# Patient Record
Sex: FEMALE | Race: WHITE | NOT HISPANIC OR LATINO | URBAN - METROPOLITAN AREA
[De-identification: names, ages, dates, MRNs, and addresses within clinical notes are randomized per-mention and may not be internally consistent; named-entity substitution may affect disease eponyms.]

---

## 2019-12-05 ENCOUNTER — EMERGENCY (EMERGENCY)
Facility: HOSPITAL | Age: 84
LOS: 1 days | Discharge: HOME | End: 2019-12-05
Attending: EMERGENCY MEDICINE | Admitting: EMERGENCY MEDICINE
Payer: MEDICARE

## 2019-12-05 ENCOUNTER — TRANSCRIPTION ENCOUNTER (OUTPATIENT)
Age: 84
End: 2019-12-05

## 2019-12-05 VITALS
DIASTOLIC BLOOD PRESSURE: 93 MMHG | OXYGEN SATURATION: 99 % | SYSTOLIC BLOOD PRESSURE: 182 MMHG | HEART RATE: 86 BPM | RESPIRATION RATE: 18 BRPM | TEMPERATURE: 97 F

## 2019-12-05 DIAGNOSIS — Q74.2 OTHER CONGENITAL MALFORMATIONS OF LOWER LIMB(S), INCLUDING PELVIC GIRDLE: Chronic | ICD-10-CM

## 2019-12-05 LAB
ALBUMIN SERPL ELPH-MCNC: 3.7 G/DL — SIGNIFICANT CHANGE UP (ref 3.3–5)
ALP SERPL-CCNC: 105 U/L — SIGNIFICANT CHANGE UP (ref 40–120)
ALT FLD-CCNC: 13 U/L — SIGNIFICANT CHANGE UP (ref 4–33)
ANION GAP SERPL CALC-SCNC: 13 MMO/L — SIGNIFICANT CHANGE UP (ref 7–14)
APPEARANCE UR: CLEAR — SIGNIFICANT CHANGE UP
AST SERPL-CCNC: 17 U/L — SIGNIFICANT CHANGE UP (ref 4–32)
BASE EXCESS BLDV CALC-SCNC: 2.9 MMOL/L — SIGNIFICANT CHANGE UP
BILIRUB SERPL-MCNC: 0.5 MG/DL — SIGNIFICANT CHANGE UP (ref 0.2–1.2)
BILIRUB UR-MCNC: NEGATIVE — SIGNIFICANT CHANGE UP
BLOOD UR QL VISUAL: NEGATIVE — SIGNIFICANT CHANGE UP
BUN SERPL-MCNC: 22 MG/DL — SIGNIFICANT CHANGE UP (ref 7–23)
CALCIUM SERPL-MCNC: 9.5 MG/DL — SIGNIFICANT CHANGE UP (ref 8.4–10.5)
CHLORIDE SERPL-SCNC: 105 MMOL/L — SIGNIFICANT CHANGE UP (ref 98–107)
CO2 SERPL-SCNC: 24 MMOL/L — SIGNIFICANT CHANGE UP (ref 22–31)
COLOR SPEC: YELLOW — SIGNIFICANT CHANGE UP
CREAT SERPL-MCNC: 1.11 MG/DL — SIGNIFICANT CHANGE UP (ref 0.5–1.3)
GAS PNL BLDV: 139 MMOL/L — SIGNIFICANT CHANGE UP (ref 136–146)
GLUCOSE BLDV-MCNC: 117 MG/DL — HIGH (ref 70–99)
GLUCOSE SERPL-MCNC: 112 MG/DL — HIGH (ref 70–99)
GLUCOSE UR-MCNC: NEGATIVE — SIGNIFICANT CHANGE UP
HCO3 BLDV-SCNC: 25 MMOL/L — SIGNIFICANT CHANGE UP (ref 20–27)
HCT VFR BLD CALC: 35.1 % — SIGNIFICANT CHANGE UP (ref 34.5–45)
HCT VFR BLDV CALC: 40 % — SIGNIFICANT CHANGE UP (ref 34.5–45)
HGB BLD-MCNC: 11.5 G/DL — SIGNIFICANT CHANGE UP (ref 11.5–15.5)
HGB BLDV-MCNC: 13 G/DL — SIGNIFICANT CHANGE UP (ref 11.5–15.5)
KETONES UR-MCNC: NEGATIVE — SIGNIFICANT CHANGE UP
LEUKOCYTE ESTERASE UR-ACNC: NEGATIVE — SIGNIFICANT CHANGE UP
MCHC RBC-ENTMCNC: 30.3 PG — SIGNIFICANT CHANGE UP (ref 27–34)
MCHC RBC-ENTMCNC: 32.8 % — SIGNIFICANT CHANGE UP (ref 32–36)
MCV RBC AUTO: 92.6 FL — SIGNIFICANT CHANGE UP (ref 80–100)
NITRITE UR-MCNC: NEGATIVE — SIGNIFICANT CHANGE UP
NRBC # FLD: 0 K/UL — SIGNIFICANT CHANGE UP (ref 0–0)
PCO2 BLDV: 54 MMHG — HIGH (ref 41–51)
PH BLDV: 7.34 PH — SIGNIFICANT CHANGE UP (ref 7.32–7.43)
PH UR: 6 — SIGNIFICANT CHANGE UP (ref 5–8)
PLATELET # BLD AUTO: 171 K/UL — SIGNIFICANT CHANGE UP (ref 150–400)
PMV BLD: 9.3 FL — SIGNIFICANT CHANGE UP (ref 7–13)
PO2 BLDV: < 24 MMHG — LOW (ref 35–40)
POTASSIUM BLDV-SCNC: 4 MMOL/L — SIGNIFICANT CHANGE UP (ref 3.4–4.5)
POTASSIUM SERPL-MCNC: 4.1 MMOL/L — SIGNIFICANT CHANGE UP (ref 3.5–5.3)
POTASSIUM SERPL-SCNC: 4.1 MMOL/L — SIGNIFICANT CHANGE UP (ref 3.5–5.3)
PROT SERPL-MCNC: 6.8 G/DL — SIGNIFICANT CHANGE UP (ref 6–8.3)
PROT UR-MCNC: 10 — SIGNIFICANT CHANGE UP
RBC # BLD: 3.79 M/UL — LOW (ref 3.8–5.2)
RBC # FLD: 13.4 % — SIGNIFICANT CHANGE UP (ref 10.3–14.5)
SAO2 % BLDV: 21.8 % — LOW (ref 60–85)
SODIUM SERPL-SCNC: 142 MMOL/L — SIGNIFICANT CHANGE UP (ref 135–145)
SP GR SPEC: 1.02 — SIGNIFICANT CHANGE UP (ref 1–1.04)
UROBILINOGEN FLD QL: SIGNIFICANT CHANGE UP
WBC # BLD: 5.71 K/UL — SIGNIFICANT CHANGE UP (ref 3.8–10.5)
WBC # FLD AUTO: 5.71 K/UL — SIGNIFICANT CHANGE UP (ref 3.8–10.5)

## 2019-12-05 PROCEDURE — 99284 EMERGENCY DEPT VISIT MOD MDM: CPT | Mod: GC

## 2019-12-05 PROCEDURE — 74176 CT ABD & PELVIS W/O CONTRAST: CPT | Mod: 26

## 2019-12-05 RX ORDER — LIDOCAINE 4 G/100G
1 CREAM TOPICAL ONCE
Refills: 0 | Status: COMPLETED | OUTPATIENT
Start: 2019-12-05 | End: 2019-12-05

## 2019-12-05 RX ORDER — ACETAMINOPHEN 500 MG
975 TABLET ORAL ONCE
Refills: 0 | Status: COMPLETED | OUTPATIENT
Start: 2019-12-05 | End: 2019-12-05

## 2019-12-05 RX ADMIN — Medication 975 MILLIGRAM(S): at 21:45

## 2019-12-05 NOTE — ED ADULT NURSE NOTE - INTERVENTIONS DEFINITIONS
Stretcher in lowest position, wheels locked, appropriate side rails in place/Monitor gait and stability/Non-slip footwear when patient is off stretcher/Physically safe environment: no spills, clutter or unnecessary equipment

## 2019-12-05 NOTE — ED ADULT TRIAGE NOTE - CHIEF COMPLAINT QUOTE
Patient has c/o right flank to lower quadrant pain that has gotten worse today. Pt states pain gets so bad she gets dizzy. Pt had upset stomach and back pain for 2 weeks ago and was prescribed Linzess with no relief. Pt's dtr also states that pt has been constipated. MD  feels that she might be a kidney stone.

## 2019-12-05 NOTE — ED ADULT NURSE NOTE - OBJECTIVE STATEMENT
pt received in rm 29 AAO x 3. pt reports right flank pain and decreased PO intake for the past 1-2 weeks. pt denies sob, chest pain, nausea, vomiting, dirrhea, fevers, chills, urinary symptoms. respirations even and unlabored. labs drawn and sent.

## 2019-12-05 NOTE — ED PROVIDER NOTE - PATIENT PORTAL LINK FT
You can access the FollowMyHealth Patient Portal offered by [facility] by registering at the following website: [email or web address] By joining [facility]’s FollowMyHealth portal, you will also be able to view your health information using other applications (apps) compatible with our system.

## 2019-12-05 NOTE — ED PROVIDER NOTE - NSFOLLOWUPINSTRUCTIONS_ED_ALL_ED_FT
1.  prescription from pharmacy.   2. Use lidoderm patches as needed, for no more than 12 hours at a time. Must allow 12 hours or more between uses.   3. Make appointment to see PCP within 1-2 days, or as soon as possible.   4. Return to Emergency Department if pain worsens, develop fevers, changes in bowel or urinary habits.     Back Injury Prevention  Back injuries can be very painful. They can also be difficult to heal. After having one back injury, you are more likely to have another one again. It is important to learn how to avoid injuring or re-injuring your back. The following tips can help you to prevent a back injury.  What actions can I take to prevent back injuries?  Nutrition changes     Talk with your health care provider about your overall diet, and especially about foods that strengthen your bones.  Ask your health care provider how much calcium and vitamin D you need each day. These nutrients help to prevent weakening of the bones (osteoporosis). Osteoporosis can cause broken (fractured) bones, which lead to back pain.Eat foods that are good sources of calcium. These include dairy products, green leafy vegetables, and products that have had calcium added to them (fortified).Eat foods that are good sources of vitamin D. These include milk and foods that are fortified with vitamin D.If needed, take supplements and vitamins as directed by your health care provider.Physical fitness    Physical fitness strengthens your bones and your muscles. It also increases your balance and strength.  Exercise for 30 minutes per day on most days of the week, or as directed by your health care provider. Make sure to:  Do aerobic exercises, such as walking, jogging, biking, or swimming.Do exercises that increase balance and strength, such as eva chi and yoga. These can decrease your risk of falling and injuring your back.Do stretching exercises to help with flexibility.Develop strong abdominal muscles. Your abdominal muscles provide a lot of the support that your back needs.Maintain a healthy weight. This helps to decrease your risk of a back injury.Good posture            Prevent back injuries by developing and maintaining a good posture. To do this successfully:  Sit up and stand up straight. Avoid leaning forward when you sit or hunching over when you stand.Choose chairs that have good low-back (lumbar) support.If you work at a desk, sit close to it so you do not need to lean over. Keep your chin tucked in. Keep your neck drawn back, and keep your elbows bent at a right angle.Sit high and close to the steering wheel when you drive. Add a lumbar support to your car seat, if needed.Avoid sitting or standing in one position for very long. Take breaks to get up, stretch, and walk around at least one time every hour. Take breaks every hour if you are driving for long periods of time.Sleep on your side with your knees slightly bent, or sleep on your back with a pillow under your knees.Lifting, twisting, and reaching    Back injuries are more likely to occur when carrying loads and twisting at the same time. When you bend and lift, or reach for items that are high up in shelves, use positions that put less stress on your back.  Heavy lifting  Avoid heavy lifting, especially the kind of heavy lifting that is repetitive. If you must do heavy lifting:  Stretch before lifting.Work slowly.Rest between lifts.Use a tool such as a cart or a coleen to move objects.Make several small trips instead of carrying one heavy load.Ask for help when you need it, especially when moving big or heavy objects.Follow these steps when lifting:   Stand with your feet shoulder-width apart.Get as close to the object as you can. Do not try to  a heavy object that is far from your body.Use handles or lifting straps if they are available.Bend at your knees. Squat down, but keep your heels off the floor.Keep your shoulders pulled back, your chin tucked in, and your back straight. Lift the object slowly while you tighten the muscles in your legs, abdomen, and buttocks. Keep the object as close to the center of your body as possible.Follow these steps when putting down a heavy load:  Stand with your feet shoulder-width apart.Lower the object slowly while you tighten the muscles in your legs, abdomen, and buttocks. Keep the object as close to the center of your body as possible. Keep your shoulders pulled back, your chin tucked in, and your back straight.Bend at your knees. Squat down, but keep your heels off the floor.Use handles or lifting straps if they are available.Twisting and reaching  Avoid lifting heavy objects above your waist.Do not twist at your waist while you are lifting or carrying a load. If you need to turn, move your feet.Do not bend over without bending at your knees.Avoid reaching over your head, across a table, or for an object on a high surface.Other changes    Avoid wet floors and icy ground. Keep sidewalks clear of ice to prevent falls.Do not sleep on a mattress that is too soft or too hard.Put heavier objects on shelves at waist level, and put lighter objects on lower or higher shelves.Find ways to decrease your stress, such as by exercising, getting a massage, or practicing relaxation techniques. Stress can build up in your muscles. Tense muscles are more vulnerable to injury.Talk with your health care provider if you feel anxious or depressed. These conditions can make back pain worse.Wear flat heel shoes with cushioned soles.Use both shoulder straps when carrying a backpack.Do not use any products that contain nicotine or tobacco, such as cigarettes and e-cigarettes. If you need help quitting, ask your health care provider.Summary  Back injuries can be very painful and difficult to heal.You can prevent injuring or re-injuring your back by making nutrition changes, working on being physically fit, developing a good posture, and lifting heavy objects in a safe way.Making other changes can also help to prevent back injuries. These include eating a healthy diet, exercising regularly and maintaining a healthy weight.This information is not intended to replace advice given to you by your health care provider. Make sure you discuss any questions you have with your health care provider.

## 2019-12-05 NOTE — ED PROVIDER NOTE - ATTENDING CONTRIBUTION TO CARE
agree with resident note    "89 yo female hx HTN and GERD presenting with 1-2 day hx right flank pain. Decreased PO intake. No nausea/vomiting. No diarrhea. No dysuria. No fevers/chills. Was seen in urgent care today, and told to come to ED to r/o kidney stones. Took advil at home with no relief. No hx abdominal surgery."  Denies fever, urinary symptoms.    PE: well appearing; VSS: CTAB/L; s1 s2 no m/r/g abd soft/NT/ND back: right flank pain; no rash ext: no edema    imp: flank pain will get CT to r/o colic/AAA; check UA and labs; and reassess

## 2019-12-05 NOTE — ED PROVIDER NOTE - CLINICAL SUMMARY MEDICAL DECISION MAKING FREE TEXT BOX
Carmelita PGY1: 89 yo female hx HTN and GERD presenting with 1-2 day hx right flank pain. Labs wnl. CT no acute findings. Pain controlled with tylenol and lidoderm patch. Clinically stable. Discharge home with return precautions. Will follow up with PCP 1-2 days.

## 2019-12-05 NOTE — ED PROVIDER NOTE - OBJECTIVE STATEMENT
89 yo female hx HTN and GERD presenting with 1-2 day hx right flank pain. Decreased PO intake. No nausea/vomiting. No diarrhea. No dysuria. No fevers/chills. Was seen in urgent care today, and told to come to ED to r/o kidney stones. Took advil at home with no relief. No hx abdominal surgery.

## 2019-12-06 VITALS
RESPIRATION RATE: 18 BRPM | OXYGEN SATURATION: 100 % | SYSTOLIC BLOOD PRESSURE: 174 MMHG | DIASTOLIC BLOOD PRESSURE: 76 MMHG | HEART RATE: 74 BPM | TEMPERATURE: 98 F

## 2019-12-06 RX ORDER — LIDOCAINE 4 G/100G
10 CREAM TOPICAL
Qty: 10 | Refills: 0
Start: 2019-12-06

## 2019-12-06 RX ADMIN — LIDOCAINE 1 PATCH: 4 CREAM TOPICAL at 01:35

## 2019-12-06 NOTE — ED POST DISCHARGE NOTE - REASON FOR FOLLOW-UP
Other Pharmacist at [facility] called, states pt needs prior authorization from insurance to receive lidocaine patch as prescribed. Pharmacist quoted the patient of the price of lidocaine patch and patient complained of high min and inquired about alternatives. Pharmacist was instructed to give pt Salonpas instead.

## 2021-01-01 ENCOUNTER — TRANSCRIPTION ENCOUNTER (OUTPATIENT)
Age: 86
End: 2021-01-01

## 2021-01-01 ENCOUNTER — INPATIENT (INPATIENT)
Facility: HOSPITAL | Age: 86
LOS: 0 days | End: 2021-08-21
Attending: HOSPITALIST | Admitting: HOSPITALIST
Payer: MEDICARE

## 2021-01-01 ENCOUNTER — RESULT REVIEW (OUTPATIENT)
Age: 86
End: 2021-01-01

## 2021-01-01 ENCOUNTER — INPATIENT (INPATIENT)
Facility: HOSPITAL | Age: 86
LOS: 31 days | Discharge: ANOTHER HOSPITAL | End: 2021-08-17
Attending: STUDENT IN AN ORGANIZED HEALTH CARE EDUCATION/TRAINING PROGRAM | Admitting: STUDENT IN AN ORGANIZED HEALTH CARE EDUCATION/TRAINING PROGRAM
Payer: MEDICARE

## 2021-01-01 VITALS
RESPIRATION RATE: 18 BRPM | OXYGEN SATURATION: 96 % | DIASTOLIC BLOOD PRESSURE: 60 MMHG | TEMPERATURE: 98 F | HEART RATE: 70 BPM | SYSTOLIC BLOOD PRESSURE: 127 MMHG

## 2021-01-01 VITALS
RESPIRATION RATE: 18 BRPM | OXYGEN SATURATION: 98 % | SYSTOLIC BLOOD PRESSURE: 143 MMHG | HEIGHT: 64 IN | DIASTOLIC BLOOD PRESSURE: 78 MMHG | TEMPERATURE: 98 F | HEART RATE: 84 BPM

## 2021-01-01 VITALS
WEIGHT: 153 LBS | HEART RATE: 64 BPM | RESPIRATION RATE: 24 BRPM | DIASTOLIC BLOOD PRESSURE: 57 MMHG | SYSTOLIC BLOOD PRESSURE: 116 MMHG | OXYGEN SATURATION: 98 %

## 2021-01-01 VITALS — HEIGHT: 64 IN

## 2021-01-01 DIAGNOSIS — Z71.89 OTHER SPECIFIED COUNSELING: ICD-10-CM

## 2021-01-01 DIAGNOSIS — I31.3 PERICARDIAL EFFUSION (NONINFLAMMATORY): ICD-10-CM

## 2021-01-01 DIAGNOSIS — Q74.2 OTHER CONGENITAL MALFORMATIONS OF LOWER LIMB(S), INCLUDING PELVIC GIRDLE: Chronic | ICD-10-CM

## 2021-01-01 DIAGNOSIS — G92 TOXIC ENCEPHALOPATHY: ICD-10-CM

## 2021-01-01 DIAGNOSIS — R09.89 OTHER SPECIFIED SYMPTOMS AND SIGNS INVOLVING THE CIRCULATORY AND RESPIRATORY SYSTEMS: ICD-10-CM

## 2021-01-01 DIAGNOSIS — J96.01 ACUTE RESPIRATORY FAILURE WITH HYPOXIA: ICD-10-CM

## 2021-01-01 DIAGNOSIS — R29.6 REPEATED FALLS: ICD-10-CM

## 2021-01-01 DIAGNOSIS — L03.90 CELLULITIS, UNSPECIFIED: ICD-10-CM

## 2021-01-01 DIAGNOSIS — R41.82 ALTERED MENTAL STATUS, UNSPECIFIED: ICD-10-CM

## 2021-01-01 DIAGNOSIS — S22.49XA MULTIPLE FRACTURES OF RIBS, UNSPECIFIED SIDE, INITIAL ENCOUNTER FOR CLOSED FRACTURE: ICD-10-CM

## 2021-01-01 DIAGNOSIS — J93.9 PNEUMOTHORAX, UNSPECIFIED: ICD-10-CM

## 2021-01-01 DIAGNOSIS — I10 ESSENTIAL (PRIMARY) HYPERTENSION: ICD-10-CM

## 2021-01-01 DIAGNOSIS — J96.21 ACUTE AND CHRONIC RESPIRATORY FAILURE WITH HYPOXIA: ICD-10-CM

## 2021-01-01 DIAGNOSIS — Z29.9 ENCOUNTER FOR PROPHYLACTIC MEASURES, UNSPECIFIED: ICD-10-CM

## 2021-01-01 DIAGNOSIS — Z79.899 OTHER LONG TERM (CURRENT) DRUG THERAPY: ICD-10-CM

## 2021-01-01 LAB
ALBUMIN SERPL ELPH-MCNC: 3.1 G/DL — LOW (ref 3.3–5)
ALBUMIN SERPL ELPH-MCNC: 3.1 G/DL — LOW (ref 3.3–5)
ALBUMIN SERPL ELPH-MCNC: 3.3 G/DL — SIGNIFICANT CHANGE UP (ref 3.3–5)
ALBUMIN SERPL ELPH-MCNC: 3.4 G/DL — SIGNIFICANT CHANGE UP (ref 3.3–5)
ALBUMIN SERPL ELPH-MCNC: 3.5 G/DL — SIGNIFICANT CHANGE UP (ref 3.3–5)
ALBUMIN SERPL ELPH-MCNC: 3.6 G/DL — SIGNIFICANT CHANGE UP (ref 3.3–5)
ALBUMIN SERPL ELPH-MCNC: 3.7 G/DL — SIGNIFICANT CHANGE UP (ref 3.3–5)
ALP SERPL-CCNC: 63 U/L — SIGNIFICANT CHANGE UP (ref 40–120)
ALP SERPL-CCNC: 64 U/L — SIGNIFICANT CHANGE UP (ref 40–120)
ALP SERPL-CCNC: 71 U/L — SIGNIFICANT CHANGE UP (ref 40–120)
ALP SERPL-CCNC: 78 U/L — SIGNIFICANT CHANGE UP (ref 40–120)
ALP SERPL-CCNC: 79 U/L — SIGNIFICANT CHANGE UP (ref 40–120)
ALP SERPL-CCNC: 91 U/L — SIGNIFICANT CHANGE UP (ref 40–120)
ALP SERPL-CCNC: 93 U/L — SIGNIFICANT CHANGE UP (ref 40–120)
ALT FLD-CCNC: 10 U/L — SIGNIFICANT CHANGE UP (ref 4–33)
ALT FLD-CCNC: 12 U/L — SIGNIFICANT CHANGE UP (ref 4–33)
ALT FLD-CCNC: 13 U/L — SIGNIFICANT CHANGE UP (ref 4–33)
ALT FLD-CCNC: 88 U/L — HIGH (ref 4–33)
ANION GAP SERPL CALC-SCNC: 10 MMOL/L — SIGNIFICANT CHANGE UP (ref 7–14)
ANION GAP SERPL CALC-SCNC: 11 MMOL/L — SIGNIFICANT CHANGE UP (ref 7–14)
ANION GAP SERPL CALC-SCNC: 12 MMOL/L — SIGNIFICANT CHANGE UP (ref 7–14)
ANION GAP SERPL CALC-SCNC: 14 MMOL/L — SIGNIFICANT CHANGE UP (ref 7–14)
ANION GAP SERPL CALC-SCNC: 15 MMOL/L — HIGH (ref 7–14)
ANION GAP SERPL CALC-SCNC: 7 MMOL/L — SIGNIFICANT CHANGE UP (ref 7–14)
ANION GAP SERPL CALC-SCNC: 8 MMOL/L — SIGNIFICANT CHANGE UP (ref 7–14)
ANION GAP SERPL CALC-SCNC: 9 MMOL/L — SIGNIFICANT CHANGE UP (ref 7–14)
APAP SERPL-MCNC: <15 UG/ML — SIGNIFICANT CHANGE UP (ref 15–25)
APPEARANCE UR: ABNORMAL
APPEARANCE UR: CLEAR — SIGNIFICANT CHANGE UP
APTT BLD: 160.7 SEC — CRITICAL HIGH (ref 27–36.3)
APTT BLD: 30.6 SEC — SIGNIFICANT CHANGE UP (ref 27–36.3)
APTT BLD: 39.5 SEC — HIGH (ref 27–36.3)
AST SERPL-CCNC: 13 U/L — SIGNIFICANT CHANGE UP (ref 4–32)
AST SERPL-CCNC: 14 U/L — SIGNIFICANT CHANGE UP (ref 4–32)
AST SERPL-CCNC: 15 U/L — SIGNIFICANT CHANGE UP (ref 4–32)
AST SERPL-CCNC: 16 U/L — SIGNIFICANT CHANGE UP (ref 4–32)
AST SERPL-CCNC: 17 U/L — SIGNIFICANT CHANGE UP (ref 4–32)
AST SERPL-CCNC: 17 U/L — SIGNIFICANT CHANGE UP (ref 4–32)
AST SERPL-CCNC: 99 U/L — HIGH (ref 4–32)
BACTERIA # UR AUTO: ABNORMAL
BASE EXCESS BLDA CALC-SCNC: 3.6 MMOL/L — HIGH (ref -2–2)
BASE EXCESS BLDV CALC-SCNC: 2.5 MMOL/L — HIGH (ref -3–2)
BASE EXCESS BLDV CALC-SCNC: 3.4 MMOL/L — HIGH (ref -3–2)
BASE EXCESS BLDV CALC-SCNC: 4 MMOL/L — HIGH (ref -3–2)
BASE EXCESS BLDV CALC-SCNC: 4.1 MMOL/L — HIGH (ref -3–2)
BASE EXCESS BLDV CALC-SCNC: 4.2 MMOL/L — HIGH (ref -3–2)
BASE EXCESS BLDV CALC-SCNC: 4.8 MMOL/L — HIGH (ref -3–2)
BASE EXCESS BLDV CALC-SCNC: 9.3 MMOL/L — HIGH (ref -2–3)
BASE EXCESS BLDV CALC-SCNC: SIGNIFICANT CHANGE UP MMOL/L (ref -2–3)
BASOPHILS # BLD AUTO: 0.01 K/UL — SIGNIFICANT CHANGE UP (ref 0–0.2)
BASOPHILS # BLD AUTO: 0.02 K/UL — SIGNIFICANT CHANGE UP (ref 0–0.2)
BASOPHILS NFR BLD AUTO: 0.2 % — SIGNIFICANT CHANGE UP (ref 0–2)
BASOPHILS NFR BLD AUTO: 0.3 % — SIGNIFICANT CHANGE UP (ref 0–2)
BASOPHILS NFR BLD AUTO: 0.4 % — SIGNIFICANT CHANGE UP (ref 0–2)
BILIRUB SERPL-MCNC: 0.3 MG/DL — SIGNIFICANT CHANGE UP (ref 0.2–1.2)
BILIRUB SERPL-MCNC: 0.3 MG/DL — SIGNIFICANT CHANGE UP (ref 0.2–1.2)
BILIRUB SERPL-MCNC: 0.4 MG/DL — SIGNIFICANT CHANGE UP (ref 0.2–1.2)
BILIRUB SERPL-MCNC: 0.5 MG/DL — SIGNIFICANT CHANGE UP (ref 0.2–1.2)
BILIRUB UR-MCNC: NEGATIVE — SIGNIFICANT CHANGE UP
BILIRUB UR-MCNC: NEGATIVE — SIGNIFICANT CHANGE UP
BLD GP AB SCN SERPL QL: NEGATIVE — SIGNIFICANT CHANGE UP
BLOOD GAS VENOUS - CREATININE: 0.9 MG/DL — SIGNIFICANT CHANGE UP (ref 0.5–1.3)
BLOOD GAS VENOUS - CREATININE: 1 MG/DL — SIGNIFICANT CHANGE UP (ref 0.5–1.3)
BLOOD GAS VENOUS - CREATININE: 1.1 MG/DL — SIGNIFICANT CHANGE UP (ref 0.5–1.3)
BLOOD GAS VENOUS - CREATININE: 1.2 MG/DL — SIGNIFICANT CHANGE UP (ref 0.5–1.3)
BLOOD GAS VENOUS - CREATININE: 1.4 MG/DL — HIGH (ref 0.5–1.3)
BLOOD GAS VENOUS COMPREHENSIVE RESULT: SIGNIFICANT CHANGE UP
BUN SERPL-MCNC: 13 MG/DL — SIGNIFICANT CHANGE UP (ref 7–23)
BUN SERPL-MCNC: 13 MG/DL — SIGNIFICANT CHANGE UP (ref 7–23)
BUN SERPL-MCNC: 15 MG/DL — SIGNIFICANT CHANGE UP (ref 7–23)
BUN SERPL-MCNC: 16 MG/DL — SIGNIFICANT CHANGE UP (ref 7–23)
BUN SERPL-MCNC: 17 MG/DL — SIGNIFICANT CHANGE UP (ref 7–23)
BUN SERPL-MCNC: 18 MG/DL — SIGNIFICANT CHANGE UP (ref 7–23)
BUN SERPL-MCNC: 19 MG/DL — SIGNIFICANT CHANGE UP (ref 7–23)
BUN SERPL-MCNC: 19 MG/DL — SIGNIFICANT CHANGE UP (ref 7–23)
BUN SERPL-MCNC: 20 MG/DL — SIGNIFICANT CHANGE UP (ref 7–23)
BUN SERPL-MCNC: 20 MG/DL — SIGNIFICANT CHANGE UP (ref 7–23)
BUN SERPL-MCNC: 21 MG/DL — SIGNIFICANT CHANGE UP (ref 7–23)
BUN SERPL-MCNC: 21 MG/DL — SIGNIFICANT CHANGE UP (ref 7–23)
BUN SERPL-MCNC: 22 MG/DL — SIGNIFICANT CHANGE UP (ref 7–23)
BUN SERPL-MCNC: 23 MG/DL — SIGNIFICANT CHANGE UP (ref 7–23)
BUN SERPL-MCNC: 24 MG/DL — HIGH (ref 7–23)
BUN SERPL-MCNC: 24 MG/DL — HIGH (ref 7–23)
BUN SERPL-MCNC: 26 MG/DL — HIGH (ref 7–23)
BUN SERPL-MCNC: 26 MG/DL — HIGH (ref 7–23)
BUN SERPL-MCNC: 27 MG/DL — HIGH (ref 7–23)
BUN SERPL-MCNC: 28 MG/DL — HIGH (ref 7–23)
BUN SERPL-MCNC: 31 MG/DL — HIGH (ref 7–23)
BUN SERPL-MCNC: 34 MG/DL — HIGH (ref 7–23)
BUN SERPL-MCNC: 36 MG/DL — HIGH (ref 7–23)
BUN SERPL-MCNC: 37 MG/DL — HIGH (ref 7–23)
BUN SERPL-MCNC: 38 MG/DL — HIGH (ref 7–23)
CALCIUM SERPL-MCNC: 8.1 MG/DL — LOW (ref 8.4–10.5)
CALCIUM SERPL-MCNC: 8.5 MG/DL — SIGNIFICANT CHANGE UP (ref 8.4–10.5)
CALCIUM SERPL-MCNC: 8.6 MG/DL — SIGNIFICANT CHANGE UP (ref 8.4–10.5)
CALCIUM SERPL-MCNC: 8.6 MG/DL — SIGNIFICANT CHANGE UP (ref 8.4–10.5)
CALCIUM SERPL-MCNC: 8.7 MG/DL — SIGNIFICANT CHANGE UP (ref 8.4–10.5)
CALCIUM SERPL-MCNC: 8.8 MG/DL — SIGNIFICANT CHANGE UP (ref 8.4–10.5)
CALCIUM SERPL-MCNC: 8.9 MG/DL — SIGNIFICANT CHANGE UP (ref 8.4–10.5)
CALCIUM SERPL-MCNC: 9 MG/DL — SIGNIFICANT CHANGE UP (ref 8.4–10.5)
CALCIUM SERPL-MCNC: 9.1 MG/DL — SIGNIFICANT CHANGE UP (ref 8.4–10.5)
CALCIUM SERPL-MCNC: 9.4 MG/DL — SIGNIFICANT CHANGE UP (ref 8.4–10.5)
CHLORIDE BLDV-SCNC: 102 MMOL/L — SIGNIFICANT CHANGE UP (ref 96–108)
CHLORIDE BLDV-SCNC: 103 MMOL/L — SIGNIFICANT CHANGE UP (ref 96–108)
CHLORIDE BLDV-SCNC: 104 MMOL/L — SIGNIFICANT CHANGE UP (ref 96–108)
CHLORIDE BLDV-SCNC: 105 MMOL/L — SIGNIFICANT CHANGE UP (ref 96–108)
CHLORIDE BLDV-SCNC: 106 MMOL/L — SIGNIFICANT CHANGE UP (ref 96–108)
CHLORIDE BLDV-SCNC: 95 MMOL/L — LOW (ref 96–108)
CHLORIDE BLDV-SCNC: 96 MMOL/L — SIGNIFICANT CHANGE UP (ref 96–108)
CHLORIDE SERPL-SCNC: 101 MMOL/L — SIGNIFICANT CHANGE UP (ref 98–107)
CHLORIDE SERPL-SCNC: 102 MMOL/L — SIGNIFICANT CHANGE UP (ref 98–107)
CHLORIDE SERPL-SCNC: 103 MMOL/L — SIGNIFICANT CHANGE UP (ref 98–107)
CHLORIDE SERPL-SCNC: 103 MMOL/L — SIGNIFICANT CHANGE UP (ref 98–107)
CHLORIDE SERPL-SCNC: 104 MMOL/L — SIGNIFICANT CHANGE UP (ref 98–107)
CHLORIDE SERPL-SCNC: 104 MMOL/L — SIGNIFICANT CHANGE UP (ref 98–107)
CHLORIDE SERPL-SCNC: 91 MMOL/L — LOW (ref 98–107)
CHLORIDE SERPL-SCNC: 92 MMOL/L — LOW (ref 98–107)
CHLORIDE SERPL-SCNC: 93 MMOL/L — LOW (ref 98–107)
CHLORIDE SERPL-SCNC: 94 MMOL/L — LOW (ref 98–107)
CHLORIDE SERPL-SCNC: 95 MMOL/L — LOW (ref 98–107)
CHLORIDE SERPL-SCNC: 95 MMOL/L — LOW (ref 98–107)
CHLORIDE SERPL-SCNC: 96 MMOL/L — LOW (ref 98–107)
CHLORIDE SERPL-SCNC: 96 MMOL/L — LOW (ref 98–107)
CHLORIDE SERPL-SCNC: 97 MMOL/L — LOW (ref 98–107)
CHLORIDE SERPL-SCNC: 98 MMOL/L — SIGNIFICANT CHANGE UP (ref 98–107)
CHLORIDE SERPL-SCNC: 99 MMOL/L — SIGNIFICANT CHANGE UP (ref 98–107)
CK SERPL-CCNC: 40 U/L — SIGNIFICANT CHANGE UP (ref 25–170)
CO2 BLDV-SCNC: 38.7 MMOL/L — HIGH (ref 22–26)
CO2 BLDV-SCNC: SIGNIFICANT CHANGE UP MMOL/L (ref 22–26)
CO2 SERPL-SCNC: 22 MMOL/L — SIGNIFICANT CHANGE UP (ref 22–31)
CO2 SERPL-SCNC: 22 MMOL/L — SIGNIFICANT CHANGE UP (ref 22–31)
CO2 SERPL-SCNC: 23 MMOL/L — SIGNIFICANT CHANGE UP (ref 22–31)
CO2 SERPL-SCNC: 24 MMOL/L — SIGNIFICANT CHANGE UP (ref 22–31)
CO2 SERPL-SCNC: 25 MMOL/L — SIGNIFICANT CHANGE UP (ref 22–31)
CO2 SERPL-SCNC: 25 MMOL/L — SIGNIFICANT CHANGE UP (ref 22–31)
CO2 SERPL-SCNC: 26 MMOL/L — SIGNIFICANT CHANGE UP (ref 22–31)
CO2 SERPL-SCNC: 27 MMOL/L — SIGNIFICANT CHANGE UP (ref 22–31)
CO2 SERPL-SCNC: 28 MMOL/L — SIGNIFICANT CHANGE UP (ref 22–31)
CO2 SERPL-SCNC: 28 MMOL/L — SIGNIFICANT CHANGE UP (ref 22–31)
CO2 SERPL-SCNC: 29 MMOL/L — SIGNIFICANT CHANGE UP (ref 22–31)
CO2 SERPL-SCNC: 30 MMOL/L — SIGNIFICANT CHANGE UP (ref 22–31)
CO2 SERPL-SCNC: 31 MMOL/L — SIGNIFICANT CHANGE UP (ref 22–31)
CO2 SERPL-SCNC: 32 MMOL/L — HIGH (ref 22–31)
CO2 SERPL-SCNC: 36 MMOL/L — HIGH (ref 22–31)
COD CRY URNS QL: SIGNIFICANT CHANGE UP
COLOR SPEC: YELLOW — SIGNIFICANT CHANGE UP
COLOR SPEC: YELLOW — SIGNIFICANT CHANGE UP
COVID-19 SPIKE DOMAIN AB INTERP: POSITIVE
COVID-19 SPIKE DOMAIN ANTIBODY RESULT: >250 U/ML — HIGH
CREAT SERPL-MCNC: 0.61 MG/DL — SIGNIFICANT CHANGE UP (ref 0.5–1.3)
CREAT SERPL-MCNC: 0.69 MG/DL — SIGNIFICANT CHANGE UP (ref 0.5–1.3)
CREAT SERPL-MCNC: 0.7 MG/DL — SIGNIFICANT CHANGE UP (ref 0.5–1.3)
CREAT SERPL-MCNC: 0.72 MG/DL — SIGNIFICANT CHANGE UP (ref 0.5–1.3)
CREAT SERPL-MCNC: 0.73 MG/DL — SIGNIFICANT CHANGE UP (ref 0.5–1.3)
CREAT SERPL-MCNC: 0.74 MG/DL — SIGNIFICANT CHANGE UP (ref 0.5–1.3)
CREAT SERPL-MCNC: 0.75 MG/DL — SIGNIFICANT CHANGE UP (ref 0.5–1.3)
CREAT SERPL-MCNC: 0.78 MG/DL — SIGNIFICANT CHANGE UP (ref 0.5–1.3)
CREAT SERPL-MCNC: 0.79 MG/DL — SIGNIFICANT CHANGE UP (ref 0.5–1.3)
CREAT SERPL-MCNC: 0.8 MG/DL — SIGNIFICANT CHANGE UP (ref 0.5–1.3)
CREAT SERPL-MCNC: 0.81 MG/DL — SIGNIFICANT CHANGE UP (ref 0.5–1.3)
CREAT SERPL-MCNC: 0.82 MG/DL — SIGNIFICANT CHANGE UP (ref 0.5–1.3)
CREAT SERPL-MCNC: 0.83 MG/DL — SIGNIFICANT CHANGE UP (ref 0.5–1.3)
CREAT SERPL-MCNC: 0.83 MG/DL — SIGNIFICANT CHANGE UP (ref 0.5–1.3)
CREAT SERPL-MCNC: 0.84 MG/DL — SIGNIFICANT CHANGE UP (ref 0.5–1.3)
CREAT SERPL-MCNC: 0.84 MG/DL — SIGNIFICANT CHANGE UP (ref 0.5–1.3)
CREAT SERPL-MCNC: 0.89 MG/DL — SIGNIFICANT CHANGE UP (ref 0.5–1.3)
CREAT SERPL-MCNC: 0.89 MG/DL — SIGNIFICANT CHANGE UP (ref 0.5–1.3)
CREAT SERPL-MCNC: 0.9 MG/DL — SIGNIFICANT CHANGE UP (ref 0.5–1.3)
CREAT SERPL-MCNC: 0.9 MG/DL — SIGNIFICANT CHANGE UP (ref 0.5–1.3)
CREAT SERPL-MCNC: 0.93 MG/DL — SIGNIFICANT CHANGE UP (ref 0.5–1.3)
CREAT SERPL-MCNC: 0.96 MG/DL — SIGNIFICANT CHANGE UP (ref 0.5–1.3)
CREAT SERPL-MCNC: 1 MG/DL — SIGNIFICANT CHANGE UP (ref 0.5–1.3)
CREAT SERPL-MCNC: 1.01 MG/DL — SIGNIFICANT CHANGE UP (ref 0.5–1.3)
CREAT SERPL-MCNC: 1.05 MG/DL — SIGNIFICANT CHANGE UP (ref 0.5–1.3)
CREAT SERPL-MCNC: 1.08 MG/DL — SIGNIFICANT CHANGE UP (ref 0.5–1.3)
CREAT SERPL-MCNC: 1.09 MG/DL — SIGNIFICANT CHANGE UP (ref 0.5–1.3)
CREAT SERPL-MCNC: 1.11 MG/DL — SIGNIFICANT CHANGE UP (ref 0.5–1.3)
CREAT SERPL-MCNC: 1.29 MG/DL — SIGNIFICANT CHANGE UP (ref 0.5–1.3)
CULTURE RESULTS: NO GROWTH — SIGNIFICANT CHANGE UP
CULTURE RESULTS: SIGNIFICANT CHANGE UP
D DIMER BLD IA.RAPID-MCNC: 2231 NG/ML DDU — HIGH
DIFF PNL FLD: ABNORMAL
DIFF PNL FLD: ABNORMAL
EOSINOPHIL # BLD AUTO: 0 K/UL — SIGNIFICANT CHANGE UP (ref 0–0.5)
EOSINOPHIL # BLD AUTO: 0.11 K/UL — SIGNIFICANT CHANGE UP (ref 0–0.5)
EOSINOPHIL # BLD AUTO: 0.16 K/UL — SIGNIFICANT CHANGE UP (ref 0–0.5)
EOSINOPHIL # BLD AUTO: 0.16 K/UL — SIGNIFICANT CHANGE UP (ref 0–0.5)
EOSINOPHIL # BLD AUTO: 0.28 K/UL — SIGNIFICANT CHANGE UP (ref 0–0.5)
EOSINOPHIL # BLD AUTO: 0.3 K/UL — SIGNIFICANT CHANGE UP (ref 0–0.5)
EOSINOPHIL # BLD AUTO: 0.31 K/UL — SIGNIFICANT CHANGE UP (ref 0–0.5)
EOSINOPHIL # BLD AUTO: 0.33 K/UL — SIGNIFICANT CHANGE UP (ref 0–0.5)
EOSINOPHIL # BLD AUTO: 0.36 K/UL — SIGNIFICANT CHANGE UP (ref 0–0.5)
EOSINOPHIL # BLD AUTO: 0.36 K/UL — SIGNIFICANT CHANGE UP (ref 0–0.5)
EOSINOPHIL # BLD AUTO: 0.37 K/UL — SIGNIFICANT CHANGE UP (ref 0–0.5)
EOSINOPHIL # BLD AUTO: 0.37 K/UL — SIGNIFICANT CHANGE UP (ref 0–0.5)
EOSINOPHIL NFR BLD AUTO: 0 % — SIGNIFICANT CHANGE UP (ref 0–6)
EOSINOPHIL NFR BLD AUTO: 2.3 % — SIGNIFICANT CHANGE UP (ref 0–6)
EOSINOPHIL NFR BLD AUTO: 3.1 % — SIGNIFICANT CHANGE UP (ref 0–6)
EOSINOPHIL NFR BLD AUTO: 4.8 % — SIGNIFICANT CHANGE UP (ref 0–6)
EOSINOPHIL NFR BLD AUTO: 4.9 % — SIGNIFICANT CHANGE UP (ref 0–6)
EOSINOPHIL NFR BLD AUTO: 5.1 % — SIGNIFICANT CHANGE UP (ref 0–6)
EOSINOPHIL NFR BLD AUTO: 5.7 % — SIGNIFICANT CHANGE UP (ref 0–6)
EOSINOPHIL NFR BLD AUTO: 6.9 % — HIGH (ref 0–6)
EOSINOPHIL NFR BLD AUTO: 7 % — HIGH (ref 0–6)
EOSINOPHIL NFR BLD AUTO: 7.2 % — HIGH (ref 0–6)
EOSINOPHIL NFR BLD AUTO: 7.2 % — HIGH (ref 0–6)
EOSINOPHIL NFR BLD AUTO: 7.9 % — HIGH (ref 0–6)
EPI CELLS # UR: 1 /HPF — SIGNIFICANT CHANGE UP (ref 0–5)
ETHANOL SERPL-MCNC: <10 MG/DL — SIGNIFICANT CHANGE UP
FOLATE SERPL-MCNC: 20 NG/ML — HIGH (ref 3.1–17.5)
GAS PNL BLDV: 130 MMOL/L — LOW (ref 136–145)
GAS PNL BLDV: 133 MMOL/L — LOW (ref 136–145)
GAS PNL BLDV: 137 MMOL/L — SIGNIFICANT CHANGE UP (ref 136–146)
GAS PNL BLDV: 137 MMOL/L — SIGNIFICANT CHANGE UP (ref 136–146)
GAS PNL BLDV: 139 MMOL/L — SIGNIFICANT CHANGE UP (ref 136–146)
GAS PNL BLDV: 140 MMOL/L — SIGNIFICANT CHANGE UP (ref 136–146)
GAS PNL BLDV: 141 MMOL/L — SIGNIFICANT CHANGE UP (ref 136–146)
GAS PNL BLDV: SIGNIFICANT CHANGE UP
GLUCOSE BLDV-MCNC: 103 MG/DL — HIGH (ref 70–99)
GLUCOSE BLDV-MCNC: 105 MG/DL — HIGH (ref 70–99)
GLUCOSE BLDV-MCNC: 110 MG/DL — HIGH (ref 70–99)
GLUCOSE BLDV-MCNC: 114 MG/DL — HIGH (ref 70–99)
GLUCOSE BLDV-MCNC: 116 MG/DL — HIGH (ref 70–99)
GLUCOSE BLDV-MCNC: 129 MG/DL — HIGH (ref 70–99)
GLUCOSE BLDV-MCNC: 192 MG/DL — HIGH (ref 70–99)
GLUCOSE SERPL-MCNC: 100 MG/DL — HIGH (ref 70–99)
GLUCOSE SERPL-MCNC: 103 MG/DL — HIGH (ref 70–99)
GLUCOSE SERPL-MCNC: 103 MG/DL — HIGH (ref 70–99)
GLUCOSE SERPL-MCNC: 104 MG/DL — HIGH (ref 70–99)
GLUCOSE SERPL-MCNC: 106 MG/DL — HIGH (ref 70–99)
GLUCOSE SERPL-MCNC: 107 MG/DL — HIGH (ref 70–99)
GLUCOSE SERPL-MCNC: 108 MG/DL — HIGH (ref 70–99)
GLUCOSE SERPL-MCNC: 109 MG/DL — HIGH (ref 70–99)
GLUCOSE SERPL-MCNC: 109 MG/DL — HIGH (ref 70–99)
GLUCOSE SERPL-MCNC: 111 MG/DL — HIGH (ref 70–99)
GLUCOSE SERPL-MCNC: 112 MG/DL — HIGH (ref 70–99)
GLUCOSE SERPL-MCNC: 113 MG/DL — HIGH (ref 70–99)
GLUCOSE SERPL-MCNC: 114 MG/DL — HIGH (ref 70–99)
GLUCOSE SERPL-MCNC: 116 MG/DL — HIGH (ref 70–99)
GLUCOSE SERPL-MCNC: 117 MG/DL — HIGH (ref 70–99)
GLUCOSE SERPL-MCNC: 119 MG/DL — HIGH (ref 70–99)
GLUCOSE SERPL-MCNC: 120 MG/DL — HIGH (ref 70–99)
GLUCOSE SERPL-MCNC: 123 MG/DL — HIGH (ref 70–99)
GLUCOSE SERPL-MCNC: 129 MG/DL — HIGH (ref 70–99)
GLUCOSE SERPL-MCNC: 132 MG/DL — HIGH (ref 70–99)
GLUCOSE SERPL-MCNC: 213 MG/DL — HIGH (ref 70–99)
GLUCOSE SERPL-MCNC: 85 MG/DL — SIGNIFICANT CHANGE UP (ref 70–99)
GLUCOSE SERPL-MCNC: 87 MG/DL — SIGNIFICANT CHANGE UP (ref 70–99)
GLUCOSE SERPL-MCNC: 92 MG/DL — SIGNIFICANT CHANGE UP (ref 70–99)
GLUCOSE SERPL-MCNC: 93 MG/DL — SIGNIFICANT CHANGE UP (ref 70–99)
GLUCOSE SERPL-MCNC: 94 MG/DL — SIGNIFICANT CHANGE UP (ref 70–99)
GLUCOSE SERPL-MCNC: 95 MG/DL — SIGNIFICANT CHANGE UP (ref 70–99)
GLUCOSE SERPL-MCNC: 99 MG/DL — SIGNIFICANT CHANGE UP (ref 70–99)
GLUCOSE SERPL-MCNC: 99 MG/DL — SIGNIFICANT CHANGE UP (ref 70–99)
GLUCOSE UR QL: NEGATIVE — SIGNIFICANT CHANGE UP
GLUCOSE UR QL: NEGATIVE — SIGNIFICANT CHANGE UP
HCO3 BLDA-SCNC: 26 MMOL/L — SIGNIFICANT CHANGE UP (ref 22–26)
HCO3 BLDV-SCNC: 25 MMOL/L — SIGNIFICANT CHANGE UP (ref 20–27)
HCO3 BLDV-SCNC: 26 MMOL/L — SIGNIFICANT CHANGE UP (ref 20–27)
HCO3 BLDV-SCNC: 27 MMOL/L — SIGNIFICANT CHANGE UP (ref 20–27)
HCO3 BLDV-SCNC: 37 MMOL/L — HIGH (ref 22–29)
HCO3 BLDV-SCNC: SIGNIFICANT CHANGE UP MMOL/L (ref 22–29)
HCT VFR BLD CALC: 24.6 % — LOW (ref 34.5–45)
HCT VFR BLD CALC: 31.3 % — LOW (ref 34.5–45)
HCT VFR BLD CALC: 31.5 % — LOW (ref 34.5–45)
HCT VFR BLD CALC: 31.8 % — LOW (ref 34.5–45)
HCT VFR BLD CALC: 32.4 % — LOW (ref 34.5–45)
HCT VFR BLD CALC: 32.4 % — LOW (ref 34.5–45)
HCT VFR BLD CALC: 32.5 % — LOW (ref 34.5–45)
HCT VFR BLD CALC: 32.7 % — LOW (ref 34.5–45)
HCT VFR BLD CALC: 32.8 % — LOW (ref 34.5–45)
HCT VFR BLD CALC: 33 % — LOW (ref 34.5–45)
HCT VFR BLD CALC: 33.2 % — LOW (ref 34.5–45)
HCT VFR BLD CALC: 33.4 % — LOW (ref 34.5–45)
HCT VFR BLD CALC: 33.5 % — LOW (ref 34.5–45)
HCT VFR BLD CALC: 33.7 % — LOW (ref 34.5–45)
HCT VFR BLD CALC: 33.8 % — LOW (ref 34.5–45)
HCT VFR BLD CALC: 33.8 % — LOW (ref 34.5–45)
HCT VFR BLD CALC: 34 % — LOW (ref 34.5–45)
HCT VFR BLD CALC: 34.1 % — LOW (ref 34.5–45)
HCT VFR BLD CALC: 34.3 % — LOW (ref 34.5–45)
HCT VFR BLD CALC: 34.5 % — SIGNIFICANT CHANGE UP (ref 34.5–45)
HCT VFR BLD CALC: 34.6 % — SIGNIFICANT CHANGE UP (ref 34.5–45)
HCT VFR BLD CALC: 34.9 % — SIGNIFICANT CHANGE UP (ref 34.5–45)
HCT VFR BLD CALC: 35.4 % — SIGNIFICANT CHANGE UP (ref 34.5–45)
HCT VFR BLD CALC: 35.9 % — SIGNIFICANT CHANGE UP (ref 34.5–45)
HCT VFR BLD CALC: 36.1 % — SIGNIFICANT CHANGE UP (ref 34.5–45)
HCT VFR BLD CALC: 36.5 % — SIGNIFICANT CHANGE UP (ref 34.5–45)
HCT VFR BLD CALC: 36.9 % — SIGNIFICANT CHANGE UP (ref 34.5–45)
HCT VFR BLD CALC: 37 % — SIGNIFICANT CHANGE UP (ref 34.5–45)
HCT VFR BLD CALC: 37 % — SIGNIFICANT CHANGE UP (ref 34.5–45)
HCT VFR BLD CALC: 38.4 % — SIGNIFICANT CHANGE UP (ref 34.5–45)
HCT VFR BLDA CALC: 33 % — LOW (ref 34.5–46.5)
HCT VFR BLDA CALC: 35.2 % — SIGNIFICANT CHANGE UP (ref 34.5–46.5)
HCT VFR BLDA CALC: 35.9 % — SIGNIFICANT CHANGE UP (ref 34.5–46.5)
HCT VFR BLDA CALC: 36.8 % — SIGNIFICANT CHANGE UP (ref 34.5–46.5)
HCT VFR BLDA CALC: 37.2 % — SIGNIFICANT CHANGE UP (ref 34.5–46.5)
HCT VFR BLDA CALC: 37.9 % — SIGNIFICANT CHANGE UP (ref 34.5–46.5)
HCT VFR BLDA CALC: 38 % — SIGNIFICANT CHANGE UP (ref 34.5–46.5)
HGB BLD CALC-MCNC: 11.1 G/DL — LOW (ref 11.5–15.5)
HGB BLD CALC-MCNC: 11.4 G/DL — LOW (ref 11.5–15.5)
HGB BLD CALC-MCNC: 11.7 G/DL — SIGNIFICANT CHANGE UP (ref 11.5–15.5)
HGB BLD CALC-MCNC: 11.9 G/DL — SIGNIFICANT CHANGE UP (ref 11.5–15.5)
HGB BLD CALC-MCNC: 12.1 G/DL — SIGNIFICANT CHANGE UP (ref 11.5–15.5)
HGB BLD CALC-MCNC: 12.3 G/DL — SIGNIFICANT CHANGE UP (ref 11.5–15.5)
HGB BLD CALC-MCNC: 12.5 G/DL — SIGNIFICANT CHANGE UP (ref 11.5–15.5)
HGB BLD-MCNC: 10 G/DL — LOW (ref 11.5–15.5)
HGB BLD-MCNC: 10.1 G/DL — LOW (ref 11.5–15.5)
HGB BLD-MCNC: 10.3 G/DL — LOW (ref 11.5–15.5)
HGB BLD-MCNC: 10.4 G/DL — LOW (ref 11.5–15.5)
HGB BLD-MCNC: 10.5 G/DL — LOW (ref 11.5–15.5)
HGB BLD-MCNC: 10.6 G/DL — LOW (ref 11.5–15.5)
HGB BLD-MCNC: 10.7 G/DL — LOW (ref 11.5–15.5)
HGB BLD-MCNC: 10.8 G/DL — LOW (ref 11.5–15.5)
HGB BLD-MCNC: 10.8 G/DL — LOW (ref 11.5–15.5)
HGB BLD-MCNC: 10.9 G/DL — LOW (ref 11.5–15.5)
HGB BLD-MCNC: 11 G/DL — LOW (ref 11.5–15.5)
HGB BLD-MCNC: 11 G/DL — LOW (ref 11.5–15.5)
HGB BLD-MCNC: 11.1 G/DL — LOW (ref 11.5–15.5)
HGB BLD-MCNC: 11.1 G/DL — LOW (ref 11.5–15.5)
HGB BLD-MCNC: 11.2 G/DL — LOW (ref 11.5–15.5)
HGB BLD-MCNC: 11.3 G/DL — LOW (ref 11.5–15.5)
HGB BLD-MCNC: 11.4 G/DL — LOW (ref 11.5–15.5)
HGB BLD-MCNC: 11.6 G/DL — SIGNIFICANT CHANGE UP (ref 11.5–15.5)
HGB BLD-MCNC: 11.7 G/DL — SIGNIFICANT CHANGE UP (ref 11.5–15.5)
HGB BLD-MCNC: 11.7 G/DL — SIGNIFICANT CHANGE UP (ref 11.5–15.5)
HGB BLD-MCNC: 12 G/DL — SIGNIFICANT CHANGE UP (ref 11.5–15.5)
HGB BLD-MCNC: 7.3 G/DL — LOW (ref 11.5–15.5)
HYALINE CASTS # UR AUTO: 1 /LPF — SIGNIFICANT CHANGE UP (ref 0–7)
IANC: 2.38 K/UL — SIGNIFICANT CHANGE UP (ref 1.5–8.5)
IANC: 2.71 K/UL — SIGNIFICANT CHANGE UP (ref 1.5–8.5)
IANC: 2.72 K/UL — SIGNIFICANT CHANGE UP (ref 1.5–8.5)
IANC: 3.24 K/UL — SIGNIFICANT CHANGE UP (ref 1.5–8.5)
IANC: 3.33 K/UL — SIGNIFICANT CHANGE UP (ref 1.5–8.5)
IANC: 3.39 K/UL — SIGNIFICANT CHANGE UP (ref 1.5–8.5)
IANC: 3.57 K/UL — SIGNIFICANT CHANGE UP (ref 1.5–8.5)
IANC: 3.74 K/UL — SIGNIFICANT CHANGE UP (ref 1.5–8.5)
IANC: 3.87 K/UL — SIGNIFICANT CHANGE UP (ref 1.5–8.5)
IANC: 3.89 K/UL — SIGNIFICANT CHANGE UP (ref 1.5–8.5)
IANC: 4.23 K/UL — SIGNIFICANT CHANGE UP (ref 1.5–8.5)
IANC: 5.99 K/UL — SIGNIFICANT CHANGE UP (ref 1.5–8.5)
IMM GRANULOCYTES NFR BLD AUTO: 0.2 % — SIGNIFICANT CHANGE UP (ref 0–1.5)
IMM GRANULOCYTES NFR BLD AUTO: 0.2 % — SIGNIFICANT CHANGE UP (ref 0–1.5)
IMM GRANULOCYTES NFR BLD AUTO: 0.3 % — SIGNIFICANT CHANGE UP (ref 0–1.5)
IMM GRANULOCYTES NFR BLD AUTO: 0.3 % — SIGNIFICANT CHANGE UP (ref 0–1.5)
IMM GRANULOCYTES NFR BLD AUTO: 0.4 % — SIGNIFICANT CHANGE UP (ref 0–1.5)
IMM GRANULOCYTES NFR BLD AUTO: 0.5 % — SIGNIFICANT CHANGE UP (ref 0–1.5)
IMM GRANULOCYTES NFR BLD AUTO: 1.1 % — SIGNIFICANT CHANGE UP (ref 0–1.5)
INR BLD: 1.07 RATIO — SIGNIFICANT CHANGE UP (ref 0.88–1.16)
INR BLD: 1.12 RATIO — SIGNIFICANT CHANGE UP (ref 0.88–1.16)
KETONES UR-MCNC: NEGATIVE — SIGNIFICANT CHANGE UP
KETONES UR-MCNC: NEGATIVE — SIGNIFICANT CHANGE UP
LACTATE BLDV-MCNC: 0.6 MMOL/L — SIGNIFICANT CHANGE UP (ref 0.5–2)
LACTATE BLDV-MCNC: 0.7 MMOL/L — SIGNIFICANT CHANGE UP (ref 0.5–2)
LACTATE BLDV-MCNC: 1 MMOL/L — SIGNIFICANT CHANGE UP (ref 0.5–2)
LACTATE BLDV-MCNC: 1.5 MMOL/L — SIGNIFICANT CHANGE UP (ref 0.5–2)
LACTATE BLDV-MCNC: 1.7 MMOL/L — SIGNIFICANT CHANGE UP (ref 0.5–2)
LACTATE BLDV-MCNC: 1.7 MMOL/L — SIGNIFICANT CHANGE UP (ref 0.5–2)
LACTATE BLDV-MCNC: 2.1 MMOL/L — HIGH (ref 0.5–2)
LEUKOCYTE ESTERASE UR-ACNC: ABNORMAL
LEUKOCYTE ESTERASE UR-ACNC: NEGATIVE — SIGNIFICANT CHANGE UP
LYMPHOCYTES # BLD AUTO: 0.51 K/UL — LOW (ref 1–3.3)
LYMPHOCYTES # BLD AUTO: 0.58 K/UL — LOW (ref 1–3.3)
LYMPHOCYTES # BLD AUTO: 0.62 K/UL — LOW (ref 1–3.3)
LYMPHOCYTES # BLD AUTO: 0.84 K/UL — LOW (ref 1–3.3)
LYMPHOCYTES # BLD AUTO: 0.89 K/UL — LOW (ref 1–3.3)
LYMPHOCYTES # BLD AUTO: 0.9 K/UL — LOW (ref 1–3.3)
LYMPHOCYTES # BLD AUTO: 0.95 K/UL — LOW (ref 1–3.3)
LYMPHOCYTES # BLD AUTO: 0.98 K/UL — LOW (ref 1–3.3)
LYMPHOCYTES # BLD AUTO: 0.98 K/UL — LOW (ref 1–3.3)
LYMPHOCYTES # BLD AUTO: 1.03 K/UL — SIGNIFICANT CHANGE UP (ref 1–3.3)
LYMPHOCYTES # BLD AUTO: 1.03 K/UL — SIGNIFICANT CHANGE UP (ref 1–3.3)
LYMPHOCYTES # BLD AUTO: 1.07 K/UL — SIGNIFICANT CHANGE UP (ref 1–3.3)
LYMPHOCYTES # BLD AUTO: 12.8 % — LOW (ref 13–44)
LYMPHOCYTES # BLD AUTO: 15.4 % — SIGNIFICANT CHANGE UP (ref 13–44)
LYMPHOCYTES # BLD AUTO: 15.5 % — SIGNIFICANT CHANGE UP (ref 13–44)
LYMPHOCYTES # BLD AUTO: 16.4 % — SIGNIFICANT CHANGE UP (ref 13–44)
LYMPHOCYTES # BLD AUTO: 17.2 % — SIGNIFICANT CHANGE UP (ref 13–44)
LYMPHOCYTES # BLD AUTO: 17.4 % — SIGNIFICANT CHANGE UP (ref 13–44)
LYMPHOCYTES # BLD AUTO: 17.8 % — SIGNIFICANT CHANGE UP (ref 13–44)
LYMPHOCYTES # BLD AUTO: 18.9 % — SIGNIFICANT CHANGE UP (ref 13–44)
LYMPHOCYTES # BLD AUTO: 20.7 % — SIGNIFICANT CHANGE UP (ref 13–44)
LYMPHOCYTES # BLD AUTO: 21.3 % — SIGNIFICANT CHANGE UP (ref 13–44)
LYMPHOCYTES # BLD AUTO: 21.6 % — SIGNIFICANT CHANGE UP (ref 13–44)
LYMPHOCYTES # BLD AUTO: 8.3 % — LOW (ref 13–44)
MAGNESIUM SERPL-MCNC: 1.8 MG/DL — SIGNIFICANT CHANGE UP (ref 1.6–2.6)
MAGNESIUM SERPL-MCNC: 1.9 MG/DL — SIGNIFICANT CHANGE UP (ref 1.6–2.6)
MAGNESIUM SERPL-MCNC: 2 MG/DL — SIGNIFICANT CHANGE UP (ref 1.6–2.6)
MAGNESIUM SERPL-MCNC: 2 MG/DL — SIGNIFICANT CHANGE UP (ref 1.6–2.6)
MAGNESIUM SERPL-MCNC: 2.1 MG/DL — SIGNIFICANT CHANGE UP (ref 1.6–2.6)
MAGNESIUM SERPL-MCNC: 2.2 MG/DL — SIGNIFICANT CHANGE UP (ref 1.6–2.6)
MAGNESIUM SERPL-MCNC: 2.3 MG/DL — SIGNIFICANT CHANGE UP (ref 1.6–2.6)
MAGNESIUM SERPL-MCNC: 2.4 MG/DL — SIGNIFICANT CHANGE UP (ref 1.6–2.6)
MANUAL SMEAR VERIFICATION: SIGNIFICANT CHANGE UP
MCHC RBC-ENTMCNC: 29.7 GM/DL — LOW (ref 32–36)
MCHC RBC-ENTMCNC: 29.7 PG — SIGNIFICANT CHANGE UP (ref 27–34)
MCHC RBC-ENTMCNC: 29.8 PG — SIGNIFICANT CHANGE UP (ref 27–34)
MCHC RBC-ENTMCNC: 29.9 PG — SIGNIFICANT CHANGE UP (ref 27–34)
MCHC RBC-ENTMCNC: 30 PG — SIGNIFICANT CHANGE UP (ref 27–34)
MCHC RBC-ENTMCNC: 30 PG — SIGNIFICANT CHANGE UP (ref 27–34)
MCHC RBC-ENTMCNC: 30.1 PG — SIGNIFICANT CHANGE UP (ref 27–34)
MCHC RBC-ENTMCNC: 30.1 PG — SIGNIFICANT CHANGE UP (ref 27–34)
MCHC RBC-ENTMCNC: 30.2 PG — SIGNIFICANT CHANGE UP (ref 27–34)
MCHC RBC-ENTMCNC: 30.3 PG — SIGNIFICANT CHANGE UP (ref 27–34)
MCHC RBC-ENTMCNC: 30.4 PG — SIGNIFICANT CHANGE UP (ref 27–34)
MCHC RBC-ENTMCNC: 30.5 PG — SIGNIFICANT CHANGE UP (ref 27–34)
MCHC RBC-ENTMCNC: 30.5 PG — SIGNIFICANT CHANGE UP (ref 27–34)
MCHC RBC-ENTMCNC: 30.6 PG — SIGNIFICANT CHANGE UP (ref 27–34)
MCHC RBC-ENTMCNC: 30.7 PG — SIGNIFICANT CHANGE UP (ref 27–34)
MCHC RBC-ENTMCNC: 30.7 PG — SIGNIFICANT CHANGE UP (ref 27–34)
MCHC RBC-ENTMCNC: 30.8 PG — SIGNIFICANT CHANGE UP (ref 27–34)
MCHC RBC-ENTMCNC: 30.9 GM/DL — LOW (ref 32–36)
MCHC RBC-ENTMCNC: 30.9 PG — SIGNIFICANT CHANGE UP (ref 27–34)
MCHC RBC-ENTMCNC: 30.9 PG — SIGNIFICANT CHANGE UP (ref 27–34)
MCHC RBC-ENTMCNC: 31 GM/DL — LOW (ref 32–36)
MCHC RBC-ENTMCNC: 31.1 GM/DL — LOW (ref 32–36)
MCHC RBC-ENTMCNC: 31.2 GM/DL — LOW (ref 32–36)
MCHC RBC-ENTMCNC: 31.3 GM/DL — LOW (ref 32–36)
MCHC RBC-ENTMCNC: 31.4 GM/DL — LOW (ref 32–36)
MCHC RBC-ENTMCNC: 31.4 GM/DL — LOW (ref 32–36)
MCHC RBC-ENTMCNC: 31.5 GM/DL — LOW (ref 32–36)
MCHC RBC-ENTMCNC: 31.6 GM/DL — LOW (ref 32–36)
MCHC RBC-ENTMCNC: 31.6 GM/DL — LOW (ref 32–36)
MCHC RBC-ENTMCNC: 31.8 GM/DL — LOW (ref 32–36)
MCHC RBC-ENTMCNC: 31.9 GM/DL — LOW (ref 32–36)
MCHC RBC-ENTMCNC: 32 GM/DL — SIGNIFICANT CHANGE UP (ref 32–36)
MCHC RBC-ENTMCNC: 32 GM/DL — SIGNIFICANT CHANGE UP (ref 32–36)
MCHC RBC-ENTMCNC: 32.1 GM/DL — SIGNIFICANT CHANGE UP (ref 32–36)
MCHC RBC-ENTMCNC: 32.2 GM/DL — SIGNIFICANT CHANGE UP (ref 32–36)
MCHC RBC-ENTMCNC: 32.2 GM/DL — SIGNIFICANT CHANGE UP (ref 32–36)
MCHC RBC-ENTMCNC: 32.3 GM/DL — SIGNIFICANT CHANGE UP (ref 32–36)
MCHC RBC-ENTMCNC: 32.4 GM/DL — SIGNIFICANT CHANGE UP (ref 32–36)
MCHC RBC-ENTMCNC: 32.4 GM/DL — SIGNIFICANT CHANGE UP (ref 32–36)
MCHC RBC-ENTMCNC: 32.5 GM/DL — SIGNIFICANT CHANGE UP (ref 32–36)
MCHC RBC-ENTMCNC: 32.5 GM/DL — SIGNIFICANT CHANGE UP (ref 32–36)
MCHC RBC-ENTMCNC: 32.7 GM/DL — SIGNIFICANT CHANGE UP (ref 32–36)
MCV RBC AUTO: 104.2 FL — HIGH (ref 80–100)
MCV RBC AUTO: 91.7 FL — SIGNIFICANT CHANGE UP (ref 80–100)
MCV RBC AUTO: 92.2 FL — SIGNIFICANT CHANGE UP (ref 80–100)
MCV RBC AUTO: 93 FL — SIGNIFICANT CHANGE UP (ref 80–100)
MCV RBC AUTO: 93.2 FL — SIGNIFICANT CHANGE UP (ref 80–100)
MCV RBC AUTO: 93.3 FL — SIGNIFICANT CHANGE UP (ref 80–100)
MCV RBC AUTO: 93.6 FL — SIGNIFICANT CHANGE UP (ref 80–100)
MCV RBC AUTO: 93.6 FL — SIGNIFICANT CHANGE UP (ref 80–100)
MCV RBC AUTO: 93.8 FL — SIGNIFICANT CHANGE UP (ref 80–100)
MCV RBC AUTO: 94.2 FL — SIGNIFICANT CHANGE UP (ref 80–100)
MCV RBC AUTO: 94.3 FL — SIGNIFICANT CHANGE UP (ref 80–100)
MCV RBC AUTO: 94.5 FL — SIGNIFICANT CHANGE UP (ref 80–100)
MCV RBC AUTO: 94.5 FL — SIGNIFICANT CHANGE UP (ref 80–100)
MCV RBC AUTO: 94.6 FL — SIGNIFICANT CHANGE UP (ref 80–100)
MCV RBC AUTO: 94.6 FL — SIGNIFICANT CHANGE UP (ref 80–100)
MCV RBC AUTO: 94.7 FL — SIGNIFICANT CHANGE UP (ref 80–100)
MCV RBC AUTO: 94.7 FL — SIGNIFICANT CHANGE UP (ref 80–100)
MCV RBC AUTO: 94.8 FL — SIGNIFICANT CHANGE UP (ref 80–100)
MCV RBC AUTO: 94.8 FL — SIGNIFICANT CHANGE UP (ref 80–100)
MCV RBC AUTO: 94.9 FL — SIGNIFICANT CHANGE UP (ref 80–100)
MCV RBC AUTO: 95 FL — SIGNIFICANT CHANGE UP (ref 80–100)
MCV RBC AUTO: 95.1 FL — SIGNIFICANT CHANGE UP (ref 80–100)
MCV RBC AUTO: 95.1 FL — SIGNIFICANT CHANGE UP (ref 80–100)
MCV RBC AUTO: 95.2 FL — SIGNIFICANT CHANGE UP (ref 80–100)
MCV RBC AUTO: 95.4 FL — SIGNIFICANT CHANGE UP (ref 80–100)
MCV RBC AUTO: 95.6 FL — SIGNIFICANT CHANGE UP (ref 80–100)
MCV RBC AUTO: 95.8 FL — SIGNIFICANT CHANGE UP (ref 80–100)
MCV RBC AUTO: 96 FL — SIGNIFICANT CHANGE UP (ref 80–100)
MCV RBC AUTO: 96.2 FL — SIGNIFICANT CHANGE UP (ref 80–100)
MCV RBC AUTO: 96.5 FL — SIGNIFICANT CHANGE UP (ref 80–100)
MCV RBC AUTO: 96.5 FL — SIGNIFICANT CHANGE UP (ref 80–100)
MCV RBC AUTO: 96.8 FL — SIGNIFICANT CHANGE UP (ref 80–100)
MCV RBC AUTO: 97.2 FL — SIGNIFICANT CHANGE UP (ref 80–100)
MCV RBC AUTO: 97.3 FL — SIGNIFICANT CHANGE UP (ref 80–100)
MCV RBC AUTO: 97.9 FL — SIGNIFICANT CHANGE UP (ref 80–100)
MONOCYTES # BLD AUTO: 0.22 K/UL — SIGNIFICANT CHANGE UP (ref 0–0.9)
MONOCYTES # BLD AUTO: 0.29 K/UL — SIGNIFICANT CHANGE UP (ref 0–0.9)
MONOCYTES # BLD AUTO: 0.36 K/UL — SIGNIFICANT CHANGE UP (ref 0–0.9)
MONOCYTES # BLD AUTO: 0.43 K/UL — SIGNIFICANT CHANGE UP (ref 0–0.9)
MONOCYTES # BLD AUTO: 0.44 K/UL — SIGNIFICANT CHANGE UP (ref 0–0.9)
MONOCYTES # BLD AUTO: 0.45 K/UL — SIGNIFICANT CHANGE UP (ref 0–0.9)
MONOCYTES # BLD AUTO: 0.46 K/UL — SIGNIFICANT CHANGE UP (ref 0–0.9)
MONOCYTES # BLD AUTO: 0.46 K/UL — SIGNIFICANT CHANGE UP (ref 0–0.9)
MONOCYTES # BLD AUTO: 0.47 K/UL — SIGNIFICANT CHANGE UP (ref 0–0.9)
MONOCYTES # BLD AUTO: 0.52 K/UL — SIGNIFICANT CHANGE UP (ref 0–0.9)
MONOCYTES # BLD AUTO: 0.55 K/UL — SIGNIFICANT CHANGE UP (ref 0–0.9)
MONOCYTES # BLD AUTO: 0.66 K/UL — SIGNIFICANT CHANGE UP (ref 0–0.9)
MONOCYTES NFR BLD AUTO: 10.1 % — SIGNIFICANT CHANGE UP (ref 2–14)
MONOCYTES NFR BLD AUTO: 10.1 % — SIGNIFICANT CHANGE UP (ref 2–14)
MONOCYTES NFR BLD AUTO: 10.5 % — SIGNIFICANT CHANGE UP (ref 2–14)
MONOCYTES NFR BLD AUTO: 10.6 % — SIGNIFICANT CHANGE UP (ref 2–14)
MONOCYTES NFR BLD AUTO: 4.2 % — SIGNIFICANT CHANGE UP (ref 2–14)
MONOCYTES NFR BLD AUTO: 6.7 % — SIGNIFICANT CHANGE UP (ref 2–14)
MONOCYTES NFR BLD AUTO: 7.4 % — SIGNIFICANT CHANGE UP (ref 2–14)
MONOCYTES NFR BLD AUTO: 7.9 % — SIGNIFICANT CHANGE UP (ref 2–14)
MONOCYTES NFR BLD AUTO: 8.5 % — SIGNIFICANT CHANGE UP (ref 2–14)
MONOCYTES NFR BLD AUTO: 8.9 % — SIGNIFICANT CHANGE UP (ref 2–14)
MONOCYTES NFR BLD AUTO: 9 % — SIGNIFICANT CHANGE UP (ref 2–14)
MONOCYTES NFR BLD AUTO: 9.1 % — SIGNIFICANT CHANGE UP (ref 2–14)
NEUTROPHILS # BLD AUTO: 2.38 K/UL — SIGNIFICANT CHANGE UP (ref 1.8–7.4)
NEUTROPHILS # BLD AUTO: 2.71 K/UL — SIGNIFICANT CHANGE UP (ref 1.8–7.4)
NEUTROPHILS # BLD AUTO: 2.72 K/UL — SIGNIFICANT CHANGE UP (ref 1.8–7.4)
NEUTROPHILS # BLD AUTO: 3.24 K/UL — SIGNIFICANT CHANGE UP (ref 1.8–7.4)
NEUTROPHILS # BLD AUTO: 3.33 K/UL — SIGNIFICANT CHANGE UP (ref 1.8–7.4)
NEUTROPHILS # BLD AUTO: 3.39 K/UL — SIGNIFICANT CHANGE UP (ref 1.8–7.4)
NEUTROPHILS # BLD AUTO: 3.57 K/UL — SIGNIFICANT CHANGE UP (ref 1.8–7.4)
NEUTROPHILS # BLD AUTO: 3.74 K/UL — SIGNIFICANT CHANGE UP (ref 1.8–7.4)
NEUTROPHILS # BLD AUTO: 3.87 K/UL — SIGNIFICANT CHANGE UP (ref 1.8–7.4)
NEUTROPHILS # BLD AUTO: 3.89 K/UL — SIGNIFICANT CHANGE UP (ref 1.8–7.4)
NEUTROPHILS # BLD AUTO: 4.23 K/UL — SIGNIFICANT CHANGE UP (ref 1.8–7.4)
NEUTROPHILS # BLD AUTO: 5.99 K/UL — SIGNIFICANT CHANGE UP (ref 1.8–7.4)
NEUTROPHILS NFR BLD AUTO: 59.8 % — SIGNIFICANT CHANGE UP (ref 43–77)
NEUTROPHILS NFR BLD AUTO: 60.9 % — SIGNIFICANT CHANGE UP (ref 43–77)
NEUTROPHILS NFR BLD AUTO: 62.8 % — SIGNIFICANT CHANGE UP (ref 43–77)
NEUTROPHILS NFR BLD AUTO: 64.4 % — SIGNIFICANT CHANGE UP (ref 43–77)
NEUTROPHILS NFR BLD AUTO: 65.7 % — SIGNIFICANT CHANGE UP (ref 43–77)
NEUTROPHILS NFR BLD AUTO: 67 % — SIGNIFICANT CHANGE UP (ref 43–77)
NEUTROPHILS NFR BLD AUTO: 67.5 % — SIGNIFICANT CHANGE UP (ref 43–77)
NEUTROPHILS NFR BLD AUTO: 68.9 % — SIGNIFICANT CHANGE UP (ref 43–77)
NEUTROPHILS NFR BLD AUTO: 71 % — SIGNIFICANT CHANGE UP (ref 43–77)
NEUTROPHILS NFR BLD AUTO: 72.3 % — SIGNIFICANT CHANGE UP (ref 43–77)
NEUTROPHILS NFR BLD AUTO: 76.9 % — SIGNIFICANT CHANGE UP (ref 43–77)
NEUTROPHILS NFR BLD AUTO: 86.1 % — HIGH (ref 43–77)
NITRITE UR-MCNC: NEGATIVE — SIGNIFICANT CHANGE UP
NITRITE UR-MCNC: NEGATIVE — SIGNIFICANT CHANGE UP
NRBC # BLD: 0 /100 WBCS — SIGNIFICANT CHANGE UP
NRBC # FLD: 0 K/UL — SIGNIFICANT CHANGE UP
NT-PROBNP SERPL-SCNC: 951 PG/ML — HIGH
OTHER CELLS CSF MANUAL: 13.7 ML/DL — LOW (ref 16–21.5)
OTHER CELLS CSF MANUAL: 13.8 ML/DL — LOW (ref 16–21.5)
PCO2 BLDA: 64 MMHG — HIGH (ref 32–48)
PCO2 BLDV: 50 MMHG — SIGNIFICANT CHANGE UP (ref 41–51)
PCO2 BLDV: 65 MMHG — HIGH (ref 39–42)
PCO2 BLDV: 67 MMHG — HIGH (ref 41–51)
PCO2 BLDV: 68 MMHG — HIGH (ref 41–51)
PCO2 BLDV: 68 MMHG — HIGH (ref 41–51)
PCO2 BLDV: 71 MMHG — HIGH (ref 41–51)
PCO2 BLDV: 81 MMHG — CRITICAL HIGH (ref 41–51)
PCO2 BLDV: >125 MMHG — HIGH (ref 39–42)
PCP SPEC-MCNC: SIGNIFICANT CHANGE UP
PH BLDA: 7.29 — LOW (ref 7.35–7.45)
PH BLDV: 7.03 — LOW (ref 7.32–7.43)
PH BLDV: 7.21 — LOW (ref 7.32–7.43)
PH BLDV: 7.24 — LOW (ref 7.32–7.43)
PH BLDV: 7.27 — LOW (ref 7.32–7.43)
PH BLDV: 7.28 — LOW (ref 7.32–7.43)
PH BLDV: 7.28 — LOW (ref 7.32–7.43)
PH BLDV: 7.36 — SIGNIFICANT CHANGE UP (ref 7.32–7.43)
PH BLDV: 7.37 — SIGNIFICANT CHANGE UP (ref 7.32–7.43)
PH UR: 6.5 — SIGNIFICANT CHANGE UP (ref 5–8)
PH UR: 6.5 — SIGNIFICANT CHANGE UP (ref 5–8)
PHOSPHATE SERPL-MCNC: 2.9 MG/DL — SIGNIFICANT CHANGE UP (ref 2.5–4.5)
PHOSPHATE SERPL-MCNC: 3.1 MG/DL — SIGNIFICANT CHANGE UP (ref 2.5–4.5)
PHOSPHATE SERPL-MCNC: 3.2 MG/DL — SIGNIFICANT CHANGE UP (ref 2.5–4.5)
PHOSPHATE SERPL-MCNC: 3.3 MG/DL — SIGNIFICANT CHANGE UP (ref 2.5–4.5)
PHOSPHATE SERPL-MCNC: 3.3 MG/DL — SIGNIFICANT CHANGE UP (ref 2.5–4.5)
PHOSPHATE SERPL-MCNC: 3.4 MG/DL — SIGNIFICANT CHANGE UP (ref 2.5–4.5)
PHOSPHATE SERPL-MCNC: 3.5 MG/DL — SIGNIFICANT CHANGE UP (ref 2.5–4.5)
PHOSPHATE SERPL-MCNC: 3.6 MG/DL — SIGNIFICANT CHANGE UP (ref 2.5–4.5)
PHOSPHATE SERPL-MCNC: 3.7 MG/DL — SIGNIFICANT CHANGE UP (ref 2.5–4.5)
PHOSPHATE SERPL-MCNC: 3.7 MG/DL — SIGNIFICANT CHANGE UP (ref 2.5–4.5)
PHOSPHATE SERPL-MCNC: 3.8 MG/DL — SIGNIFICANT CHANGE UP (ref 2.5–4.5)
PHOSPHATE SERPL-MCNC: 4 MG/DL — SIGNIFICANT CHANGE UP (ref 2.5–4.5)
PHOSPHATE SERPL-MCNC: 4.2 MG/DL — SIGNIFICANT CHANGE UP (ref 2.5–4.5)
PHOSPHATE SERPL-MCNC: 4.5 MG/DL — SIGNIFICANT CHANGE UP (ref 2.5–4.5)
PHOSPHATE SERPL-MCNC: 4.8 MG/DL — HIGH (ref 2.5–4.5)
PLAT MORPH BLD: ABNORMAL
PLATELET # BLD AUTO: 126 K/UL — LOW (ref 150–400)
PLATELET # BLD AUTO: 128 K/UL — LOW (ref 150–400)
PLATELET # BLD AUTO: 130 K/UL — LOW (ref 150–400)
PLATELET # BLD AUTO: 137 K/UL — LOW (ref 150–400)
PLATELET # BLD AUTO: 141 K/UL — LOW (ref 150–400)
PLATELET # BLD AUTO: 146 K/UL — LOW (ref 150–400)
PLATELET # BLD AUTO: 146 K/UL — LOW (ref 150–400)
PLATELET # BLD AUTO: 149 K/UL — LOW (ref 150–400)
PLATELET # BLD AUTO: 149 K/UL — LOW (ref 150–400)
PLATELET # BLD AUTO: 150 K/UL — SIGNIFICANT CHANGE UP (ref 150–400)
PLATELET # BLD AUTO: 153 K/UL — SIGNIFICANT CHANGE UP (ref 150–400)
PLATELET # BLD AUTO: 154 K/UL — SIGNIFICANT CHANGE UP (ref 150–400)
PLATELET # BLD AUTO: 155 K/UL — SIGNIFICANT CHANGE UP (ref 150–400)
PLATELET # BLD AUTO: 156 K/UL — SIGNIFICANT CHANGE UP (ref 150–400)
PLATELET # BLD AUTO: 161 K/UL — SIGNIFICANT CHANGE UP (ref 150–400)
PLATELET # BLD AUTO: 161 K/UL — SIGNIFICANT CHANGE UP (ref 150–400)
PLATELET # BLD AUTO: 164 K/UL — SIGNIFICANT CHANGE UP (ref 150–400)
PLATELET # BLD AUTO: 165 K/UL — SIGNIFICANT CHANGE UP (ref 150–400)
PLATELET # BLD AUTO: 166 K/UL — SIGNIFICANT CHANGE UP (ref 150–400)
PLATELET # BLD AUTO: 168 K/UL — SIGNIFICANT CHANGE UP (ref 150–400)
PLATELET # BLD AUTO: 169 K/UL — SIGNIFICANT CHANGE UP (ref 150–400)
PLATELET # BLD AUTO: 170 K/UL — SIGNIFICANT CHANGE UP (ref 150–400)
PLATELET # BLD AUTO: 172 K/UL — SIGNIFICANT CHANGE UP (ref 150–400)
PLATELET # BLD AUTO: 177 K/UL — SIGNIFICANT CHANGE UP (ref 150–400)
PLATELET # BLD AUTO: 179 K/UL — SIGNIFICANT CHANGE UP (ref 150–400)
PLATELET # BLD AUTO: 183 K/UL — SIGNIFICANT CHANGE UP (ref 150–400)
PLATELET # BLD AUTO: 184 K/UL — SIGNIFICANT CHANGE UP (ref 150–400)
PLATELET # BLD AUTO: 188 K/UL — SIGNIFICANT CHANGE UP (ref 150–400)
PLATELET # BLD AUTO: 190 K/UL — SIGNIFICANT CHANGE UP (ref 150–400)
PLATELET # BLD AUTO: 191 K/UL — SIGNIFICANT CHANGE UP (ref 150–400)
PLATELET # BLD AUTO: 191 K/UL — SIGNIFICANT CHANGE UP (ref 150–400)
PLATELET # BLD AUTO: 198 K/UL — SIGNIFICANT CHANGE UP (ref 150–400)
PLATELET # BLD AUTO: 75 K/UL — LOW (ref 150–400)
PLATELET CLUMP BLD QL SMEAR: ABNORMAL
PLATELET COUNT - ESTIMATE: ABNORMAL
PO2 BLDA: 137 MMHG — HIGH (ref 83–108)
PO2 BLDV: 36 MMHG — SIGNIFICANT CHANGE UP (ref 35–40)
PO2 BLDV: 36 MMHG — SIGNIFICANT CHANGE UP (ref 35–40)
PO2 BLDV: 44 MMHG — HIGH (ref 35–40)
PO2 BLDV: 53 MMHG — HIGH (ref 35–40)
PO2 BLDV: 54 MMHG — SIGNIFICANT CHANGE UP
PO2 BLDV: 55 MMHG — HIGH (ref 35–40)
PO2 BLDV: 56 MMHG — HIGH (ref 35–40)
PO2 BLDV: 62 MMHG — SIGNIFICANT CHANGE UP
POTASSIUM BLDV-SCNC: 3.7 MMOL/L — SIGNIFICANT CHANGE UP (ref 3.4–4.5)
POTASSIUM BLDV-SCNC: 3.7 MMOL/L — SIGNIFICANT CHANGE UP (ref 3.4–4.5)
POTASSIUM BLDV-SCNC: 3.8 MMOL/L — SIGNIFICANT CHANGE UP (ref 3.4–4.5)
POTASSIUM BLDV-SCNC: 3.9 MMOL/L — SIGNIFICANT CHANGE UP (ref 3.4–4.5)
POTASSIUM BLDV-SCNC: 4.1 MMOL/L — SIGNIFICANT CHANGE UP (ref 3.4–4.5)
POTASSIUM BLDV-SCNC: 4.4 MMOL/L — SIGNIFICANT CHANGE UP (ref 3.5–5.1)
POTASSIUM BLDV-SCNC: SIGNIFICANT CHANGE UP MMOL/L (ref 3.5–5.1)
POTASSIUM SERPL-MCNC: 3.7 MMOL/L — SIGNIFICANT CHANGE UP (ref 3.5–5.3)
POTASSIUM SERPL-MCNC: 3.7 MMOL/L — SIGNIFICANT CHANGE UP (ref 3.5–5.3)
POTASSIUM SERPL-MCNC: 3.9 MMOL/L — SIGNIFICANT CHANGE UP (ref 3.5–5.3)
POTASSIUM SERPL-MCNC: 4.2 MMOL/L — SIGNIFICANT CHANGE UP (ref 3.5–5.3)
POTASSIUM SERPL-MCNC: 4.3 MMOL/L — SIGNIFICANT CHANGE UP (ref 3.5–5.3)
POTASSIUM SERPL-MCNC: 4.4 MMOL/L — SIGNIFICANT CHANGE UP (ref 3.5–5.3)
POTASSIUM SERPL-MCNC: 4.5 MMOL/L — SIGNIFICANT CHANGE UP (ref 3.5–5.3)
POTASSIUM SERPL-MCNC: 4.5 MMOL/L — SIGNIFICANT CHANGE UP (ref 3.5–5.3)
POTASSIUM SERPL-MCNC: 4.6 MMOL/L — SIGNIFICANT CHANGE UP (ref 3.5–5.3)
POTASSIUM SERPL-MCNC: 4.7 MMOL/L — SIGNIFICANT CHANGE UP (ref 3.5–5.3)
POTASSIUM SERPL-MCNC: 4.7 MMOL/L — SIGNIFICANT CHANGE UP (ref 3.5–5.3)
POTASSIUM SERPL-MCNC: 4.8 MMOL/L — SIGNIFICANT CHANGE UP (ref 3.5–5.3)
POTASSIUM SERPL-MCNC: 4.8 MMOL/L — SIGNIFICANT CHANGE UP (ref 3.5–5.3)
POTASSIUM SERPL-MCNC: 5.2 MMOL/L — SIGNIFICANT CHANGE UP (ref 3.5–5.3)
POTASSIUM SERPL-MCNC: 5.2 MMOL/L — SIGNIFICANT CHANGE UP (ref 3.5–5.3)
POTASSIUM SERPL-SCNC: 3.7 MMOL/L — SIGNIFICANT CHANGE UP (ref 3.5–5.3)
POTASSIUM SERPL-SCNC: 3.7 MMOL/L — SIGNIFICANT CHANGE UP (ref 3.5–5.3)
POTASSIUM SERPL-SCNC: 3.9 MMOL/L — SIGNIFICANT CHANGE UP (ref 3.5–5.3)
POTASSIUM SERPL-SCNC: 4.2 MMOL/L — SIGNIFICANT CHANGE UP (ref 3.5–5.3)
POTASSIUM SERPL-SCNC: 4.3 MMOL/L — SIGNIFICANT CHANGE UP (ref 3.5–5.3)
POTASSIUM SERPL-SCNC: 4.4 MMOL/L — SIGNIFICANT CHANGE UP (ref 3.5–5.3)
POTASSIUM SERPL-SCNC: 4.5 MMOL/L — SIGNIFICANT CHANGE UP (ref 3.5–5.3)
POTASSIUM SERPL-SCNC: 4.5 MMOL/L — SIGNIFICANT CHANGE UP (ref 3.5–5.3)
POTASSIUM SERPL-SCNC: 4.6 MMOL/L — SIGNIFICANT CHANGE UP (ref 3.5–5.3)
POTASSIUM SERPL-SCNC: 4.7 MMOL/L — SIGNIFICANT CHANGE UP (ref 3.5–5.3)
POTASSIUM SERPL-SCNC: 4.7 MMOL/L — SIGNIFICANT CHANGE UP (ref 3.5–5.3)
POTASSIUM SERPL-SCNC: 4.8 MMOL/L — SIGNIFICANT CHANGE UP (ref 3.5–5.3)
POTASSIUM SERPL-SCNC: 4.8 MMOL/L — SIGNIFICANT CHANGE UP (ref 3.5–5.3)
POTASSIUM SERPL-SCNC: 5.2 MMOL/L — SIGNIFICANT CHANGE UP (ref 3.5–5.3)
POTASSIUM SERPL-SCNC: 5.2 MMOL/L — SIGNIFICANT CHANGE UP (ref 3.5–5.3)
PROT SERPL-MCNC: 5.3 G/DL — LOW (ref 6–8.3)
PROT SERPL-MCNC: 5.7 G/DL — LOW (ref 6–8.3)
PROT SERPL-MCNC: 5.9 G/DL — LOW (ref 6–8.3)
PROT SERPL-MCNC: 6.2 G/DL — SIGNIFICANT CHANGE UP (ref 6–8.3)
PROT SERPL-MCNC: 6.4 G/DL — SIGNIFICANT CHANGE UP (ref 6–8.3)
PROT SERPL-MCNC: 6.6 G/DL — SIGNIFICANT CHANGE UP (ref 6–8.3)
PROT SERPL-MCNC: 6.8 G/DL — SIGNIFICANT CHANGE UP (ref 6–8.3)
PROT UR-MCNC: ABNORMAL
PROT UR-MCNC: ABNORMAL
PROTHROM AB SERPL-ACNC: 12.3 SEC — SIGNIFICANT CHANGE UP (ref 10.6–13.6)
PROTHROM AB SERPL-ACNC: 12.8 SEC — SIGNIFICANT CHANGE UP (ref 10.6–13.6)
RBC # BLD: 2.36 M/UL — LOW (ref 3.8–5.2)
RBC # BLD: 3.31 M/UL — LOW (ref 3.8–5.2)
RBC # BLD: 3.38 M/UL — LOW (ref 3.8–5.2)
RBC # BLD: 3.43 M/UL — LOW (ref 3.8–5.2)
RBC # BLD: 3.44 M/UL — LOW (ref 3.8–5.2)
RBC # BLD: 3.45 M/UL — LOW (ref 3.8–5.2)
RBC # BLD: 3.46 M/UL — LOW (ref 3.8–5.2)
RBC # BLD: 3.52 M/UL — LOW (ref 3.8–5.2)
RBC # BLD: 3.52 M/UL — LOW (ref 3.8–5.2)
RBC # BLD: 3.54 M/UL — LOW (ref 3.8–5.2)
RBC # BLD: 3.55 M/UL — LOW (ref 3.8–5.2)
RBC # BLD: 3.57 M/UL — LOW (ref 3.8–5.2)
RBC # BLD: 3.59 M/UL — LOW (ref 3.8–5.2)
RBC # BLD: 3.6 M/UL — LOW (ref 3.8–5.2)
RBC # BLD: 3.6 M/UL — LOW (ref 3.8–5.2)
RBC # BLD: 3.62 M/UL — LOW (ref 3.8–5.2)
RBC # BLD: 3.63 M/UL — LOW (ref 3.8–5.2)
RBC # BLD: 3.64 M/UL — LOW (ref 3.8–5.2)
RBC # BLD: 3.65 M/UL — LOW (ref 3.8–5.2)
RBC # BLD: 3.67 M/UL — LOW (ref 3.8–5.2)
RBC # BLD: 3.69 M/UL — LOW (ref 3.8–5.2)
RBC # BLD: 3.72 M/UL — LOW (ref 3.8–5.2)
RBC # BLD: 3.77 M/UL — LOW (ref 3.8–5.2)
RBC # BLD: 3.77 M/UL — LOW (ref 3.8–5.2)
RBC # BLD: 3.86 M/UL — SIGNIFICANT CHANGE UP (ref 3.8–5.2)
RBC # BLD: 3.88 M/UL — SIGNIFICANT CHANGE UP (ref 3.8–5.2)
RBC # BLD: 3.9 M/UL — SIGNIFICANT CHANGE UP (ref 3.8–5.2)
RBC # BLD: 3.95 M/UL — SIGNIFICANT CHANGE UP (ref 3.8–5.2)
RBC # FLD: 13.2 % — SIGNIFICANT CHANGE UP (ref 10.3–14.5)
RBC # FLD: 13.3 % — SIGNIFICANT CHANGE UP (ref 10.3–14.5)
RBC # FLD: 13.4 % — SIGNIFICANT CHANGE UP (ref 10.3–14.5)
RBC # FLD: 13.5 % — SIGNIFICANT CHANGE UP (ref 10.3–14.5)
RBC # FLD: 13.5 % — SIGNIFICANT CHANGE UP (ref 10.3–14.5)
RBC # FLD: 13.6 % — SIGNIFICANT CHANGE UP (ref 10.3–14.5)
RBC # FLD: 14.1 % — SIGNIFICANT CHANGE UP (ref 10.3–14.5)
RBC BLD AUTO: SIGNIFICANT CHANGE UP
RBC CASTS # UR COMP ASSIST: SIGNIFICANT CHANGE UP /HPF (ref 0–4)
RH IG SCN BLD-IMP: POSITIVE — SIGNIFICANT CHANGE UP
SALICYLATES SERPL-MCNC: <5 MG/DL — LOW (ref 15–30)
SAO2 % BLDA: 98.8 % — SIGNIFICANT CHANGE UP (ref 95–99)
SAO2 % BLDV: 63.4 % — SIGNIFICANT CHANGE UP (ref 60–85)
SAO2 % BLDV: 65.5 % — SIGNIFICANT CHANGE UP (ref 60–85)
SAO2 % BLDV: 71.8 % — SIGNIFICANT CHANGE UP (ref 60–85)
SAO2 % BLDV: 79.2 % — SIGNIFICANT CHANGE UP (ref 60–85)
SAO2 % BLDV: 79.3 % — SIGNIFICANT CHANGE UP
SAO2 % BLDV: 85.6 % — HIGH (ref 60–85)
SAO2 % BLDV: 86.9 % — HIGH (ref 60–85)
SAO2 % BLDV: 92.3 % — SIGNIFICANT CHANGE UP
SARS-COV-2 IGG+IGM SERPL QL IA: >250 U/ML — HIGH
SARS-COV-2 IGG+IGM SERPL QL IA: POSITIVE
SARS-COV-2 RNA SPEC QL NAA+PROBE: SIGNIFICANT CHANGE UP
SARS-COV-2 RNA SPEC QL NAA+PROBE: SIGNIFICANT CHANGE UP
SODIUM SERPL-SCNC: 131 MMOL/L — LOW (ref 135–145)
SODIUM SERPL-SCNC: 131 MMOL/L — LOW (ref 135–145)
SODIUM SERPL-SCNC: 132 MMOL/L — LOW (ref 135–145)
SODIUM SERPL-SCNC: 133 MMOL/L — LOW (ref 135–145)
SODIUM SERPL-SCNC: 134 MMOL/L — LOW (ref 135–145)
SODIUM SERPL-SCNC: 135 MMOL/L — SIGNIFICANT CHANGE UP (ref 135–145)
SODIUM SERPL-SCNC: 136 MMOL/L — SIGNIFICANT CHANGE UP (ref 135–145)
SODIUM SERPL-SCNC: 137 MMOL/L — SIGNIFICANT CHANGE UP (ref 135–145)
SODIUM SERPL-SCNC: 138 MMOL/L — SIGNIFICANT CHANGE UP (ref 135–145)
SODIUM SERPL-SCNC: 139 MMOL/L — SIGNIFICANT CHANGE UP (ref 135–145)
SODIUM SERPL-SCNC: 141 MMOL/L — SIGNIFICANT CHANGE UP (ref 135–145)
SODIUM SERPL-SCNC: 142 MMOL/L — SIGNIFICANT CHANGE UP (ref 135–145)
SP GR SPEC: 1.02 — SIGNIFICANT CHANGE UP (ref 1.01–1.02)
SP GR SPEC: 1.03 — HIGH (ref 1.01–1.02)
SPECIMEN SOURCE: SIGNIFICANT CHANGE UP
TOXICOLOGY SCREEN, DRUGS OF ABUSE, SERUM RESULT: SIGNIFICANT CHANGE UP
TROPONIN T, HIGH SENSITIVITY RESULT: 31 NG/L — SIGNIFICANT CHANGE UP
TROPONIN T, HIGH SENSITIVITY RESULT: 34 NG/L — SIGNIFICANT CHANGE UP
TSH SERPL-MCNC: 2.05 UIU/ML — SIGNIFICANT CHANGE UP (ref 0.27–4.2)
UROBILINOGEN FLD QL: ABNORMAL
UROBILINOGEN FLD QL: SIGNIFICANT CHANGE UP
VIT B12 SERPL-MCNC: 702 PG/ML — SIGNIFICANT CHANGE UP (ref 200–900)
WBC # BLD: 3.29 K/UL — LOW (ref 3.8–10.5)
WBC # BLD: 4.25 K/UL — SIGNIFICANT CHANGE UP (ref 3.8–10.5)
WBC # BLD: 4.47 K/UL — SIGNIFICANT CHANGE UP (ref 3.8–10.5)
WBC # BLD: 4.49 K/UL — SIGNIFICANT CHANGE UP (ref 3.8–10.5)
WBC # BLD: 4.54 K/UL — SIGNIFICANT CHANGE UP (ref 3.8–10.5)
WBC # BLD: 4.59 K/UL — SIGNIFICANT CHANGE UP (ref 3.8–10.5)
WBC # BLD: 4.61 K/UL — SIGNIFICANT CHANGE UP (ref 3.8–10.5)
WBC # BLD: 4.65 K/UL — SIGNIFICANT CHANGE UP (ref 3.8–10.5)
WBC # BLD: 4.66 K/UL — SIGNIFICANT CHANGE UP (ref 3.8–10.5)
WBC # BLD: 4.8 K/UL — SIGNIFICANT CHANGE UP (ref 3.8–10.5)
WBC # BLD: 4.86 K/UL — SIGNIFICANT CHANGE UP (ref 3.8–10.5)
WBC # BLD: 4.88 K/UL — SIGNIFICANT CHANGE UP (ref 3.8–10.5)
WBC # BLD: 4.92 K/UL — SIGNIFICANT CHANGE UP (ref 3.8–10.5)
WBC # BLD: 5 K/UL — SIGNIFICANT CHANGE UP (ref 3.8–10.5)
WBC # BLD: 5.06 K/UL — SIGNIFICANT CHANGE UP (ref 3.8–10.5)
WBC # BLD: 5.11 K/UL — SIGNIFICANT CHANGE UP (ref 3.8–10.5)
WBC # BLD: 5.15 K/UL — SIGNIFICANT CHANGE UP (ref 3.8–10.5)
WBC # BLD: 5.16 K/UL — SIGNIFICANT CHANGE UP (ref 3.8–10.5)
WBC # BLD: 5.17 K/UL — SIGNIFICANT CHANGE UP (ref 3.8–10.5)
WBC # BLD: 5.18 K/UL — SIGNIFICANT CHANGE UP (ref 3.8–10.5)
WBC # BLD: 5.34 K/UL — SIGNIFICANT CHANGE UP (ref 3.8–10.5)
WBC # BLD: 5.45 K/UL — SIGNIFICANT CHANGE UP (ref 3.8–10.5)
WBC # BLD: 5.47 K/UL — SIGNIFICANT CHANGE UP (ref 3.8–10.5)
WBC # BLD: 5.57 K/UL — SIGNIFICANT CHANGE UP (ref 3.8–10.5)
WBC # BLD: 5.64 K/UL — SIGNIFICANT CHANGE UP (ref 3.8–10.5)
WBC # BLD: 5.78 K/UL — SIGNIFICANT CHANGE UP (ref 3.8–10.5)
WBC # BLD: 5.96 K/UL — SIGNIFICANT CHANGE UP (ref 3.8–10.5)
WBC # BLD: 5.97 K/UL — SIGNIFICANT CHANGE UP (ref 3.8–10.5)
WBC # BLD: 5.98 K/UL — SIGNIFICANT CHANGE UP (ref 3.8–10.5)
WBC # BLD: 6.27 K/UL — SIGNIFICANT CHANGE UP (ref 3.8–10.5)
WBC # BLD: 6.41 K/UL — SIGNIFICANT CHANGE UP (ref 3.8–10.5)
WBC # BLD: 6.96 K/UL — SIGNIFICANT CHANGE UP (ref 3.8–10.5)
WBC # BLD: 9.68 K/UL — SIGNIFICANT CHANGE UP (ref 3.8–10.5)
WBC # FLD AUTO: 3.29 K/UL — LOW (ref 3.8–10.5)
WBC # FLD AUTO: 4.25 K/UL — SIGNIFICANT CHANGE UP (ref 3.8–10.5)
WBC # FLD AUTO: 4.47 K/UL — SIGNIFICANT CHANGE UP (ref 3.8–10.5)
WBC # FLD AUTO: 4.49 K/UL — SIGNIFICANT CHANGE UP (ref 3.8–10.5)
WBC # FLD AUTO: 4.54 K/UL — SIGNIFICANT CHANGE UP (ref 3.8–10.5)
WBC # FLD AUTO: 4.59 K/UL — SIGNIFICANT CHANGE UP (ref 3.8–10.5)
WBC # FLD AUTO: 4.61 K/UL — SIGNIFICANT CHANGE UP (ref 3.8–10.5)
WBC # FLD AUTO: 4.65 K/UL — SIGNIFICANT CHANGE UP (ref 3.8–10.5)
WBC # FLD AUTO: 4.66 K/UL — SIGNIFICANT CHANGE UP (ref 3.8–10.5)
WBC # FLD AUTO: 4.8 K/UL — SIGNIFICANT CHANGE UP (ref 3.8–10.5)
WBC # FLD AUTO: 4.86 K/UL — SIGNIFICANT CHANGE UP (ref 3.8–10.5)
WBC # FLD AUTO: 4.88 K/UL — SIGNIFICANT CHANGE UP (ref 3.8–10.5)
WBC # FLD AUTO: 4.92 K/UL — SIGNIFICANT CHANGE UP (ref 3.8–10.5)
WBC # FLD AUTO: 5 K/UL — SIGNIFICANT CHANGE UP (ref 3.8–10.5)
WBC # FLD AUTO: 5.06 K/UL — SIGNIFICANT CHANGE UP (ref 3.8–10.5)
WBC # FLD AUTO: 5.11 K/UL — SIGNIFICANT CHANGE UP (ref 3.8–10.5)
WBC # FLD AUTO: 5.15 K/UL — SIGNIFICANT CHANGE UP (ref 3.8–10.5)
WBC # FLD AUTO: 5.16 K/UL — SIGNIFICANT CHANGE UP (ref 3.8–10.5)
WBC # FLD AUTO: 5.17 K/UL — SIGNIFICANT CHANGE UP (ref 3.8–10.5)
WBC # FLD AUTO: 5.18 K/UL — SIGNIFICANT CHANGE UP (ref 3.8–10.5)
WBC # FLD AUTO: 5.34 K/UL — SIGNIFICANT CHANGE UP (ref 3.8–10.5)
WBC # FLD AUTO: 5.45 K/UL — SIGNIFICANT CHANGE UP (ref 3.8–10.5)
WBC # FLD AUTO: 5.47 K/UL — SIGNIFICANT CHANGE UP (ref 3.8–10.5)
WBC # FLD AUTO: 5.57 K/UL — SIGNIFICANT CHANGE UP (ref 3.8–10.5)
WBC # FLD AUTO: 5.64 K/UL — SIGNIFICANT CHANGE UP (ref 3.8–10.5)
WBC # FLD AUTO: 5.78 K/UL — SIGNIFICANT CHANGE UP (ref 3.8–10.5)
WBC # FLD AUTO: 5.96 K/UL — SIGNIFICANT CHANGE UP (ref 3.8–10.5)
WBC # FLD AUTO: 5.97 K/UL — SIGNIFICANT CHANGE UP (ref 3.8–10.5)
WBC # FLD AUTO: 5.98 K/UL — SIGNIFICANT CHANGE UP (ref 3.8–10.5)
WBC # FLD AUTO: 6.27 K/UL — SIGNIFICANT CHANGE UP (ref 3.8–10.5)
WBC # FLD AUTO: 6.41 K/UL — SIGNIFICANT CHANGE UP (ref 3.8–10.5)
WBC # FLD AUTO: 6.96 K/UL — SIGNIFICANT CHANGE UP (ref 3.8–10.5)
WBC # FLD AUTO: 9.68 K/UL — SIGNIFICANT CHANGE UP (ref 3.8–10.5)
WBC UR QL: 2 /HPF — SIGNIFICANT CHANGE UP (ref 0–5)

## 2021-01-01 PROCEDURE — 99232 SBSQ HOSP IP/OBS MODERATE 35: CPT

## 2021-01-01 PROCEDURE — 71045 X-RAY EXAM CHEST 1 VIEW: CPT | Mod: 26

## 2021-01-01 PROCEDURE — 99221 1ST HOSP IP/OBS SF/LOW 40: CPT

## 2021-01-01 PROCEDURE — 73590 X-RAY EXAM OF LOWER LEG: CPT | Mod: 26,LT

## 2021-01-01 PROCEDURE — 88341 IMHCHEM/IMCYTCHM EA ADD ANTB: CPT | Mod: 26

## 2021-01-01 PROCEDURE — 99233 SBSQ HOSP IP/OBS HIGH 50: CPT

## 2021-01-01 PROCEDURE — 99291 CRITICAL CARE FIRST HOUR: CPT

## 2021-01-01 PROCEDURE — 99223 1ST HOSP IP/OBS HIGH 75: CPT

## 2021-01-01 PROCEDURE — 93308 TTE F-UP OR LMTD: CPT | Mod: 26,GC

## 2021-01-01 PROCEDURE — 99239 HOSP IP/OBS DSCHRG MGMT >30: CPT

## 2021-01-01 PROCEDURE — 93970 EXTREMITY STUDY: CPT | Mod: 26

## 2021-01-01 PROCEDURE — 93325 DOPPLER ECHO COLOR FLOW MAPG: CPT | Mod: 26,GC

## 2021-01-01 PROCEDURE — 99497 ADVNCD CARE PLAN 30 MIN: CPT | Mod: 25

## 2021-01-01 PROCEDURE — 99223 1ST HOSP IP/OBS HIGH 75: CPT | Mod: GC

## 2021-01-01 PROCEDURE — 73610 X-RAY EXAM OF ANKLE: CPT | Mod: 26,LT

## 2021-01-01 PROCEDURE — 71250 CT THORAX DX C-: CPT | Mod: 26

## 2021-01-01 PROCEDURE — 99358 PROLONG SERVICE W/O CONTACT: CPT

## 2021-01-01 PROCEDURE — 70450 CT HEAD/BRAIN W/O DYE: CPT | Mod: 26

## 2021-01-01 PROCEDURE — ZZZZZ: CPT

## 2021-01-01 PROCEDURE — 93321 DOPPLER ECHO F-UP/LMTD STD: CPT | Mod: 26

## 2021-01-01 PROCEDURE — 99221 1ST HOSP IP/OBS SF/LOW 40: CPT | Mod: GC

## 2021-01-01 PROCEDURE — 99222 1ST HOSP IP/OBS MODERATE 55: CPT

## 2021-01-01 PROCEDURE — 93306 TTE W/DOPPLER COMPLETE: CPT | Mod: 26

## 2021-01-01 PROCEDURE — 72125 CT NECK SPINE W/O DYE: CPT | Mod: 26

## 2021-01-01 PROCEDURE — 88305 TISSUE EXAM BY PATHOLOGIST: CPT | Mod: 26

## 2021-01-01 PROCEDURE — 99285 EMERGENCY DEPT VISIT HI MDM: CPT

## 2021-01-01 PROCEDURE — 88342 IMHCHEM/IMCYTCHM 1ST ANTB: CPT | Mod: 26

## 2021-01-01 PROCEDURE — 99222 1ST HOSP IP/OBS MODERATE 55: CPT | Mod: 25,GC

## 2021-01-01 PROCEDURE — 71275 CT ANGIOGRAPHY CHEST: CPT | Mod: 26

## 2021-01-01 PROCEDURE — 88112 CYTOPATH CELL ENHANCE TECH: CPT | Mod: 26

## 2021-01-01 PROCEDURE — 73562 X-RAY EXAM OF KNEE 3: CPT | Mod: 26,LT

## 2021-01-01 PROCEDURE — 76604 US EXAM CHEST: CPT | Mod: 26

## 2021-01-01 PROCEDURE — 99231 SBSQ HOSP IP/OBS SF/LOW 25: CPT

## 2021-01-01 PROCEDURE — 33019 PERQ PRCRD DRG INSJ CATH CT: CPT

## 2021-01-01 PROCEDURE — 99222 1ST HOSP IP/OBS MODERATE 55: CPT | Mod: 57

## 2021-01-01 PROCEDURE — 72170 X-RAY EXAM OF PELVIS: CPT | Mod: 26

## 2021-01-01 RX ORDER — ONDANSETRON 8 MG/1
4 TABLET, FILM COATED ORAL EVERY 8 HOURS
Refills: 0 | Status: DISCONTINUED | OUTPATIENT
Start: 2021-01-01 | End: 2021-01-01

## 2021-01-01 RX ORDER — ACETAMINOPHEN 500 MG
975 TABLET ORAL ONCE
Refills: 0 | Status: COMPLETED | OUTPATIENT
Start: 2021-01-01 | End: 2021-01-01

## 2021-01-01 RX ORDER — CEFTRIAXONE 500 MG/1
1000 INJECTION, POWDER, FOR SOLUTION INTRAMUSCULAR; INTRAVENOUS ONCE
Refills: 0 | Status: COMPLETED | OUTPATIENT
Start: 2021-01-01 | End: 2021-01-01

## 2021-01-01 RX ORDER — HEPARIN SODIUM 5000 [USP'U]/ML
6500 INJECTION INTRAVENOUS; SUBCUTANEOUS EVERY 6 HOURS
Refills: 0 | Status: DISCONTINUED | OUTPATIENT
Start: 2021-01-01 | End: 2021-01-01

## 2021-01-01 RX ORDER — LANOLIN ALCOHOL/MO/W.PET/CERES
3 CREAM (GRAM) TOPICAL ONCE
Refills: 0 | Status: COMPLETED | OUTPATIENT
Start: 2021-01-01 | End: 2021-01-01

## 2021-01-01 RX ORDER — ACETAMINOPHEN 500 MG
1000 TABLET ORAL ONCE
Refills: 0 | Status: DISCONTINUED | OUTPATIENT
Start: 2021-01-01 | End: 2021-01-01

## 2021-01-01 RX ORDER — LIDOCAINE 4 G/100G
1 CREAM TOPICAL
Qty: 7 | Refills: 0
Start: 2021-01-01 | End: 2021-08-23

## 2021-01-01 RX ORDER — FUROSEMIDE 40 MG
20 TABLET ORAL ONCE
Refills: 0 | Status: COMPLETED | OUTPATIENT
Start: 2021-01-01 | End: 2021-01-01

## 2021-01-01 RX ORDER — POLYETHYLENE GLYCOL 3350 17 G/17G
17 POWDER, FOR SOLUTION ORAL DAILY
Refills: 0 | Status: DISCONTINUED | OUTPATIENT
Start: 2021-01-01 | End: 2021-01-01

## 2021-01-01 RX ORDER — FUROSEMIDE 40 MG
40 TABLET ORAL ONCE
Refills: 0 | Status: COMPLETED | OUTPATIENT
Start: 2021-01-01 | End: 2021-01-01

## 2021-01-01 RX ORDER — IPRATROPIUM/ALBUTEROL SULFATE 18-103MCG
3 AEROSOL WITH ADAPTER (GRAM) INHALATION EVERY 6 HOURS
Refills: 0 | Status: DISCONTINUED | OUTPATIENT
Start: 2021-01-01 | End: 2021-01-01

## 2021-01-01 RX ORDER — BENZOCAINE AND MENTHOL 5; 1 G/100ML; G/100ML
1 LIQUID ORAL THREE TIMES A DAY
Refills: 0 | Status: DISCONTINUED | OUTPATIENT
Start: 2021-01-01 | End: 2021-01-01

## 2021-01-01 RX ORDER — DULOXETINE HYDROCHLORIDE 30 MG/1
1 CAPSULE, DELAYED RELEASE ORAL
Qty: 0 | Refills: 0 | DISCHARGE

## 2021-01-01 RX ORDER — LOSARTAN POTASSIUM 100 MG/1
0.5 TABLET, FILM COATED ORAL
Qty: 0 | Refills: 0 | DISCHARGE

## 2021-01-01 RX ORDER — ACETAMINOPHEN 500 MG
975 TABLET ORAL EVERY 6 HOURS
Refills: 0 | Status: COMPLETED | OUTPATIENT
Start: 2021-01-01 | End: 2021-01-01

## 2021-01-01 RX ORDER — FUROSEMIDE 40 MG
20 TABLET ORAL DAILY
Refills: 0 | Status: DISCONTINUED | OUTPATIENT
Start: 2021-01-01 | End: 2021-01-01

## 2021-01-01 RX ORDER — ASPIRIN/CALCIUM CARB/MAGNESIUM 324 MG
81 TABLET ORAL DAILY
Refills: 0 | Status: DISCONTINUED | OUTPATIENT
Start: 2021-01-01 | End: 2021-01-01

## 2021-01-01 RX ORDER — ASPIRIN/CALCIUM CARB/MAGNESIUM 324 MG
1 TABLET ORAL
Qty: 0 | Refills: 0 | DISCHARGE

## 2021-01-01 RX ORDER — ACETAMINOPHEN 500 MG
650 TABLET ORAL EVERY 6 HOURS
Refills: 0 | Status: DISCONTINUED | OUTPATIENT
Start: 2021-01-01 | End: 2021-01-01

## 2021-01-01 RX ORDER — HEPARIN SODIUM 5000 [USP'U]/ML
INJECTION INTRAVENOUS; SUBCUTANEOUS
Qty: 25000 | Refills: 0 | Status: DISCONTINUED | OUTPATIENT
Start: 2021-01-01 | End: 2021-01-01

## 2021-01-01 RX ORDER — AMPICILLIN SODIUM AND SULBACTAM SODIUM 250; 125 MG/ML; MG/ML
1.5 INJECTION, POWDER, FOR SUSPENSION INTRAMUSCULAR; INTRAVENOUS EVERY 6 HOURS
Refills: 0 | Status: DISCONTINUED | OUTPATIENT
Start: 2021-01-01 | End: 2021-01-01

## 2021-01-01 RX ORDER — ACETAMINOPHEN 500 MG
650 TABLET ORAL ONCE
Refills: 0 | Status: COMPLETED | OUTPATIENT
Start: 2021-01-01 | End: 2021-01-01

## 2021-01-01 RX ORDER — IPRATROPIUM/ALBUTEROL SULFATE 18-103MCG
3 AEROSOL WITH ADAPTER (GRAM) INHALATION ONCE
Refills: 0 | Status: DISCONTINUED | OUTPATIENT
Start: 2021-01-01 | End: 2021-01-01

## 2021-01-01 RX ORDER — FUROSEMIDE 40 MG
1 TABLET ORAL
Qty: 30 | Refills: 0
Start: 2021-01-01 | End: 2021-09-15

## 2021-01-01 RX ORDER — SENNA PLUS 8.6 MG/1
2 TABLET ORAL AT BEDTIME
Refills: 0 | Status: DISCONTINUED | OUTPATIENT
Start: 2021-01-01 | End: 2021-01-01

## 2021-01-01 RX ORDER — ACETAMINOPHEN 500 MG
1000 TABLET ORAL ONCE
Refills: 0 | Status: COMPLETED | OUTPATIENT
Start: 2021-01-01 | End: 2021-01-01

## 2021-01-01 RX ORDER — IPRATROPIUM/ALBUTEROL SULFATE 18-103MCG
3 AEROSOL WITH ADAPTER (GRAM) INHALATION ONCE
Refills: 0 | Status: COMPLETED | OUTPATIENT
Start: 2021-01-01 | End: 2021-01-01

## 2021-01-01 RX ORDER — CEFTRIAXONE 500 MG/1
1000 INJECTION, POWDER, FOR SOLUTION INTRAMUSCULAR; INTRAVENOUS EVERY 24 HOURS
Refills: 0 | Status: COMPLETED | OUTPATIENT
Start: 2021-01-01 | End: 2021-01-01

## 2021-01-01 RX ORDER — AMLODIPINE BESYLATE 2.5 MG/1
1 TABLET ORAL
Qty: 0 | Refills: 0 | DISCHARGE

## 2021-01-01 RX ORDER — AMPICILLIN SODIUM AND SULBACTAM SODIUM 250; 125 MG/ML; MG/ML
INJECTION, POWDER, FOR SUSPENSION INTRAMUSCULAR; INTRAVENOUS
Refills: 0 | Status: DISCONTINUED | OUTPATIENT
Start: 2021-01-01 | End: 2021-01-01

## 2021-01-01 RX ORDER — ENOXAPARIN SODIUM 100 MG/ML
40 INJECTION SUBCUTANEOUS DAILY
Refills: 0 | Status: DISCONTINUED | OUTPATIENT
Start: 2021-01-01 | End: 2021-01-01

## 2021-01-01 RX ORDER — AMLODIPINE BESYLATE 2.5 MG/1
10 TABLET ORAL DAILY
Refills: 0 | Status: DISCONTINUED | OUTPATIENT
Start: 2021-01-01 | End: 2021-01-01

## 2021-01-01 RX ORDER — ACETAMINOPHEN 500 MG
975 TABLET ORAL EVERY 8 HOURS
Refills: 0 | Status: DISCONTINUED | OUTPATIENT
Start: 2021-01-01 | End: 2021-01-01

## 2021-01-01 RX ORDER — HEPARIN SODIUM 5000 [USP'U]/ML
3000 INJECTION INTRAVENOUS; SUBCUTANEOUS EVERY 6 HOURS
Refills: 0 | Status: DISCONTINUED | OUTPATIENT
Start: 2021-01-01 | End: 2021-01-01

## 2021-01-01 RX ORDER — SODIUM CHLORIDE 9 MG/ML
500 INJECTION INTRAMUSCULAR; INTRAVENOUS; SUBCUTANEOUS ONCE
Refills: 0 | Status: COMPLETED | OUTPATIENT
Start: 2021-01-01 | End: 2021-01-01

## 2021-01-01 RX ORDER — AMLODIPINE BESYLATE 2.5 MG/1
5 TABLET ORAL DAILY
Refills: 0 | Status: DISCONTINUED | OUTPATIENT
Start: 2021-01-01 | End: 2021-01-01

## 2021-01-01 RX ORDER — MAGNESIUM SULFATE 500 MG/ML
2 VIAL (ML) INJECTION ONCE
Refills: 0 | Status: COMPLETED | OUTPATIENT
Start: 2021-01-01 | End: 2021-01-01

## 2021-01-01 RX ORDER — AMPICILLIN SODIUM AND SULBACTAM SODIUM 250; 125 MG/ML; MG/ML
1.5 INJECTION, POWDER, FOR SUSPENSION INTRAMUSCULAR; INTRAVENOUS ONCE
Refills: 0 | Status: COMPLETED | OUTPATIENT
Start: 2021-01-01 | End: 2021-01-01

## 2021-01-01 RX ORDER — AMLODIPINE BESYLATE 2.5 MG/1
1 TABLET ORAL
Qty: 0 | Refills: 0 | DISCHARGE
Start: 2021-01-01

## 2021-01-01 RX ORDER — LANOLIN ALCOHOL/MO/W.PET/CERES
3 CREAM (GRAM) TOPICAL AT BEDTIME
Refills: 0 | Status: DISCONTINUED | OUTPATIENT
Start: 2021-01-01 | End: 2021-01-01

## 2021-01-01 RX ORDER — LIDOCAINE 4 G/100G
1 CREAM TOPICAL DAILY
Refills: 0 | Status: DISCONTINUED | OUTPATIENT
Start: 2021-01-01 | End: 2021-01-01

## 2021-01-01 RX ADMIN — Medication 81 MILLIGRAM(S): at 11:41

## 2021-01-01 RX ADMIN — SENNA PLUS 2 TABLET(S): 8.6 TABLET ORAL at 22:34

## 2021-01-01 RX ADMIN — Medication 650 MILLIGRAM(S): at 19:43

## 2021-01-01 RX ADMIN — LIDOCAINE 1 PATCH: 4 CREAM TOPICAL at 23:28

## 2021-01-01 RX ADMIN — SENNA PLUS 2 TABLET(S): 8.6 TABLET ORAL at 21:56

## 2021-01-01 RX ADMIN — Medication 3 MILLIGRAM(S): at 01:10

## 2021-01-01 RX ADMIN — BENZOCAINE AND MENTHOL 1 LOZENGE: 5; 1 LIQUID ORAL at 21:57

## 2021-01-01 RX ADMIN — Medication 81 MILLIGRAM(S): at 13:12

## 2021-01-01 RX ADMIN — POLYETHYLENE GLYCOL 3350 17 GRAM(S): 17 POWDER, FOR SOLUTION ORAL at 12:15

## 2021-01-01 RX ADMIN — Medication 650 MILLIGRAM(S): at 02:13

## 2021-01-01 RX ADMIN — Medication 30 MILLILITER(S): at 01:56

## 2021-01-01 RX ADMIN — Medication 30 MILLILITER(S): at 21:28

## 2021-01-01 RX ADMIN — Medication 650 MILLIGRAM(S): at 05:17

## 2021-01-01 RX ADMIN — Medication 81 MILLIGRAM(S): at 11:47

## 2021-01-01 RX ADMIN — POLYETHYLENE GLYCOL 3350 17 GRAM(S): 17 POWDER, FOR SOLUTION ORAL at 12:31

## 2021-01-01 RX ADMIN — LIDOCAINE 1 PATCH: 4 CREAM TOPICAL at 23:55

## 2021-01-01 RX ADMIN — BENZOCAINE AND MENTHOL 1 LOZENGE: 5; 1 LIQUID ORAL at 13:20

## 2021-01-01 RX ADMIN — Medication 975 MILLIGRAM(S): at 06:18

## 2021-01-01 RX ADMIN — Medication 1000 MILLIGRAM(S): at 12:42

## 2021-01-01 RX ADMIN — Medication 81 MILLIGRAM(S): at 12:02

## 2021-01-01 RX ADMIN — Medication 81 MILLIGRAM(S): at 11:59

## 2021-01-01 RX ADMIN — SENNA PLUS 2 TABLET(S): 8.6 TABLET ORAL at 21:21

## 2021-01-01 RX ADMIN — Medication 20 MILLIGRAM(S): at 05:19

## 2021-01-01 RX ADMIN — Medication 1 TABLET(S): at 14:41

## 2021-01-01 RX ADMIN — LIDOCAINE 1 PATCH: 4 CREAM TOPICAL at 11:16

## 2021-01-01 RX ADMIN — LIDOCAINE 1 PATCH: 4 CREAM TOPICAL at 19:59

## 2021-01-01 RX ADMIN — LIDOCAINE 1 PATCH: 4 CREAM TOPICAL at 02:32

## 2021-01-01 RX ADMIN — Medication 20 MILLIGRAM(S): at 05:10

## 2021-01-01 RX ADMIN — Medication 975 MILLIGRAM(S): at 21:20

## 2021-01-01 RX ADMIN — Medication 650 MILLIGRAM(S): at 03:13

## 2021-01-01 RX ADMIN — Medication 81 MILLIGRAM(S): at 12:23

## 2021-01-01 RX ADMIN — POLYETHYLENE GLYCOL 3350 17 GRAM(S): 17 POWDER, FOR SOLUTION ORAL at 12:09

## 2021-01-01 RX ADMIN — POLYETHYLENE GLYCOL 3350 17 GRAM(S): 17 POWDER, FOR SOLUTION ORAL at 11:15

## 2021-01-01 RX ADMIN — Medication 650 MILLIGRAM(S): at 16:13

## 2021-01-01 RX ADMIN — Medication 20 MILLIGRAM(S): at 05:55

## 2021-01-01 RX ADMIN — CEFTRIAXONE 100 MILLIGRAM(S): 500 INJECTION, POWDER, FOR SOLUTION INTRAMUSCULAR; INTRAVENOUS at 13:20

## 2021-01-01 RX ADMIN — Medication 975 MILLIGRAM(S): at 00:25

## 2021-01-01 RX ADMIN — AMLODIPINE BESYLATE 10 MILLIGRAM(S): 2.5 TABLET ORAL at 05:17

## 2021-01-01 RX ADMIN — LIDOCAINE 1 PATCH: 4 CREAM TOPICAL at 14:20

## 2021-01-01 RX ADMIN — Medication 650 MILLIGRAM(S): at 19:19

## 2021-01-01 RX ADMIN — LIDOCAINE 1 PATCH: 4 CREAM TOPICAL at 19:43

## 2021-01-01 RX ADMIN — LIDOCAINE 1 PATCH: 4 CREAM TOPICAL at 11:34

## 2021-01-01 RX ADMIN — POLYETHYLENE GLYCOL 3350 17 GRAM(S): 17 POWDER, FOR SOLUTION ORAL at 14:45

## 2021-01-01 RX ADMIN — LIDOCAINE 1 PATCH: 4 CREAM TOPICAL at 18:00

## 2021-01-01 RX ADMIN — Medication 20 MILLIGRAM(S): at 05:18

## 2021-01-01 RX ADMIN — Medication 81 MILLIGRAM(S): at 12:16

## 2021-01-01 RX ADMIN — Medication 975 MILLIGRAM(S): at 12:09

## 2021-01-01 RX ADMIN — AMLODIPINE BESYLATE 10 MILLIGRAM(S): 2.5 TABLET ORAL at 06:09

## 2021-01-01 RX ADMIN — LIDOCAINE 1 PATCH: 4 CREAM TOPICAL at 00:00

## 2021-01-01 RX ADMIN — Medication 975 MILLIGRAM(S): at 23:49

## 2021-01-01 RX ADMIN — Medication 30 MILLILITER(S): at 23:52

## 2021-01-01 RX ADMIN — LIDOCAINE 1 PATCH: 4 CREAM TOPICAL at 12:03

## 2021-01-01 RX ADMIN — BENZOCAINE AND MENTHOL 1 LOZENGE: 5; 1 LIQUID ORAL at 13:30

## 2021-01-01 RX ADMIN — LIDOCAINE 1 PATCH: 4 CREAM TOPICAL at 11:36

## 2021-01-01 RX ADMIN — AMLODIPINE BESYLATE 5 MILLIGRAM(S): 2.5 TABLET ORAL at 14:41

## 2021-01-01 RX ADMIN — AMLODIPINE BESYLATE 10 MILLIGRAM(S): 2.5 TABLET ORAL at 06:31

## 2021-01-01 RX ADMIN — Medication 975 MILLIGRAM(S): at 03:40

## 2021-01-01 RX ADMIN — ENOXAPARIN SODIUM 40 MILLIGRAM(S): 100 INJECTION SUBCUTANEOUS at 12:18

## 2021-01-01 RX ADMIN — AMPICILLIN SODIUM AND SULBACTAM SODIUM 100 GRAM(S): 250; 125 INJECTION, POWDER, FOR SUSPENSION INTRAMUSCULAR; INTRAVENOUS at 04:05

## 2021-01-01 RX ADMIN — AMLODIPINE BESYLATE 10 MILLIGRAM(S): 2.5 TABLET ORAL at 06:42

## 2021-01-01 RX ADMIN — POLYETHYLENE GLYCOL 3350 17 GRAM(S): 17 POWDER, FOR SOLUTION ORAL at 11:56

## 2021-01-01 RX ADMIN — LIDOCAINE 1 PATCH: 4 CREAM TOPICAL at 20:54

## 2021-01-01 RX ADMIN — Medication 650 MILLIGRAM(S): at 22:10

## 2021-01-01 RX ADMIN — LIDOCAINE 1 PATCH: 4 CREAM TOPICAL at 18:36

## 2021-01-01 RX ADMIN — AMPICILLIN SODIUM AND SULBACTAM SODIUM 100 GRAM(S): 250; 125 INJECTION, POWDER, FOR SUSPENSION INTRAMUSCULAR; INTRAVENOUS at 01:19

## 2021-01-01 RX ADMIN — LIDOCAINE 1 PATCH: 4 CREAM TOPICAL at 22:53

## 2021-01-01 RX ADMIN — AMPICILLIN SODIUM AND SULBACTAM SODIUM 100 GRAM(S): 250; 125 INJECTION, POWDER, FOR SUSPENSION INTRAMUSCULAR; INTRAVENOUS at 05:02

## 2021-01-01 RX ADMIN — Medication 650 MILLIGRAM(S): at 20:43

## 2021-01-01 RX ADMIN — Medication 81 MILLIGRAM(S): at 11:29

## 2021-01-01 RX ADMIN — AMLODIPINE BESYLATE 10 MILLIGRAM(S): 2.5 TABLET ORAL at 06:11

## 2021-01-01 RX ADMIN — Medication 40 MILLIGRAM(S): at 06:50

## 2021-01-01 RX ADMIN — POLYETHYLENE GLYCOL 3350 17 GRAM(S): 17 POWDER, FOR SOLUTION ORAL at 11:46

## 2021-01-01 RX ADMIN — BENZOCAINE AND MENTHOL 1 LOZENGE: 5; 1 LIQUID ORAL at 06:42

## 2021-01-01 RX ADMIN — LIDOCAINE 1 PATCH: 4 CREAM TOPICAL at 02:14

## 2021-01-01 RX ADMIN — Medication 975 MILLIGRAM(S): at 13:04

## 2021-01-01 RX ADMIN — AMLODIPINE BESYLATE 5 MILLIGRAM(S): 2.5 TABLET ORAL at 06:28

## 2021-01-01 RX ADMIN — LIDOCAINE 1 PATCH: 4 CREAM TOPICAL at 19:48

## 2021-01-01 RX ADMIN — AMPICILLIN SODIUM AND SULBACTAM SODIUM 100 GRAM(S): 250; 125 INJECTION, POWDER, FOR SUSPENSION INTRAMUSCULAR; INTRAVENOUS at 12:18

## 2021-01-01 RX ADMIN — SENNA PLUS 2 TABLET(S): 8.6 TABLET ORAL at 22:44

## 2021-01-01 RX ADMIN — AMLODIPINE BESYLATE 10 MILLIGRAM(S): 2.5 TABLET ORAL at 06:36

## 2021-01-01 RX ADMIN — Medication 20 MILLIGRAM(S): at 06:21

## 2021-01-01 RX ADMIN — LIDOCAINE 1 PATCH: 4 CREAM TOPICAL at 19:45

## 2021-01-01 RX ADMIN — Medication 3 MILLIGRAM(S): at 21:26

## 2021-01-01 RX ADMIN — Medication 81 MILLIGRAM(S): at 12:21

## 2021-01-01 RX ADMIN — Medication 30 MILLILITER(S): at 08:08

## 2021-01-01 RX ADMIN — SENNA PLUS 2 TABLET(S): 8.6 TABLET ORAL at 22:37

## 2021-01-01 RX ADMIN — Medication 20 MILLIGRAM(S): at 05:13

## 2021-01-01 RX ADMIN — LIDOCAINE 1 PATCH: 4 CREAM TOPICAL at 19:46

## 2021-01-01 RX ADMIN — LIDOCAINE 1 PATCH: 4 CREAM TOPICAL at 23:00

## 2021-01-01 RX ADMIN — Medication 400 MILLIGRAM(S): at 12:27

## 2021-01-01 RX ADMIN — LIDOCAINE 1 PATCH: 4 CREAM TOPICAL at 00:57

## 2021-01-01 RX ADMIN — AMPICILLIN SODIUM AND SULBACTAM SODIUM 100 GRAM(S): 250; 125 INJECTION, POWDER, FOR SUSPENSION INTRAMUSCULAR; INTRAVENOUS at 17:32

## 2021-01-01 RX ADMIN — AMPICILLIN SODIUM AND SULBACTAM SODIUM 100 GRAM(S): 250; 125 INJECTION, POWDER, FOR SUSPENSION INTRAMUSCULAR; INTRAVENOUS at 23:34

## 2021-01-01 RX ADMIN — LIDOCAINE 1 PATCH: 4 CREAM TOPICAL at 19:47

## 2021-01-01 RX ADMIN — Medication 81 MILLIGRAM(S): at 11:56

## 2021-01-01 RX ADMIN — Medication 650 MILLIGRAM(S): at 18:19

## 2021-01-01 RX ADMIN — AMPICILLIN SODIUM AND SULBACTAM SODIUM 100 GRAM(S): 250; 125 INJECTION, POWDER, FOR SUSPENSION INTRAMUSCULAR; INTRAVENOUS at 23:46

## 2021-01-01 RX ADMIN — Medication 975 MILLIGRAM(S): at 01:00

## 2021-01-01 RX ADMIN — AMLODIPINE BESYLATE 5 MILLIGRAM(S): 2.5 TABLET ORAL at 05:24

## 2021-01-01 RX ADMIN — AMPICILLIN SODIUM AND SULBACTAM SODIUM 100 GRAM(S): 250; 125 INJECTION, POWDER, FOR SUSPENSION INTRAMUSCULAR; INTRAVENOUS at 05:03

## 2021-01-01 RX ADMIN — BENZOCAINE AND MENTHOL 1 LOZENGE: 5; 1 LIQUID ORAL at 13:11

## 2021-01-01 RX ADMIN — BENZOCAINE AND MENTHOL 1 LOZENGE: 5; 1 LIQUID ORAL at 05:10

## 2021-01-01 RX ADMIN — BENZOCAINE AND MENTHOL 1 LOZENGE: 5; 1 LIQUID ORAL at 05:22

## 2021-01-01 RX ADMIN — LIDOCAINE 1 PATCH: 4 CREAM TOPICAL at 12:12

## 2021-01-01 RX ADMIN — Medication 650 MILLIGRAM(S): at 23:29

## 2021-01-01 RX ADMIN — Medication 3 MILLIGRAM(S): at 22:49

## 2021-01-01 RX ADMIN — HEPARIN SODIUM 0 UNIT(S)/HR: 5000 INJECTION INTRAVENOUS; SUBCUTANEOUS at 04:35

## 2021-01-01 RX ADMIN — Medication 81 MILLIGRAM(S): at 12:52

## 2021-01-01 RX ADMIN — Medication 81 MILLIGRAM(S): at 11:45

## 2021-01-01 RX ADMIN — Medication 20 MILLIGRAM(S): at 05:22

## 2021-01-01 RX ADMIN — Medication 81 MILLIGRAM(S): at 12:09

## 2021-01-01 RX ADMIN — POLYETHYLENE GLYCOL 3350 17 GRAM(S): 17 POWDER, FOR SOLUTION ORAL at 13:11

## 2021-01-01 RX ADMIN — Medication 975 MILLIGRAM(S): at 13:33

## 2021-01-01 RX ADMIN — Medication 30 MILLILITER(S): at 20:59

## 2021-01-01 RX ADMIN — LIDOCAINE 1 PATCH: 4 CREAM TOPICAL at 18:44

## 2021-01-01 RX ADMIN — LIDOCAINE 1 PATCH: 4 CREAM TOPICAL at 23:35

## 2021-01-01 RX ADMIN — BENZOCAINE AND MENTHOL 1 LOZENGE: 5; 1 LIQUID ORAL at 14:25

## 2021-01-01 RX ADMIN — AMPICILLIN SODIUM AND SULBACTAM SODIUM 100 GRAM(S): 250; 125 INJECTION, POWDER, FOR SUSPENSION INTRAMUSCULAR; INTRAVENOUS at 17:45

## 2021-01-01 RX ADMIN — Medication 81 MILLIGRAM(S): at 14:41

## 2021-01-01 RX ADMIN — Medication 1 TABLET(S): at 13:12

## 2021-01-01 RX ADMIN — Medication 30 MILLILITER(S): at 21:27

## 2021-01-01 RX ADMIN — LIDOCAINE 1 PATCH: 4 CREAM TOPICAL at 18:39

## 2021-01-01 RX ADMIN — ONDANSETRON 4 MILLIGRAM(S): 8 TABLET, FILM COATED ORAL at 23:53

## 2021-01-01 RX ADMIN — LIDOCAINE 1 PATCH: 4 CREAM TOPICAL at 19:33

## 2021-01-01 RX ADMIN — Medication 20 MILLIGRAM(S): at 06:37

## 2021-01-01 RX ADMIN — AMLODIPINE BESYLATE 10 MILLIGRAM(S): 2.5 TABLET ORAL at 05:12

## 2021-01-01 RX ADMIN — BENZOCAINE AND MENTHOL 1 LOZENGE: 5; 1 LIQUID ORAL at 21:11

## 2021-01-01 RX ADMIN — Medication 20 MILLIGRAM(S): at 05:34

## 2021-01-01 RX ADMIN — Medication 20 MILLIGRAM(S): at 06:02

## 2021-01-01 RX ADMIN — LIDOCAINE 1 PATCH: 4 CREAM TOPICAL at 13:31

## 2021-01-01 RX ADMIN — LIDOCAINE 1 PATCH: 4 CREAM TOPICAL at 11:56

## 2021-01-01 RX ADMIN — AMLODIPINE BESYLATE 10 MILLIGRAM(S): 2.5 TABLET ORAL at 05:32

## 2021-01-01 RX ADMIN — Medication 1 TABLET(S): at 13:39

## 2021-01-01 RX ADMIN — POLYETHYLENE GLYCOL 3350 17 GRAM(S): 17 POWDER, FOR SOLUTION ORAL at 12:22

## 2021-01-01 RX ADMIN — Medication 30 MILLILITER(S): at 06:44

## 2021-01-01 RX ADMIN — LIDOCAINE 1 PATCH: 4 CREAM TOPICAL at 19:28

## 2021-01-01 RX ADMIN — LIDOCAINE 1 PATCH: 4 CREAM TOPICAL at 19:38

## 2021-01-01 RX ADMIN — AMPICILLIN SODIUM AND SULBACTAM SODIUM 100 GRAM(S): 250; 125 INJECTION, POWDER, FOR SUSPENSION INTRAMUSCULAR; INTRAVENOUS at 12:30

## 2021-01-01 RX ADMIN — SENNA PLUS 2 TABLET(S): 8.6 TABLET ORAL at 21:25

## 2021-01-01 RX ADMIN — Medication 30 MILLILITER(S): at 00:08

## 2021-01-01 RX ADMIN — AMLODIPINE BESYLATE 10 MILLIGRAM(S): 2.5 TABLET ORAL at 05:28

## 2021-01-01 RX ADMIN — Medication 81 MILLIGRAM(S): at 12:19

## 2021-01-01 RX ADMIN — Medication 20 MILLIGRAM(S): at 06:31

## 2021-01-01 RX ADMIN — Medication 20 MILLIGRAM(S): at 05:17

## 2021-01-01 RX ADMIN — AMLODIPINE BESYLATE 5 MILLIGRAM(S): 2.5 TABLET ORAL at 05:13

## 2021-01-01 RX ADMIN — AMLODIPINE BESYLATE 10 MILLIGRAM(S): 2.5 TABLET ORAL at 06:02

## 2021-01-01 RX ADMIN — LIDOCAINE 1 PATCH: 4 CREAM TOPICAL at 00:20

## 2021-01-01 RX ADMIN — Medication 20 MILLIGRAM(S): at 05:48

## 2021-01-01 RX ADMIN — Medication 3 MILLIGRAM(S): at 22:35

## 2021-01-01 RX ADMIN — Medication 1 TABLET(S): at 14:44

## 2021-01-01 RX ADMIN — AMPICILLIN SODIUM AND SULBACTAM SODIUM 100 GRAM(S): 250; 125 INJECTION, POWDER, FOR SUSPENSION INTRAMUSCULAR; INTRAVENOUS at 18:41

## 2021-01-01 RX ADMIN — SODIUM CHLORIDE 500 MILLILITER(S): 9 INJECTION INTRAMUSCULAR; INTRAVENOUS; SUBCUTANEOUS at 13:34

## 2021-01-01 RX ADMIN — LIDOCAINE 1 PATCH: 4 CREAM TOPICAL at 19:42

## 2021-01-01 RX ADMIN — SENNA PLUS 2 TABLET(S): 8.6 TABLET ORAL at 21:01

## 2021-01-01 RX ADMIN — Medication 81 MILLIGRAM(S): at 12:30

## 2021-01-01 RX ADMIN — BENZOCAINE AND MENTHOL 1 LOZENGE: 5; 1 LIQUID ORAL at 22:17

## 2021-01-01 RX ADMIN — Medication 1 TABLET(S): at 11:36

## 2021-01-01 RX ADMIN — CEFTRIAXONE 100 MILLIGRAM(S): 500 INJECTION, POWDER, FOR SOLUTION INTRAMUSCULAR; INTRAVENOUS at 12:00

## 2021-01-01 RX ADMIN — Medication 650 MILLIGRAM(S): at 00:29

## 2021-01-01 RX ADMIN — BENZOCAINE AND MENTHOL 1 LOZENGE: 5; 1 LIQUID ORAL at 05:49

## 2021-01-01 RX ADMIN — LIDOCAINE 1 PATCH: 4 CREAM TOPICAL at 11:46

## 2021-01-01 RX ADMIN — SENNA PLUS 2 TABLET(S): 8.6 TABLET ORAL at 21:09

## 2021-01-01 RX ADMIN — AMLODIPINE BESYLATE 10 MILLIGRAM(S): 2.5 TABLET ORAL at 05:34

## 2021-01-01 RX ADMIN — AMLODIPINE BESYLATE 10 MILLIGRAM(S): 2.5 TABLET ORAL at 05:19

## 2021-01-01 RX ADMIN — Medication 1 TABLET(S): at 12:22

## 2021-01-01 RX ADMIN — Medication 1 TABLET(S): at 12:16

## 2021-01-01 RX ADMIN — AMLODIPINE BESYLATE 10 MILLIGRAM(S): 2.5 TABLET ORAL at 05:03

## 2021-01-01 RX ADMIN — POLYETHYLENE GLYCOL 3350 17 GRAM(S): 17 POWDER, FOR SOLUTION ORAL at 11:17

## 2021-01-01 RX ADMIN — Medication 20 MILLIGRAM(S): at 05:16

## 2021-01-01 RX ADMIN — Medication 650 MILLIGRAM(S): at 14:14

## 2021-01-01 RX ADMIN — AMPICILLIN SODIUM AND SULBACTAM SODIUM 100 GRAM(S): 250; 125 INJECTION, POWDER, FOR SUSPENSION INTRAMUSCULAR; INTRAVENOUS at 16:59

## 2021-01-01 RX ADMIN — Medication 81 MILLIGRAM(S): at 12:11

## 2021-01-01 RX ADMIN — HEPARIN SODIUM 1400 UNIT(S)/HR: 5000 INJECTION INTRAVENOUS; SUBCUTANEOUS at 15:32

## 2021-01-01 RX ADMIN — Medication 650 MILLIGRAM(S): at 13:14

## 2021-01-01 RX ADMIN — Medication 81 MILLIGRAM(S): at 11:17

## 2021-01-01 RX ADMIN — Medication 30 MILLILITER(S): at 17:38

## 2021-01-01 RX ADMIN — Medication 3 MILLIGRAM(S): at 23:03

## 2021-01-01 RX ADMIN — BENZOCAINE AND MENTHOL 1 LOZENGE: 5; 1 LIQUID ORAL at 21:14

## 2021-01-01 RX ADMIN — AMPICILLIN SODIUM AND SULBACTAM SODIUM 100 GRAM(S): 250; 125 INJECTION, POWDER, FOR SUSPENSION INTRAMUSCULAR; INTRAVENOUS at 01:14

## 2021-01-01 RX ADMIN — Medication 1 TABLET(S): at 11:58

## 2021-01-01 RX ADMIN — Medication 81 MILLIGRAM(S): at 11:42

## 2021-01-01 RX ADMIN — LIDOCAINE 1 PATCH: 4 CREAM TOPICAL at 01:30

## 2021-01-01 RX ADMIN — POLYETHYLENE GLYCOL 3350 17 GRAM(S): 17 POWDER, FOR SOLUTION ORAL at 11:41

## 2021-01-01 RX ADMIN — LIDOCAINE 1 PATCH: 4 CREAM TOPICAL at 23:58

## 2021-01-01 RX ADMIN — Medication 650 MILLIGRAM(S): at 06:17

## 2021-01-01 RX ADMIN — Medication 81 MILLIGRAM(S): at 11:16

## 2021-01-01 RX ADMIN — Medication 3 MILLIGRAM(S): at 23:38

## 2021-01-01 RX ADMIN — Medication 3 MILLIGRAM(S): at 21:50

## 2021-01-01 RX ADMIN — Medication 1 TABLET(S): at 12:30

## 2021-01-01 RX ADMIN — Medication 30 MILLILITER(S): at 11:16

## 2021-01-01 RX ADMIN — Medication 3 MILLIGRAM(S): at 21:15

## 2021-01-01 RX ADMIN — BENZOCAINE AND MENTHOL 1 LOZENGE: 5; 1 LIQUID ORAL at 06:27

## 2021-01-01 RX ADMIN — AMPICILLIN SODIUM AND SULBACTAM SODIUM 100 GRAM(S): 250; 125 INJECTION, POWDER, FOR SUSPENSION INTRAMUSCULAR; INTRAVENOUS at 18:14

## 2021-01-01 RX ADMIN — CEFTRIAXONE 100 MILLIGRAM(S): 500 INJECTION, POWDER, FOR SOLUTION INTRAMUSCULAR; INTRAVENOUS at 12:55

## 2021-01-01 RX ADMIN — AMPICILLIN SODIUM AND SULBACTAM SODIUM 100 GRAM(S): 250; 125 INJECTION, POWDER, FOR SUSPENSION INTRAMUSCULAR; INTRAVENOUS at 12:09

## 2021-01-01 RX ADMIN — AMLODIPINE BESYLATE 10 MILLIGRAM(S): 2.5 TABLET ORAL at 05:02

## 2021-01-01 RX ADMIN — Medication 1 TABLET(S): at 11:29

## 2021-01-01 RX ADMIN — LIDOCAINE 1 PATCH: 4 CREAM TOPICAL at 12:23

## 2021-01-01 RX ADMIN — Medication 1 TABLET(S): at 11:47

## 2021-01-01 RX ADMIN — AMLODIPINE BESYLATE 10 MILLIGRAM(S): 2.5 TABLET ORAL at 06:25

## 2021-01-01 RX ADMIN — Medication 81 MILLIGRAM(S): at 11:57

## 2021-01-01 RX ADMIN — SENNA PLUS 2 TABLET(S): 8.6 TABLET ORAL at 22:02

## 2021-01-01 RX ADMIN — CEFTRIAXONE 100 MILLIGRAM(S): 500 INJECTION, POWDER, FOR SOLUTION INTRAMUSCULAR; INTRAVENOUS at 14:42

## 2021-01-01 RX ADMIN — CEFTRIAXONE 1000 MILLIGRAM(S): 500 INJECTION, POWDER, FOR SOLUTION INTRAMUSCULAR; INTRAVENOUS at 13:33

## 2021-01-01 RX ADMIN — Medication 3 MILLIGRAM(S): at 21:28

## 2021-01-01 RX ADMIN — LIDOCAINE 1 PATCH: 4 CREAM TOPICAL at 21:07

## 2021-01-01 RX ADMIN — LIDOCAINE 1 PATCH: 4 CREAM TOPICAL at 11:41

## 2021-01-01 RX ADMIN — Medication 81 MILLIGRAM(S): at 11:36

## 2021-01-01 RX ADMIN — LIDOCAINE 1 PATCH: 4 CREAM TOPICAL at 00:10

## 2021-01-01 RX ADMIN — BENZOCAINE AND MENTHOL 1 LOZENGE: 5; 1 LIQUID ORAL at 06:09

## 2021-01-01 RX ADMIN — Medication 650 MILLIGRAM(S): at 14:01

## 2021-01-01 RX ADMIN — AMPICILLIN SODIUM AND SULBACTAM SODIUM 100 GRAM(S): 250; 125 INJECTION, POWDER, FOR SUSPENSION INTRAMUSCULAR; INTRAVENOUS at 06:25

## 2021-01-01 RX ADMIN — AMPICILLIN SODIUM AND SULBACTAM SODIUM 100 GRAM(S): 250; 125 INJECTION, POWDER, FOR SUSPENSION INTRAMUSCULAR; INTRAVENOUS at 14:15

## 2021-01-01 RX ADMIN — BENZOCAINE AND MENTHOL 1 LOZENGE: 5; 1 LIQUID ORAL at 22:33

## 2021-01-01 RX ADMIN — LIDOCAINE 1 PATCH: 4 CREAM TOPICAL at 00:53

## 2021-01-01 RX ADMIN — Medication 975 MILLIGRAM(S): at 14:18

## 2021-01-01 RX ADMIN — Medication 975 MILLIGRAM(S): at 18:41

## 2021-01-01 RX ADMIN — LIDOCAINE 1 PATCH: 4 CREAM TOPICAL at 23:20

## 2021-01-01 RX ADMIN — Medication 30 MILLILITER(S): at 03:26

## 2021-01-01 RX ADMIN — AMLODIPINE BESYLATE 10 MILLIGRAM(S): 2.5 TABLET ORAL at 05:16

## 2021-01-01 RX ADMIN — Medication 20 MILLIGRAM(S): at 06:28

## 2021-01-01 RX ADMIN — Medication 650 MILLIGRAM(S): at 22:36

## 2021-01-01 RX ADMIN — AMLODIPINE BESYLATE 10 MILLIGRAM(S): 2.5 TABLET ORAL at 06:38

## 2021-01-01 RX ADMIN — Medication 650 MILLIGRAM(S): at 03:26

## 2021-01-01 RX ADMIN — Medication 3 MILLIGRAM(S): at 21:25

## 2021-01-01 RX ADMIN — Medication 30 MILLILITER(S): at 15:34

## 2021-01-01 RX ADMIN — BENZOCAINE AND MENTHOL 1 LOZENGE: 5; 1 LIQUID ORAL at 05:56

## 2021-01-01 RX ADMIN — SENNA PLUS 2 TABLET(S): 8.6 TABLET ORAL at 21:26

## 2021-01-01 RX ADMIN — Medication 30 MILLILITER(S): at 11:13

## 2021-01-01 RX ADMIN — SENNA PLUS 2 TABLET(S): 8.6 TABLET ORAL at 21:14

## 2021-01-01 RX ADMIN — LIDOCAINE 1 PATCH: 4 CREAM TOPICAL at 12:31

## 2021-01-01 RX ADMIN — LIDOCAINE 1 PATCH: 4 CREAM TOPICAL at 12:10

## 2021-01-01 RX ADMIN — Medication 50 GRAM(S): at 10:58

## 2021-01-01 RX ADMIN — Medication 975 MILLIGRAM(S): at 05:03

## 2021-01-01 RX ADMIN — Medication 81 MILLIGRAM(S): at 12:13

## 2021-01-01 RX ADMIN — Medication 975 MILLIGRAM(S): at 22:15

## 2021-01-01 RX ADMIN — Medication 20 MILLIGRAM(S): at 17:45

## 2021-01-01 RX ADMIN — LIDOCAINE 1 PATCH: 4 CREAM TOPICAL at 14:42

## 2021-01-01 RX ADMIN — LIDOCAINE 1 PATCH: 4 CREAM TOPICAL at 00:19

## 2021-01-01 RX ADMIN — Medication 650 MILLIGRAM(S): at 21:10

## 2021-01-01 RX ADMIN — AMPICILLIN SODIUM AND SULBACTAM SODIUM 100 GRAM(S): 250; 125 INJECTION, POWDER, FOR SUSPENSION INTRAMUSCULAR; INTRAVENOUS at 02:35

## 2021-01-01 RX ADMIN — Medication 81 MILLIGRAM(S): at 12:18

## 2021-01-01 RX ADMIN — SODIUM CHLORIDE 500 MILLILITER(S): 9 INJECTION INTRAMUSCULAR; INTRAVENOUS; SUBCUTANEOUS at 12:00

## 2021-01-01 RX ADMIN — LIDOCAINE 1 PATCH: 4 CREAM TOPICAL at 00:26

## 2021-01-01 RX ADMIN — Medication 30 MILLILITER(S): at 22:27

## 2021-01-01 RX ADMIN — Medication 650 MILLIGRAM(S): at 17:13

## 2021-01-01 RX ADMIN — Medication 975 MILLIGRAM(S): at 02:40

## 2021-01-01 RX ADMIN — POLYETHYLENE GLYCOL 3350 17 GRAM(S): 17 POWDER, FOR SOLUTION ORAL at 12:12

## 2021-01-01 RX ADMIN — Medication 20 MILLIGRAM(S): at 06:38

## 2021-01-01 RX ADMIN — AMPICILLIN SODIUM AND SULBACTAM SODIUM 100 GRAM(S): 250; 125 INJECTION, POWDER, FOR SUSPENSION INTRAMUSCULAR; INTRAVENOUS at 12:17

## 2021-01-01 RX ADMIN — LIDOCAINE 1 PATCH: 4 CREAM TOPICAL at 12:16

## 2021-01-01 RX ADMIN — POLYETHYLENE GLYCOL 3350 17 GRAM(S): 17 POWDER, FOR SOLUTION ORAL at 11:58

## 2021-01-01 RX ADMIN — AMPICILLIN SODIUM AND SULBACTAM SODIUM 100 GRAM(S): 250; 125 INJECTION, POWDER, FOR SUSPENSION INTRAMUSCULAR; INTRAVENOUS at 06:28

## 2021-01-01 RX ADMIN — Medication 20 MILLIGRAM(S): at 05:03

## 2021-01-01 RX ADMIN — POLYETHYLENE GLYCOL 3350 17 GRAM(S): 17 POWDER, FOR SOLUTION ORAL at 14:41

## 2021-01-01 RX ADMIN — AMPICILLIN SODIUM AND SULBACTAM SODIUM 100 GRAM(S): 250; 125 INJECTION, POWDER, FOR SUSPENSION INTRAMUSCULAR; INTRAVENOUS at 00:00

## 2021-01-01 RX ADMIN — Medication 3 MILLIGRAM(S): at 22:02

## 2021-01-01 RX ADMIN — LIDOCAINE 1 PATCH: 4 CREAM TOPICAL at 12:19

## 2021-01-01 RX ADMIN — LIDOCAINE 1 PATCH: 4 CREAM TOPICAL at 12:17

## 2021-01-01 RX ADMIN — AMLODIPINE BESYLATE 10 MILLIGRAM(S): 2.5 TABLET ORAL at 06:21

## 2021-01-01 RX ADMIN — AMLODIPINE BESYLATE 10 MILLIGRAM(S): 2.5 TABLET ORAL at 05:48

## 2021-01-01 RX ADMIN — AMPICILLIN SODIUM AND SULBACTAM SODIUM 100 GRAM(S): 250; 125 INJECTION, POWDER, FOR SUSPENSION INTRAMUSCULAR; INTRAVENOUS at 17:58

## 2021-01-01 RX ADMIN — Medication 20 MILLIGRAM(S): at 18:14

## 2021-01-01 RX ADMIN — Medication 1 TABLET(S): at 11:17

## 2021-01-01 RX ADMIN — POLYETHYLENE GLYCOL 3350 17 GRAM(S): 17 POWDER, FOR SOLUTION ORAL at 11:47

## 2021-01-01 RX ADMIN — Medication 975 MILLIGRAM(S): at 11:06

## 2021-01-01 RX ADMIN — SENNA PLUS 2 TABLET(S): 8.6 TABLET ORAL at 22:57

## 2021-01-01 RX ADMIN — Medication 20 MILLIGRAM(S): at 05:02

## 2021-01-01 RX ADMIN — Medication 30 MILLILITER(S): at 10:07

## 2021-01-01 RX ADMIN — Medication 20 MILLIGRAM(S): at 05:33

## 2021-01-01 RX ADMIN — Medication 1 TABLET(S): at 11:43

## 2021-01-01 RX ADMIN — Medication 650 MILLIGRAM(S): at 04:26

## 2021-01-01 RX ADMIN — Medication 1 TABLET(S): at 12:52

## 2021-01-01 RX ADMIN — AMPICILLIN SODIUM AND SULBACTAM SODIUM 100 GRAM(S): 250; 125 INJECTION, POWDER, FOR SUSPENSION INTRAMUSCULAR; INTRAVENOUS at 21:02

## 2021-01-01 RX ADMIN — Medication 20 MILLIGRAM(S): at 06:08

## 2021-01-01 RX ADMIN — LIDOCAINE 1 PATCH: 4 CREAM TOPICAL at 23:29

## 2021-01-01 RX ADMIN — LIDOCAINE 1 PATCH: 4 CREAM TOPICAL at 13:11

## 2021-01-01 RX ADMIN — HEPARIN SODIUM 1400 UNIT(S)/HR: 5000 INJECTION INTRAVENOUS; SUBCUTANEOUS at 21:10

## 2021-01-01 RX ADMIN — AMLODIPINE BESYLATE 10 MILLIGRAM(S): 2.5 TABLET ORAL at 05:55

## 2021-01-01 RX ADMIN — AMPICILLIN SODIUM AND SULBACTAM SODIUM 100 GRAM(S): 250; 125 INJECTION, POWDER, FOR SUSPENSION INTRAMUSCULAR; INTRAVENOUS at 11:41

## 2021-01-01 RX ADMIN — LIDOCAINE 1 PATCH: 4 CREAM TOPICAL at 19:32

## 2021-01-01 RX ADMIN — Medication 40 MILLIGRAM(S): at 01:44

## 2021-01-01 RX ADMIN — BENZOCAINE AND MENTHOL 1 LOZENGE: 5; 1 LIQUID ORAL at 21:16

## 2021-01-01 RX ADMIN — Medication 975 MILLIGRAM(S): at 22:49

## 2021-01-01 RX ADMIN — Medication 81 MILLIGRAM(S): at 12:22

## 2021-01-01 RX ADMIN — AMPICILLIN SODIUM AND SULBACTAM SODIUM 100 GRAM(S): 250; 125 INJECTION, POWDER, FOR SUSPENSION INTRAMUSCULAR; INTRAVENOUS at 06:35

## 2021-01-01 RX ADMIN — LIDOCAINE 1 PATCH: 4 CREAM TOPICAL at 12:13

## 2021-01-01 RX ADMIN — BENZOCAINE AND MENTHOL 1 LOZENGE: 5; 1 LIQUID ORAL at 13:23

## 2021-01-01 RX ADMIN — Medication 81 MILLIGRAM(S): at 13:38

## 2021-01-01 RX ADMIN — LIDOCAINE 1 PATCH: 4 CREAM TOPICAL at 18:24

## 2021-01-01 RX ADMIN — Medication 81 MILLIGRAM(S): at 14:44

## 2021-01-01 RX ADMIN — SENNA PLUS 2 TABLET(S): 8.6 TABLET ORAL at 22:17

## 2021-01-01 RX ADMIN — Medication 30 MILLILITER(S): at 21:18

## 2021-01-01 RX ADMIN — LIDOCAINE 1 PATCH: 4 CREAM TOPICAL at 06:18

## 2021-01-01 RX ADMIN — AMLODIPINE BESYLATE 10 MILLIGRAM(S): 2.5 TABLET ORAL at 05:18

## 2021-01-01 RX ADMIN — Medication 20 MILLIGRAM(S): at 06:42

## 2021-01-01 RX ADMIN — LIDOCAINE 1 PATCH: 4 CREAM TOPICAL at 01:20

## 2021-01-01 RX ADMIN — LIDOCAINE 1 PATCH: 4 CREAM TOPICAL at 14:45

## 2021-01-01 RX ADMIN — AMPICILLIN SODIUM AND SULBACTAM SODIUM 100 GRAM(S): 250; 125 INJECTION, POWDER, FOR SUSPENSION INTRAMUSCULAR; INTRAVENOUS at 19:20

## 2021-01-01 RX ADMIN — LIDOCAINE 1 PATCH: 4 CREAM TOPICAL at 20:33

## 2021-01-01 RX ADMIN — POLYETHYLENE GLYCOL 3350 17 GRAM(S): 17 POWDER, FOR SOLUTION ORAL at 11:36

## 2021-01-01 RX ADMIN — LIDOCAINE 1 PATCH: 4 CREAM TOPICAL at 11:47

## 2021-01-01 RX ADMIN — SENNA PLUS 2 TABLET(S): 8.6 TABLET ORAL at 23:03

## 2021-01-01 RX ADMIN — AMLODIPINE BESYLATE 10 MILLIGRAM(S): 2.5 TABLET ORAL at 05:10

## 2021-01-01 RX ADMIN — Medication 3 MILLIGRAM(S): at 00:00

## 2021-01-01 RX ADMIN — AMPICILLIN SODIUM AND SULBACTAM SODIUM 100 GRAM(S): 250; 125 INJECTION, POWDER, FOR SUSPENSION INTRAMUSCULAR; INTRAVENOUS at 12:12

## 2021-01-01 RX ADMIN — LIDOCAINE 1 PATCH: 4 CREAM TOPICAL at 11:32

## 2021-01-01 RX ADMIN — Medication 20 MILLIGRAM(S): at 05:28

## 2021-01-01 RX ADMIN — LIDOCAINE 1 PATCH: 4 CREAM TOPICAL at 23:50

## 2021-01-01 RX ADMIN — LIDOCAINE 1 PATCH: 4 CREAM TOPICAL at 23:56

## 2021-01-01 RX ADMIN — SENNA PLUS 2 TABLET(S): 8.6 TABLET ORAL at 22:28

## 2021-01-01 RX ADMIN — LIDOCAINE 1 PATCH: 4 CREAM TOPICAL at 18:22

## 2021-01-01 RX ADMIN — Medication 650 MILLIGRAM(S): at 21:26

## 2021-01-01 RX ADMIN — AMPICILLIN SODIUM AND SULBACTAM SODIUM 100 GRAM(S): 250; 125 INJECTION, POWDER, FOR SUSPENSION INTRAMUSCULAR; INTRAVENOUS at 00:53

## 2021-01-01 RX ADMIN — AMPICILLIN SODIUM AND SULBACTAM SODIUM 100 GRAM(S): 250; 125 INJECTION, POWDER, FOR SUSPENSION INTRAMUSCULAR; INTRAVENOUS at 22:33

## 2021-01-01 RX ADMIN — Medication 3 MILLIGRAM(S): at 02:22

## 2021-01-01 RX ADMIN — Medication 30 MILLILITER(S): at 22:02

## 2021-01-01 RX ADMIN — Medication 975 MILLIGRAM(S): at 09:56

## 2021-01-01 RX ADMIN — LIDOCAINE 1 PATCH: 4 CREAM TOPICAL at 19:08

## 2021-01-01 RX ADMIN — Medication 30 MILLILITER(S): at 12:24

## 2021-01-01 RX ADMIN — Medication 20 MILLIGRAM(S): at 06:03

## 2021-01-01 RX ADMIN — Medication 650 MILLIGRAM(S): at 13:01

## 2021-01-01 RX ADMIN — AMPICILLIN SODIUM AND SULBACTAM SODIUM 100 GRAM(S): 250; 125 INJECTION, POWDER, FOR SUSPENSION INTRAMUSCULAR; INTRAVENOUS at 05:55

## 2021-01-01 RX ADMIN — AMLODIPINE BESYLATE 10 MILLIGRAM(S): 2.5 TABLET ORAL at 06:27

## 2021-01-01 RX ADMIN — SENNA PLUS 2 TABLET(S): 8.6 TABLET ORAL at 21:27

## 2021-01-01 RX ADMIN — BENZOCAINE AND MENTHOL 1 LOZENGE: 5; 1 LIQUID ORAL at 13:01

## 2021-01-01 RX ADMIN — AMLODIPINE BESYLATE 10 MILLIGRAM(S): 2.5 TABLET ORAL at 06:48

## 2021-07-16 NOTE — H&P ADULT - ASSESSMENT
HPI: A 89F PMH of HTN, who presents to ED for repeated falls over the past week. HPI: A 89F PMH of HTN, who presents to ED for repeated falls over the past week found to have 5th/6th rib fractures, incidental pericardial effusion, and cellulitis- admitted for pain control and IV Antibiotics.

## 2021-07-16 NOTE — ED PROVIDER NOTE - NS ED ROS FT
Constitutional:  (-) fever, (-) chills, (-) lethargy  Eyes:  (-) eye pain (-) visual changes  ENMT: (-) nasal discharge, (-) sore throat. (-) neck pain or stiffness  Cardiac: (+) chest pain (-) palpitations  Respiratory:  (-) cough (-) respiratory distress.   GI:  (-) nausea (-) vomiting (-) diarrhea (-) abdominal pain.  :  (-) dysuria (-) frequency (-) burning.  MS:  (-) back pain (-) joint pain.  Neuro:  (-) headache (-) numbness (-) tingling (-) focal weakness  Skin:  (+) rash  Except as documented in the HPI,  all other systems are negative

## 2021-07-16 NOTE — H&P ADULT - NSHPSOCIALHISTORY_GEN_ALL_CORE
Lives at home with her son Austyn.   HHA 4 hours a day/ 7 days week.   Ambulates with a cane, mostly independent in ADLs.   Denies any smoking, tobacco use, or illicit drug use.

## 2021-07-16 NOTE — H&P ADULT - PROBLEM SELECTOR PLAN 1
CT Head/CT Cervical Spine with no acute pathology.   X-rays Ankle, Knee, Pelvis, Tib/Fib negative for acute pathology.   Pain control, Tylenol 650mg q6h ATC x24 hours. Hot/Cold packs.   Incentive Spirometry.  Fall Risk Protocol, PT evaluation. In the setting of bilateral LE swelling, chronic venous stasis dermatitis.   R >L, increased redness and warmth of right LE.   s/p Ceftriaxone, continue IV Unasyn for now (no MRSA risk factors).   Follow up blood cultures, deescalate antibiotics per cultures.   Follow up Doppler LE to rule out acute DVT. In the setting of bilateral LE swelling, chronic venous stasis dermatitis.   R >L, increased redness and warmth of right LE.   s/p Ceftriaxone, continue IV Unasyn for now (no MRSA risk factors).   Follow up blood cultures, deescalate antibiotics per cultures.   Follow up Doppler LE to rule out acute DVT.  Wound care per nursing. Determine need for Wound Care RN.

## 2021-07-16 NOTE — H&P ADULT - PROBLEM SELECTOR PLAN 4
Continue to monitor BP trend. CT Chest with large pericardial effusion. Asymptomatic, vitals stable.   Follow up Echocardiogram.   Telemetry monitoring for now.   Cardiology consult.

## 2021-07-16 NOTE — H&P ADULT - PROBLEM SELECTOR PLAN 6
DVT ppx: Lovenox SQ   Activity/Diet as tolerated Determine need for SLP assessment for swallowing.    Dispo: Pending hospital course, PT eval, SW eval.  GOC: DNR/DNI, MOLST form completed, decision maker: patient. Patient unable to recall medications, but confirms Amlodipine and ASA 81.  Attempted to contact daughter Lyric (who has a screen shot of her meds).   Need to follow up AM for exact medication list.   ?Cymbalta, Cozaar.

## 2021-07-16 NOTE — ED ADULT NURSE NOTE - NSFALLRSKASSESSTYPE_ED_ALL_ED
Called and advised patient to call office to set up apt for a cardiologists to remove loop Initial (On Arrival)

## 2021-07-16 NOTE — ED ADULT NURSE NOTE - OBJECTIVE STATEMENT
Pt awake, alert and oriented x 4 presents from home s/p fall this AM and fall on Monday night.   Pt c/o right rib pain.    B/L lower extremity swelling and redness to legs.   Respirations even and unlabored.   No bruising or deformities noted.   Full ROM of all 4 extremities.  IV placed and blood work sent.   site unremarkable.    Pt taken to CT head.   Awaiting meds on return to ER.    Daughter at bedside.  No skin breakdown noted.

## 2021-07-16 NOTE — ED PROVIDER NOTE - OBJECTIVE STATEMENT
89F pmh htn presents to ED for repeated falls over the past week, last fall last night, pt states she lost her footing and landed on the couch, was able to right herself up but was unable to get out of bed, pt has been feeling weak and having leg weakness x weeks, pt also endorses L ankle pain since the fall last night, as per daughter in room pt had wound which was being treated on R leg but unsure of recent swelling. 89F pmh htn presents to ED for repeated falls over the past week, last fall last night, pt states she lost her footing and landed on the couch, was able to right herself up but was unable to get out of bed, pt has been feeling weak and having leg weakness x weeks, pt also endorses L ankle pain since the fall last night, as per daughter in room pt had wound which was being treated on R leg but unsure of recent swelling. Pt denies urinary frequency, denies fevers/chills, nausea/vomiting. As per daughter, pt frequently crawls up stairs @ home.

## 2021-07-16 NOTE — GOALS OF CARE CONVERSATION - ADVANCED CARE PLANNING - CONVERSATION DETAILS
Discussed MOLST with patient, Patient is currently capacitated to make informed medical decisions for herself. Patient states that "at my age" she would want to "pass away naturally" and does "not want to be connected to machines/tubes to keep her alive." Patient agrees to proceed to complete the MOLST depicting her wishes for DNR/DNI. Addressed all questions/concerns. Emotional support provided.

## 2021-07-16 NOTE — H&P ADULT - NSICDXPASTSURGICALHX_GEN_ALL_CORE_FT
PAST SURGICAL HISTORY:  Abnormality of femur     Squamous Cell Carcinoma of Skin of Face removed from R cheek 4 years ago

## 2021-07-16 NOTE — H&P ADULT - PROBLEM SELECTOR PLAN 7
DVT ppx: Lovenox SQ   Activity/Diet advance as tolerated. Pt reports regular diet, started mechanical soft for now.   Dispo: Pending hospital course, PT ARABELLA watson/LILI for safe dc planning.  GOC: DNR/DNI, MOLST form completed, decision maker: patient.

## 2021-07-16 NOTE — ED PROVIDER NOTE - PHYSICAL EXAMINATION
CONSTITUTIONAL: NAD  SKIN: R lower extremity edematous, indurated w/ warmth, pitting edema  HEAD: NCAT  EYES: EOMI, PERRLA, no scleral icterus, conjunctiva pink  ENT: normal pharynx with no erythema or exudates  NECK: Supple; non tender. Full ROM.  CARD: RRR, no murmurs.  RESP: clear to ausculation b/l. No crackles or wheezing.  ABD: soft, non-tender, non-distended, no rebound or guarding.  EXT: LLE pitting edema, L ankle tenderness to palpation, pt str limited 2/2 leg swelling, RLE skin findings above, RLE no bony tenderness, no calf tenderness  PSYCH: Cooperative, appropriate.

## 2021-07-16 NOTE — ED PROVIDER NOTE - CARE PLAN
Principal Discharge DX:	Cellulitis of leg, right  Secondary Diagnosis:	Frequent falls  Secondary Diagnosis:	Rib fractures   Principal Discharge DX:	Pericardial effusion  Secondary Diagnosis:	Frequent falls  Secondary Diagnosis:	Rib fractures

## 2021-07-16 NOTE — H&P ADULT - PROBLEM SELECTOR PLAN 2
Chest X-ray: Rt sided fractures, CT Chest: Acute 5th and 6th Rib fractures.  Pain control, Tylenol 650mg q6h ATC x24 hours. Hot/Cold packs.   Incentive Spirometry. CT Head/CT Cervical Spine with no acute pathology.   X-rays Ankle, Knee, Pelvis, Tib/Fib negative for acute pathology.   Pain control, Tylenol 650mg q6h ATC x24 hours. Hot/Cold packs.   Incentive Spirometry.  Fall Risk Protocol, PT evaluation. CT Head/CT Cervical Spine with no acute pathology.   X-rays Ankle, Knee, Pelvis, Tib/Fib negative for acute pathology.   Pain control, Tylenol 650mg q6h ATC x24 hours. Hot/Cold packs. Lidocaine patch.   Incentive Spirometry.  Fall Risk Protocol, PT evaluation.  B12, Folate, TSH.

## 2021-07-16 NOTE — H&P ADULT - NSHPREVIEWOFSYSTEMS_GEN_ALL_CORE
Review of Systems:   CONSTITUTIONAL: No fever, weight loss, or fatigue  EYES: No eye pain, visual disturbances, or discharge  ENMT:  No difficulty hearing, tinnitus, vertigo; No sinus or throat pain  NECK: No pain or stiffness  RESPIRATORY: No cough, wheezing, chills or hemoptysis; No shortness of breath  CARDIOVASCULAR: No chest pain, palpitations, dizziness, or leg swelling  GASTROINTESTINAL: No abdominal or epigastric pain. No nausea, vomiting, or hematemesis; No diarrhea or constipation. No melena or hematochezia.  GENITOURINARY: No dysuria, frequency, hematuria, or incontinence  NEUROLOGICAL: No headaches, memory loss, loss of strength, numbness, or tremors  SKIN: No itching, burning, rashes, or lesions   LYMPH NODES: No enlarged glands  ENDOCRINE: No heat or cold intolerance; No hair loss  MUSCULOSKELETAL: No joint pain or swelling; No muscle, back, or extremity pain  HEME/LYMPH: No easy bruising, or bleeding gums  ALLERY AND IMMUNOLOGIC: No hives or eczema Review of Systems:   CONSTITUTIONAL: No fever, weight loss, or fatigue  EYES: No eye pain, visual disturbances, or discharge  ENMT:  No difficulty hearing, tinnitus, vertigo; No sinus or throat pain  NECK: No pain or stiffness  RESPIRATORY: No cough, wheezing, chills or hemoptysis; No shortness of breath  CARDIOVASCULAR: No chest pain, palpitations, dizziness, or leg swelling  GASTROINTESTINAL: No abdominal or epigastric pain. No nausea, vomiting, or hematemesis; No diarrhea or constipation. No melena or hematochezia.  GENITOURINARY: No dysuria, frequency, hematuria, or incontinence  NEUROLOGICAL: No headaches, memory loss, loss of strength, numbness, or tremors  SKIN: No itching, burning, rashes, or lesions   LYMPH NODES: No enlarged glands  ENDOCRINE: No heat or cold intolerance; No hair loss  MUSCULOSKELETAL: LE tenderness, R>L.   HEME/LYMPH: No easy bruising, or bleeding gums  ALLERY AND IMMUNOLOGIC: No hives or eczema

## 2021-07-16 NOTE — ED ADULT NURSE REASSESSMENT NOTE - NS ED NURSE REASSESS COMMENT FT1
Pt awake, alert and oriented x 4.   Denies chest pain, SOB, c/o some mild rib/side pain.   tylenol given.   No acute distress or discomfort.   IV site unremarkable.    Pt eating against medical advice to wait for radiology results.   awaiting further plan of care.

## 2021-07-16 NOTE — ED PROVIDER NOTE - ATTENDING CONTRIBUTION TO CARE
DR. RESTREPO, ATTENDING MD-  I performed a face to face bedside interview with the patient regarding history of present illness, review of symptoms and past medical history. I completed an independent physical exam.  I have discussed the patient's plan of care with the resident.   Documentation as above in the note.    89 y/o female h/o htn with mult falls over past few days, c/o right chest wall pain, generalized fatigue, redness to rle.  Likely cellulitis, eval for traumatic injury, give pain med, abx, admit for further care and evaluation.

## 2021-07-16 NOTE — ED ADULT NURSE NOTE - NSIMPLEMENTINTERV_GEN_ALL_ED
Implemented All Fall with Harm Risk Interventions:  [place] to call system. Call bell, personal items and telephone within reach. Instruct patient to call for assistance. Room bathroom lighting operational. Non-slip footwear when patient is off stretcher. Physically safe environment: no spills, clutter or unnecessary equipment. Stretcher in lowest position, wheels locked, appropriate side rails in place. Provide visual cue, wrist band, yellow gown, etc. Monitor gait and stability. Monitor for mental status changes and reorient to person, place, and time. Review medications for side effects contributing to fall risk. Reinforce activity limits and safety measures with patient and family. Provide visual clues: red socks.

## 2021-07-16 NOTE — ED PROVIDER NOTE - CLINICAL SUMMARY MEDICAL DECISION MAKING FREE TEXT BOX
Multiple falls, r/o intracranial bleed and cspine injury, concern for leg cellulitis causing weakness/sepsis, r/o pna, UTI, ct chest to rule out multiple rib fractures, abx for leg cellulitis, pain control, pt TBA

## 2021-07-16 NOTE — ED ADULT TRIAGE NOTE - CHIEF COMPLAINT QUOTE
daughter states pt  fell monday and again last pm.  states missed footing while reaching for chair, c/o pain l leg. swelling to legs noted.. states unsure reason fall on monday. daughter  states  family lives with handicapped son and requesting help at home daughter states pt  fell monday and again last pm.  states missed footing while reaching for chair, c/o pain l leg. swelling to legs noted.. states unsure reason fall on monday. daughter  states  family lives with handicapped son and requesting help at home  fs [id number]

## 2021-07-16 NOTE — H&P ADULT - PROBLEM SELECTOR PLAN 3
CT Chest with large pericardial effusion. Asymptomatic, vitals stable.   Follow up Echocardiogram.   Telemetry monitoring for now.   Cardiology consult. Chest X-ray: Rt sided fractures, CT Chest: Acute 5th and 6th Rib fractures.  Pain control, Tylenol 650mg q6h ATC x24 hours. Hot/Cold packs.   Incentive Spirometry.

## 2021-07-16 NOTE — H&P ADULT - NSHPPHYSICALEXAM_GEN_ALL_CORE
Vital Signs Last 24 Hrs  T(C): 36.4 (16 Jul 2021 16:32), Max: 36.7 (16 Jul 2021 09:02)  T(F): 97.6 (16 Jul 2021 16:32), Max: 98 (16 Jul 2021 09:02)  HR: 74 (16 Jul 2021 16:32) (74 - 85)  BP: 145/95 (16 Jul 2021 16:32) (143/78 - 151/70)  BP(mean): --  RR: 18 (16 Jul 2021 16:32) (18 - 18)  SpO2: 94% (16 Jul 2021 16:32) (94% - 98%)  CAPILLARY BLOOD GLUCOSE    POCT Blood Glucose.: 107 mg/dL (16 Jul 2021 09:12)    PHYSICAL EXAM:  GENERAL: NAD, well-developed  HEAD:  Atraumatic, Normocephalic  EYES: EOMI, conjunctiva and sclera clear  NECK: Supple, No JVD  CHEST/LUNG: Clear to auscultation bilaterally; No wheeze  HEART: Regular rate and rhythm; No murmurs, rubs, or gallops  ABDOMEN: Soft, Nontender, Nondistended; Bowel sounds present  EXTREMITIES:  2+ Peripheral Pulses, No clubbing, cyanosis, or edema  NEUROLOGY: non-focal  SKIN: No rashes or lesions Vital Signs Last 24 Hrs  T(C): 36.4 (16 Jul 2021 16:32), Max: 36.7 (16 Jul 2021 09:02)  T(F): 97.6 (16 Jul 2021 16:32), Max: 98 (16 Jul 2021 09:02)  HR: 74 (16 Jul 2021 16:32) (74 - 85)  BP: 145/95 (16 Jul 2021 16:32) (143/78 - 151/70)  BP(mean): --  RR: 18 (16 Jul 2021 16:32) (18 - 18)  SpO2: 94% (16 Jul 2021 16:32) (94% - 98%)  CAPILLARY BLOOD GLUCOSE    POCT Blood Glucose.: 107 mg/dL (16 Jul 2021 09:12)    PHYSICAL EXAM:  GENERAL: NAD, elderly, frail, comfortable appearing.   HEAD:  Atraumatic, Normocephalic  EYES: EOMI, conjunctiva and sclera clear  NECK: Supple, No JVD  CHEST/LUNG: Clear to auscultation bilaterally; No wheeze  HEART: Regular rate and rhythm; No murmurs, rubs, or gallops  ABDOMEN: Soft, Nontender, Nondistended; Bowel sounds present  EXTREMITIES:  2+ Peripheral Pulses, No clubbing, cyanosis, or edema  NEUROLOGY: non-focal, alert and oriented x4, capacitated.   SKIN: No rashes or lesions

## 2021-07-16 NOTE — H&P ADULT - NSHPLABSRESULTS_GEN_ALL_CORE
LABS: reviewed                        11.7   5.18  )-----------( 184      ( 2021 11:29 )             37.0     07-16    141  |  103  |  24<H>  ----------------------------<  117<H>  3.7   |  24  |  1.05    Ca    9.4      2021 11:29    TPro  6.8  /  Alb  3.7  /  TBili  0.5  /  DBili  x   /  AST  15  /  ALT  13  /  AlkPhos  79  07-16    CARDIAC MARKERS ( 2021 11:29 )  x     / x     / 40 U/L / x     / x        Urinalysis Basic - ( 2021 15:11 )    Color: Yellow / Appearance: Clear / S.026 / pH: x  Gluc: x / Ketone: Negative  / Bili: Negative / Urobili: 3 mg/dL   Blood: x / Protein: 30 mg/dL / Nitrite: Negative   Leuk Esterase: Negative / RBC: 3-5 /HPF / WBC 2 /HPF   Sq Epi: x / Non Sq Epi: 1 /HPF / Bacteria: Few    < from: CT Head No Cont (21 @ 11:42) >  EXAM:  CT BRAIN    PROCEDURE DATE:  2021   INTERPRETATION:  Clinical indication: Trauma. Falls been  Multiple axial sections were performed from base of skull to vertex without contrast enhancement. 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  There is evidence of abnormal low-attenuation seen involving the left basal ganglia perisylvian region. This best seen on series 5 image 23. The possibility of acute infarct in this region cannot entirely excluded. MRI can be done to better characterize this finding.  No significant shift or herniation is seen  Evaluation of the osseous structures with the appropriate window appears normal  The visualized paranasal sinuses mastoid and middle ear regions appear clear.  IMPRESSION: Abnormal low-attenuation involving the left basal ganglia/perisylvian region as described above.    < from: CT Cervical Spine No Cont (21 @ 11:42) >  IMPRESSION: Degenerative changes involving the cervical spine is seen. No acute fractures or dislocations are seen  Nonspecific lymphadenopathy is seen involving the soft tissue neck region.    x< from: Xray Pelvis AP only (21 @ 15:44) >  INTERPRETATION:  No acute fracture or dislocation. Right hip orthopedic hardware without fracture or loosening.    < from: Xray Ankle Complete 3 Views, Left (21 @ 15:43) >  INTERPRETATION:  No fracture or dislocation.    < from: Xray Tibia + Fibula 2 Views, Left (21 @ 15:42) >  INTERPRETATION:  no fracture or dislocation.    < from: Xray Knee 3 Views, Left (21 @ 15:42) >  INTERPRETATION:  Moderate to severe left knee tricompartmental degenerative changes including marginal osteophytes, joint space narrowing and subchondral sclerosis.  No fracture.    < from: Xray Chest 1 View- PORTABLE-Urgent (21 @ 15:41) >  FINDINGS:  Heart is enlarged.  No focal consolidations. No large pleural effusion. No pneumothorax.  Degenerative changes of the thoracic spine.  Acute, nondisplaced right 5th and 6th rib fractures.  IMPRESSION:  Cardiomegaly.  No focal consolidations.  Right-sided rib fractures. No Yes

## 2021-07-16 NOTE — ED ADULT NURSE NOTE - CHIEF COMPLAINT QUOTE
daughter states pt  fell monday and again last pm.  states missed footing while reaching for chair, c/o pain l leg. swelling to legs noted.. states unsure reason fall on monday. daughter  states  family lives with handicapped son and requesting help at home  fs [phone]

## 2021-07-17 NOTE — CHART NOTE - NSCHARTNOTEFT_GEN_A_CORE
Notified by RN patient complaining of SOB and desaturated down to 80's on RA. Patient placed on 3L NC and now sating well >95%. Assessed patient at bedside, in no acute distress, at baseline mental status, with no complaints of SOB or chest pain. Patient not tachypneic. Patient states she just wants to go to sleep.   Chest X-ray ordered STAT. Will continue on 3L NC and will attempt to wean in am.     Vital Signs Last 24 Hrs  T(C): 36.4 (16 Jul 2021 21:45), Max: 36.7 (16 Jul 2021 09:02)  T(F): 97.5 (16 Jul 2021 21:45), Max: 98 (16 Jul 2021 09:02)  HR: 80 (16 Jul 2021 21:45) (74 - 85)  BP: 165/83 (16 Jul 2021 21:45) (143/78 - 165/83)  BP(mean): --  RR: 20 (16 Jul 2021 21:45) (18 - 20)  SpO2: 94% (16 Jul 2021 21:45) (94% - 98%)    NANDO May  Department of Medicine   In House # [id number] Notified by RN patient complaining of SOB and desaturated down to 80's on RA. Patient placed on 3L NC and now sating well >95%. Assessed patient at bedside, in no acute distress, at baseline mental status, with no complaints of SOB or chest pain. Patient not tachypneic. Patient states she just wants to go to sleep.   Chest X-ray ordered STAT. Will continue on 3L NC and will attempt to wean in am.     Vital Signs Last 24 Hrs  T(C): 36.4 (16 Jul 2021 21:45), Max: 36.7 (16 Jul 2021 09:02)  T(F): 97.5 (16 Jul 2021 21:45), Max: 98 (16 Jul 2021 09:02)  HR: 80 (16 Jul 2021 21:45) (74 - 85)  BP: 165/83 (16 Jul 2021 21:45) (143/78 - 165/83)  BP(mean): --  RR: 20 (16 Jul 2021 21:45) (18 - 20)  SpO2: 94% (16 Jul 2021 21:45) (94% - 98%)    PHYSICAL EXAM:  GENERAL: No acute distress, well-developed  HEAD:  Atraumatic, Normocephalic  CHEST/LUNG: + rhonchi L Lung base   HEART: Regular rate and rhythm; No murmurs, rubs, or gallops  ABDOMEN: Soft, non-tender, non-distended; normal bowel sounds, no organomegaly  EXTREMITIES:  2+ peripheral pulses b/l, No clubbing, cyanosis, or edema    NANDO May  Department of Medicine   In House # [id number] Notified by RN patient complaining of SOB and desaturated down to 80's on RA. Patient placed on 3L NC and now sating well >95%. Assessed patient at bedside, in no acute distress, hemodynamically stable, at baseline mental status, with no complaints of SOB or chest pain. Patient not tachypneic or tachycardic at this time. No evidence of JVD on exam. Low suspicion of cardiac tamponade as patient clinically well. Patient states she just "wants to go to sleep". If patient continues to increase O2 demands will consider ddimer to r/o PE.    Chest X-ray ordered STAT. VBG added on to am labs. Will continue on 3L NC and will attempt to wean in am.     Vital Signs Last 24 Hrs  T(C): 36.4 (16 Jul 2021 21:45), Max: 36.7 (16 Jul 2021 09:02)  T(F): 97.5 (16 Jul 2021 21:45), Max: 98 (16 Jul 2021 09:02)  HR: 80 (16 Jul 2021 21:45) (74 - 85)  BP: 165/83 (16 Jul 2021 21:45) (143/78 - 165/83)  BP(mean): --  RR: 20 (16 Jul 2021 21:45) (18 - 20)  SpO2: 94% (16 Jul 2021 21:45) (94% - 98%)    PHYSICAL EXAM:  GENERAL: No acute distress, well-developed  HEAD:  Atraumatic, Normocephalic  NECK: no JVD   CHEST/LUNG: + rhonchi L Lung base   HEART: Regular rate and rhythm; No murmurs, rubs, or gallops  ABDOMEN: Soft, non-tender, non-distended; normal bowel sounds, no organomegaly  EXTREMITIES:  2+ peripheral pulses b/l, No clubbing, cyanosis, or edema    NANDO May  Department of Medicine   In House # [id number]

## 2021-07-17 NOTE — PROGRESS NOTE ADULT - PROBLEM SELECTOR PLAN 8
Recommendation Preamble: The following recommendations were made during the visit: shorter showers ,cetaphil lotion, aveeno for dry skin. Detail Level: Zone DVT ppx: Lovenox SQ   Activity/Diet advance as tolerated. Pt reports regular diet, started mechanical soft for now.   Dispo: Pending hospital course, PT ARABELLA watson/LILI for safe dc planning.  GOC: DNR/DNI, MOLST form completed, decision maker: patient.

## 2021-07-17 NOTE — PHYSICAL THERAPY INITIAL EVALUATION ADULT - PERTINENT HX OF CURRENT PROBLEM, REHAB EVAL
Patient is a 89 year old female admitted to [facility] s/p repeated falls over the past week. PMH of HTN, HLD, Chronic Venous Stasis Dermatitis, Pericardial Effusion.

## 2021-07-17 NOTE — PROGRESS NOTE ADULT - PROBLEM SELECTOR PLAN 7
Patient unable to recall medications, but confirms Amlodipine and ASA 81.  Attempted to contact daughter Lyric (who has a screen shot of her meds).   Need to follow up AM for exact medication list.   ?Cymbalta, Cozaar.

## 2021-07-17 NOTE — CONSULT NOTE ADULT - SUBJECTIVE AND OBJECTIVE BOX
Patient seen and evaluated at bedside    Chief Complaint: fall    HPI:  HPI: History obtained directly from patient who is alert/oriented x4. A 89F PMH of HTN, HLD, Chronic Venous Stasis Dermatitis, Pericardial Effusion who presents to ED for repeated falls over the past week, last fall last night, pt states she lost her footing and landed on the couch, was able to right herself up but was unable to get out of bed, pt has been feeling weak and having leg weakness x weeks. Pt also endorses L ankle pain since the fall last night, as per daughter in room pt had wound which was being treated on R leg but unsure of recent swelling. Pt denies any recent/current symptoms of shortness of breath, chest pain, abdominal pain, nausea, vomiting, dizziness, paplitations, urinary frequency, denies fevers/chills, nausea/vomiting. Patient states that she is independent in most activities of daily living and ambulates with a cane and at times "crawls" up the stairs to get to the 2nd level. Receives HHA 4 hours a day , 7 days week.    (16 Jul 2021 18:15)    CT chest shows large pericardial effusion. The patient states that she's had this effusion in the past / has known about it for 2 years now, and she's had it drained in the past. She did change her code status to DNR/DNI, however does want her effusion to be drained. Currently hemodynamically stable with /88.     PMHx:   HTN (Hypertension)    PSHx:   Squamous Cell Carcinoma of Skin of Face    Abnormality of femur        Allergies:  No Known Allergies      Home Meds:    Current Medications:   acetaminophen   Tablet .. 975 milliGRAM(s) Oral every 8 hours PRN  albuterol/ipratropium for Nebulization. 3 milliLiter(s) Nebulizer once  aluminum hydroxide/magnesium hydroxide/simethicone Suspension 30 milliLiter(s) Oral every 4 hours PRN  amLODIPine   Tablet 10 milliGRAM(s) Oral daily  ampicillin/sulbactam  IVPB      ampicillin/sulbactam  IVPB 1.5 Gram(s) IV Intermittent every 6 hours  aspirin enteric coated 81 milliGRAM(s) Oral daily  enoxaparin Injectable 40 milliGRAM(s) SubCutaneous daily  lidocaine   Patch 1 Patch Transdermal daily  melatonin 3 milliGRAM(s) Oral at bedtime PRN      FAMILY HISTORY:  No pertinent family history in first degree relatives    Social History: has daughter  Smoking History: remote history, when she was in her 30s  Alcohol Use:  Drug Use:    REVIEW OF SYSTEMS:  CONSTITUTIONAL: +weakness, no fevers or chills  EYES/ENT: No visual changes;  No dysphagia  NECK: No pain or stiffness  RESPIRATORY: No cough, wheezing, hemoptysis; No shortness of breath  CARDIOVASCULAR: No chest pain or palpitations; No lower extremity edema  GASTROINTESTINAL: No abdominal or epigastric pain. No nausea, vomiting, or hematemesis; No diarrhea or constipation. No melena or hematochezia.  BACK: No back pain  GENITOURINARY: No dysuria, frequency or hematuria  NEUROLOGICAL: No numbness or weakness  SKIN: No itching, burning, rashes, or lesions   All other review of systems is negative unless indicated above.    Physical Exam:  T(F): 97.3 (07-17), Max: 98.1 (07-17)  HR: 81 (07-17) (74 - 88)  BP: 121/62 (07-17) (121/62 - 169/90)  RR: 20 (07-17)  SpO2: 99% (07-17)  GENERAL: No acute distress, well-developed  HEAD:  Atraumatic, Normocephalic  ENT: EOMI, PERRLA, conjunctiva and sclera clear, Neck supple, No JVD, moist mucosa  CHEST/LUNG: Clear to auscultation bilaterally; No wheeze, equal breath sounds bilaterally   BACK: No spinal tenderness  HEART: Regular rate and rhythm; No murmurs, rubs, or gallops  ABDOMEN: Soft, Nontender, Nondistended; Bowel sounds present  EXTREMITIES:  No clubbing, cyanosis, or edema  PSYCH: Nl behavior, nl affect  NEUROLOGY: AAOx3, non-focal, cranial nerves intact  SKIN: Normal color, No rashes or lesions  LINES:    Cardiovascular Diagnostic Testing:    ECG: Personally reviewed: NSR, low voltage in limb leads but not precordial leads    Echo: Personally reviewed: PENDING    Stress Testing:    Cath:    Imaging:    CXR: Personally reviewed    Labs: Personally reviewed                        11.7   5.18  )-----------( 184      ( 16 Jul 2021 11:29 )             37.0     07-16    141  |  103  |  24<H>  ----------------------------<  117<H>  3.7   |  24  |  1.05    Ca    9.4      16 Jul 2021 11:29    TPro  6.8  /  Alb  3.7  /  TBili  0.5  /  DBili  x   /  AST  15  /  ALT  13  /  AlkPhos  79  07-16    CARDIAC MARKERS ( 16 Jul 2021 18:24 )  31 ng/L / x     / x     / x     / x     / x      CARDIAC MARKERS ( 16 Jul 2021 11:29 )  34 ng/L / x     / x     / 40 U/L / x     / x        Serum Pro-Brain Natriuretic Peptide: 951 pg/mL (07-16 @ 11:29)    Thyroid Stimulating Hormone, Serum: 2.05 uIU/mL (07-17 @ 07:10)

## 2021-07-17 NOTE — PHYSICAL THERAPY INITIAL EVALUATION ADULT - ADDITIONAL COMMENTS
Patient reports she lives with her son in a private house. Pt has a home health aide 4 hours/day,7  days/week. Pt ambulated with a cane prior to admission.    Patient was left safely in bed, all lines/tubes intact and call bell within reach, RN aware

## 2021-07-17 NOTE — PROGRESS NOTE ADULT - PROBLEM SELECTOR PLAN 1
In the setting of bilateral LE swelling, chronic venous stasis dermatitis.   R >L, increased redness and warmth of right LE.   s/p Ceftriaxone, continue IV Unasyn for now (no MRSA risk factors).   Follow up blood cultures, deescalate antibiotics per cultures.   Follow up Doppler LE to rule out acute DVT.  Wound care per nursing. Determine need for Wound Care RN.

## 2021-07-17 NOTE — PROGRESS NOTE ADULT - ASSESSMENT
HPI: A 89F PMH of HTN, who presents to ED for repeated falls over the past week found to have 5th/6th rib fractures, incidental pericardial effusion, and cellulitis- admitted for pain control and IV Antibiotics.

## 2021-07-17 NOTE — PROGRESS NOTE ADULT - PROBLEM SELECTOR PLAN 5
CT Chest with large pericardial effusion. Asymptomatic, vitals stable.   Follow up Echocardiogram.   Telemetry monitoring for now.   Cardiology consult called

## 2021-07-17 NOTE — PROGRESS NOTE ADULT - PROBLEM SELECTOR PLAN 3
CT Head/CT Cervical Spine with no acute pathology.   X-rays Ankle, Knee, Pelvis, Tib/Fib negative for acute pathology.   Pain control, Tylenol 650mg q6h ATC x24 hours. Hot/Cold packs. Lidocaine patch.   Incentive Spirometry.  Fall Risk Protocol, PT evaluation.  B12, Folate, TSH.

## 2021-07-17 NOTE — PROGRESS NOTE ADULT - SUBJECTIVE AND OBJECTIVE BOX
Patient is a 89y old  Female who presents with a chief complaint of Frequent Falls, concern for Cellulitis. (2021 18:15)      SUBJECTIVE / OVERNIGHT EVENTS: Pt noted to be hypoxic overnight. and placed on 3L NC, CXR done- unchanged per my review. Pt this morning complaining of shortness of breath and non productive cough, no chest pain     ADDITIONAL REVIEW OF SYSTEMS:    MEDICATIONS  (STANDING):  albuterol/ipratropium for Nebulization. 3 milliLiter(s) Nebulizer once  amLODIPine   Tablet 10 milliGRAM(s) Oral daily  ampicillin/sulbactam  IVPB      ampicillin/sulbactam  IVPB 1.5 Gram(s) IV Intermittent every 6 hours  aspirin enteric coated 81 milliGRAM(s) Oral daily  enoxaparin Injectable 40 milliGRAM(s) SubCutaneous daily  lidocaine   Patch 1 Patch Transdermal daily    MEDICATIONS  (PRN):  acetaminophen   Tablet .. 975 milliGRAM(s) Oral every 8 hours PRN Temp greater or equal to 38.5C (101.3F), Mild Pain (1 - 3)  aluminum hydroxide/magnesium hydroxide/simethicone Suspension 30 milliLiter(s) Oral every 4 hours PRN Dyspepsia  melatonin 3 milliGRAM(s) Oral at bedtime PRN Insomnia      CAPILLARY BLOOD GLUCOSE        I&O's Summary      PHYSICAL EXAM:  Vital Signs Last 24 Hrs  T(C): 36.3 (2021 12:16), Max: 36.7 (2021 05:45)  T(F): 97.3 (2021 12:16), Max: 98.1 (2021 05:45)  HR: 81 (2021 12:16) (74 - 88)  BP: 121/62 (2021 12:16) (121/62 - 169/90)  BP(mean): --  RR: 20 (2021 12:16) (18 - 30)  SpO2: 99% (2021 12:16) (92% - 100%)  GENERAL: NAD, elderly, frail, comfortable appearing.   HEAD:  Atraumatic, Normocephalic  EYES: EOMI, conjunctiva and sclera clear  NECK: Supple, No JVD  CHEST/LUNG: Clear to auscultation bilaterally; No wheeze  HEART: Regular rate and rhythm; No murmurs, rubs, or gallops  ABDOMEN: Soft, Nontender, Nondistended; Bowel sounds present  EXTREMITIES:  2+ Peripheral Pulses, No clubbing, cyanosis, or edema  NEUROLOGY: non-focal, alert and oriented x4, capacitated.   SKIN: No rashes or lesions    LABS:                        11.7   5.18  )-----------( 184      ( 2021 11:29 )             37.0     07-16    141  |  103  |  24<H>  ----------------------------<  117<H>  3.7   |  24  |  1.05    Ca    9.4      2021 11:29    TPro  6.8  /  Alb  3.7  /  TBili  0.5  /  DBili  x   /  AST  15  /  ALT  13  /  AlkPhos  79  07-16      CARDIAC MARKERS ( 2021 11:29 )  x     / x     / 40 U/L / x     / x          Urinalysis Basic - ( 2021 15:11 )    Color: Yellow / Appearance: Clear / S.026 / pH: x  Gluc: x / Ketone: Negative  / Bili: Negative / Urobili: 3 mg/dL   Blood: x / Protein: 30 mg/dL / Nitrite: Negative   Leuk Esterase: Negative / RBC: 3-5 /HPF / WBC 2 /HPF   Sq Epi: x / Non Sq Epi: 1 /HPF / Bacteria: Few          RADIOLOGY & ADDITIONAL TESTS:  Results Reviewed:   Imaging Personally Reviewed:  Electrocardiogram Personally Reviewed:    COORDINATION OF CARE:  Care Discussed with Consultants/Other Providers [Y/N]:  Prior or Outpatient Records Reviewed [Y/N]:

## 2021-07-17 NOTE — PHYSICAL THERAPY INITIAL EVALUATION ADULT - PATIENT PROFILE REVIEW, REHAB EVAL
PT orders received: ambulate as tolerated. Consult with RN Lupe GILLIAM, pt may participate in PT evaluation./yes

## 2021-07-17 NOTE — PROGRESS NOTE ADULT - PROBLEM SELECTOR PLAN 4
Chest X-ray: Rt sided fractures, CT Chest: Acute 5th and 6th Rib fractures.  Pain control, Tylenol 650mg q6h ATC x24 hours. Hot/Cold packs.   Incentive Spirometry.

## 2021-07-17 NOTE — CHART NOTE - NSCHARTNOTEFT_GEN_A_CORE
S: Patient is a 89F with PMH of HTN, HLD, presents with a fall at home, found to have cellulitis, swelling and erythema in the lower extremities, left leg greater than right leg. Cardiology called to further evaluate ECHO results revealing pericardial effusion with right ventricular diastolic collapse.    O: ICU Vital Signs Last 24 Hrs  T(C): 36.3 (17 Jul 2021 12:16), Max: 36.7 (17 Jul 2021 05:45)  T(F): 97.3 (17 Jul 2021 12:16), Max: 98.1 (17 Jul 2021 05:45)  HR: 81 (17 Jul 2021 12:16) (74 - 88)  BP: 121/62 (17 Jul 2021 12:16) (121/62 - 169/90)  BP(mean): --  ABP: --  ABP(mean): --  RR: 20 (17 Jul 2021 12:16) (18 - 30)  SpO2: 99% (17 Jul 2021 12:16) (92% - 100%)      Patient name: VAUGHN HOSKINS  YOB: 1924   Age: 89 (F)   MR#: [id number]  Study Date: 7/17/2021  [facility]LENA [facility]  Study quality: Technically Fair  Referring Physician: Сергей Bertrand MD  Blood Pressure: 121/62 mmHg  Height: 163 cm  Weight: 76 kg  BSA: 1.8 m2  Heart Rhythm: Normal sinus  Heart Rate: 76 mmHg  ------------------------------------------------------------------------  PROCEDURE: Transthoracic echocardiogram with 2-D, M-Mode  and complete spectral and color flow Doppler.  INDICATION: Pericardial effusion (noninflammatory) (I31.3)  ------------------------------------------------------------------------  DIMENSIONS:  Dimensions:     Normal Values:  LA:     2.3 cm    2.0 - 4.0 cm  Ao:3.5 cm    2.0 - 3.8 cm  SEPTUM: 1.3 cm    0.6 - 1.2 cm  PWT:    1.2 cm    0.6 - 1.1 cm  LVIDd:  3.7 cm    3.0 - 5.6 cm  LVIDs:  2.2 cm    1.8 - 4.0 cm  Derived Variables:  LVMI: 87 g/m2  RWT: 0.64  Fractional short: 41 %  Ejection Fraction (Visual Estimate): 75 %  Peak Velocity (m/sec): TV=2.6  ------------------------------------------------------------------------  OBSERVATIONS:  Mitral Valve: Mitral annular calcification.  Aortic Root: Normal size aortic root .  Aortic Valve: Calcified aortic valve with normal opening.  Mild aortic regurgitation.  Left Atrium: Severe left atrial enlargement.  Left Ventricle: Normal left ventricular internal  dimensions. Mild-moderate concentric hypertrophy.  Normal left ventricular systolic function. No segmental  wall motion abnormalities.  Right Heart: Normal right atrium. Normal right ventricular  size and function.  Normal tricuspid valve. Mild tricuspid regurgitation.  Normal pulmonic valve. Minimal pulmonic regurgitation.  Pericardium/PleuraLargepericardial effusion. Right  ventricular diastolic collapse is present (see loopp #[phone]).  Hemodynamic: Estimated right atrial pressure is severely  elevated.  Mild-moderate pulmoanry hypertension. Estimated PASP 45  mmHg.  ------------------------------------------------------------------------  CONCLUSIONS:  Normal left ventricular systolic function. No segmental  wall motion abnormalities.  Large pericardial effusion.  ---Estimated right atrial pressure is severely elevated.  Right ventricular diastolic collapse is present. Findings  demonstrate pericardial tamponade.  Mild-moderate pulmoanry hypertension.  ------------------------------------------------------------------------  Confirmed on  7/17/2021 - 15:22:53 by Jose Harmon M.D.    A: 89F with large pericardial effusion. ECHO revealing RV diastolic collapse    P:   -patient remains hemodynamically stable and requires no emergent intervention today  - patient had pericardial effusion in 2019 and received a pericardiocentesis, she was followed by Dr. Brianna Verduzco in [facility].   -patient now with a likely reaccumulation of fluid that has grown over time, more chronic  -patient does appear short of breath at rest, not in distress at this time, and family at bedside reports this is her baseline  -if HR goes above 100 or BP drops below 100, can give IV fluids to support blood pressure and call cardiology  -D/C heparin gtt for now, US of LE reveals no DVT, hep gtt was started for concern for PE, but likely the effusion is causing SOB and ECHO findings  -plan for possible pericardiocentesis on Monday in the cath lab under fluoroscopy   -keep NPO p MN on Sunday  -this plan has been discussed with Dr. Wilkins

## 2021-07-17 NOTE — PROGRESS NOTE ADULT - PROBLEM SELECTOR PLAN 2
- Desat overnight to 80s. Now stable on 3L NC  - VBG c/w acute respiratory acidosis. Will repeat VBG, may need BIPAP if worsening hypercapnia   - CXR on my review unchanged; f/u official read  - Check D-dimer, LE duplex still pending

## 2021-07-17 NOTE — CONSULT NOTE ADULT - ASSESSMENT
89F with HTN, HLD, Chronic Venous Stasis Dermatitis, Pericardial Effusion who presents to ED for repeated falls over the past week, last fall last night, pt states she lost her footing and landed on the couch, was able to right herself up but was unable to get out of bed, pt has been feeling weak and having leg weakness x weeks.    Pericardial effusion without tamponade: Chronic in nature given that she is hemodynamically stable / not in clinical tamponade with BP in 160s systolics. Suspicious that this may be 2/2 malignancy given that she has axillary lymphadenopathy as well. Previous pericardial effusion studies without any abnormalities. Patient is DNR/DNI however does wish to have pericardiocentesis if need be.   - echo pending (will be done today) - depending on how much fluid anterior to RV, can possibly do pericardiocentesis in cath lab vs with IR, however currently no urgent need as she is not in clinical tamponade    Leighann Lazcano MD  Cardiology Fellow, PGY-5  [phone]  For all other Cardiology service contact information, go to [email or web address] and use "[email or web address]" to login.

## 2021-07-18 NOTE — PROGRESS NOTE ADULT - PROBLEM SELECTOR PLAN 5
Medications sent to pharmacy   CT Chest with large pericardial effusion. Asymptomatic, vitals stable.   Follow up Echocardiogram.   Telemetry monitoring for now.   Cardiology consult called

## 2021-07-18 NOTE — CONSULT NOTE ADULT - SUBJECTIVE AND OBJECTIVE BOX
CHIEF COMPLAINT: SOB     HPI:  History obtained directly from patient who is alert/oriented x4. A 89F PMH of HTN, HLD, Chronic Venous Stasis Dermatitis, Pericardial Effusion who presents to ED for repeated falls over the past week, last fall last night, pt states she lost her footing and landed on the couch, was able to right herself up but was unable to get out of bed, pt has been feeling weak and having leg weakness x weeks. Pt also endorses L ankle pain since the fall last night, as per daughter in room pt had wound which was being treated on R leg but unsure of recent swelling. Pt denies any recent/current symptoms of shortness of breath, chest pain, abdominal pain, nausea, vomiting, dizziness, paplitations, urinary frequency, denies fevers/chills, nausea/vomiting. Patient states that she is independent in most activities of daily living and ambulates with a cane and at times "crawls" up the stairs to get to the 2nd level. Receives HHA 4 hours a day , 7 days week.     Found to have 5th/6th rib fractures, incidental pericardial effusion, and cellulitis- admitted for pain control and IV antibiotics. Found to have increasing pericardial effusion, likely chronic - plan for pericardiocentesis with cardiology possibly tomorrow. MICU consulted for shortness of breath. Patient currently on 4L NC saturating %. P    PAST MEDICAL & SURGICAL HISTORY:  HTN (Hypertension)    Squamous Cell Carcinoma of Skin of Face  removed from R cheek 4 years ago    Abnormality of femur        FAMILY HISTORY:  No pertinent family history in first degree relatives    Allergies    No Known Allergies    Intolerances        HOME MEDICATIONS:    REVIEW OF SYSTEMS:  CONSTITUTIONAL: No weakness, fevers or chills  EYES/ENT: No visual changes;  No vertigo or throat pain   RESPIRATORY: No cough, wheezing, hemoptysis; +SOB   CARDIOVASCULAR: No chest pain or palpitations  GASTROINTESTINAL: No abdominal or epigastric pain. No nausea, vomiting, or hematemesis; No diarrhea or constipation. No melena or hematochezia.  GENITOURINARY: No dysuria, frequency or hematuria  NEUROLOGICAL: No flaccid paralysis  SKIN: No itching, rashes  MSK: no joint deformities  PSYCH: no SI/HI   [ ] Unable to assess ROS because ________    OBJECTIVE:  ICU Vital Signs Last 24 Hrs  T(C): 36.6 (2021 06:17), Max: 36.6 (2021 23:56)  T(F): 97.9 (2021 06:17), Max: 97.9 (2021 06:17)  HR: 84 (2021 06:17) (70 - 84)  BP: 140/64 (2021 06:17) (117/60 - 140/64)  BP(mean): --  ABP: --  ABP(mean): --  RR: 20 (2021 06:17) (19 - 20)  SpO2: 97% (2021 06:17) (97% - 100%)        CAPILLARY BLOOD GLUCOSE          PHYSICAL EXAM:  GENERAL: NAD, mentating well , on 4L NC  EYES: EOMI, PERRLA, conjunctiva and sclera clear  ENT: Moist mucous membranes  NECK: Supple, notable JVD, mild hepatojugular reflux   CHEST/LUNG: wheezes heard on auscultation b/l lung fields, No rales, rhonchi, wheezing, or rubs. Unlabored respirations  HEART: Regular rate and rhythm; No murmurs, rubs, or gallops  ABDOMEN: Bowel sounds present; Soft, Nontender, Nondistended. No hepatomegaly  EXTREMITIES:  2+ Peripheral Pulses, brisk capillary refill. No clubbing, cyanosis, or edema  NERVOUS SYSTEM:  Alert & Oriented X 2 speech clear. No deficits   MSK: FROM all 4 extremities, full and equal strength  SKIN: cellulitic rash seen on [facility]    HOSPITAL MEDICATIONS:  MEDICATIONS  (STANDING):  albuterol/ipratropium for Nebulization. 3 milliLiter(s) Nebulizer once  amLODIPine   Tablet 10 milliGRAM(s) Oral daily  ampicillin/sulbactam  IVPB      ampicillin/sulbactam  IVPB 1.5 Gram(s) IV Intermittent every 6 hours  aspirin enteric coated 81 milliGRAM(s) Oral daily  lidocaine   Patch 1 Patch Transdermal daily  magnesium sulfate  IVPB 2 Gram(s) IV Intermittent once    MEDICATIONS  (PRN):  acetaminophen   Tablet .. 975 milliGRAM(s) Oral every 8 hours PRN Temp greater or equal to 38.5C (101.3F), Mild Pain (1 - 3)  aluminum hydroxide/magnesium hydroxide/simethicone Suspension 30 milliLiter(s) Oral every 4 hours PRN Dyspepsia  melatonin 3 milliGRAM(s) Oral at bedtime PRN Insomnia      LABS:                        12.0   5.97  )-----------( 164      ( 2021 09:53 )             38.4         141  |  104  |  27<H>  ----------------------------<  99  4.4   |  22  |  1.09    Ca    9.1      2021 07:32  Phos  4.8       Mg     1.80         TPro  6.4  /  Alb  3.3  /  TBili  0.3  /  DBili  x   /  AST  17  /  ALT  13  /  AlkPhos  71      PT/INR - ( 2021 09:53 )   PT: 12.8 sec;   INR: 1.12 ratio         PTT - ( 2021 21:29 )  PTT:39.5 sec  Urinalysis Basic - ( 2021 15:11 )    Color: Yellow / Appearance: Clear / S.026 / pH: x  Gluc: x / Ketone: Negative  / Bili: Negative / Urobili: 3 mg/dL   Blood: x / Protein: 30 mg/dL / Nitrite: Negative   Leuk Esterase: Negative / RBC: 3-5 /HPF / WBC 2 /HPF   Sq Epi: x / Non Sq Epi: 1 /HPF / Bacteria: Few        Venous Blood Gas:   @ 09:53  7.21/81/44/26/71.8  VBG Lactate: 1.5  Venous Blood Gas:   @ 13:11  7.26/72/<24/26/36.7  VBG Lactate: 1.0  Venous Blood Gas:   @ 07:44  7.24/71/53/25/79.2  VBG Lactate: 1.7      MICROBIOLOGY:     RADIOLOGY:  [ ] Reviewed and interpreted by me    EKG:

## 2021-07-18 NOTE — PROGRESS NOTE ADULT - SUBJECTIVE AND OBJECTIVE BOX
Subjective/Objective: patient followup this afternoon given pericardial effusion with tamponade on ECHO. Patient is resting in bed, denies SOB though appears with some mild SOB at rest. No chest pain. VS remain stable and saturating well on 4L NC O2.    MEDICATIONS  (STANDING):  albuterol/ipratropium for Nebulization. 3 milliLiter(s) Nebulizer once  amLODIPine   Tablet 10 milliGRAM(s) Oral daily  ampicillin/sulbactam  IVPB      ampicillin/sulbactam  IVPB 1.5 Gram(s) IV Intermittent every 6 hours  aspirin enteric coated 81 milliGRAM(s) Oral daily  lidocaine   Patch 1 Patch Transdermal daily  magnesium sulfate  IVPB 2 Gram(s) IV Intermittent once    MEDICATIONS  (PRN):  acetaminophen   Tablet .. 975 milliGRAM(s) Oral every 8 hours PRN Temp greater or equal to 38.5C (101.3F), Mild Pain (1 - 3)  aluminum hydroxide/magnesium hydroxide/simethicone Suspension 30 milliLiter(s) Oral every 4 hours PRN Dyspepsia  melatonin 3 milliGRAM(s) Oral at bedtime PRN Insomnia      Vital Signs Last 24 Hrs  T(C): 36.6 (18 Jul 2021 06:17), Max: 36.6 (17 Jul 2021 23:56)  T(F): 97.9 (18 Jul 2021 06:17), Max: 97.9 (18 Jul 2021 06:17)  HR: 84 (18 Jul 2021 06:17) (70 - 84)  BP: 140/64 (18 Jul 2021 06:17) (117/60 - 140/64)  BP(mean): --  RR: 20 (18 Jul 2021 06:17) (19 - 20)  SpO2: 97% (18 Jul 2021 06:17) (97% - 100%)  I&O's Detail        PHYSICAL EXAM  GEN: NAD, skin W & D  RESP: decreased BS ant but poor inspiratory effort  CV: nl S1S2, no M/R/C  GI: soft, NT/ND  EXT: + B/L LE edema, L > R with + errhythma  NEURO: lethargic but arousable, responsive, answered simple questions    EKG/ TELEM: NSR    LABS:                          12.0   5.97  )-----------( 164      ( 18 Jul 2021 09:53 )             38.4     PT/INR - ( 18 Jul 2021 09:53 )   PT: 12.8 sec;   INR: 1.12 ratio         PTT - ( 17 Jul 2021 21:29 )  PTT:39.5 sec  18 Jul 2021 07:32    141    |  104    |  27<H>  ----------------------------<  99     4.4     |  22     |  1.09     17 Jul 2021 13:11    142    |  102    |  22     ----------------------------<  114<H>  3.9     |  25     |  0.96     Ca    9.1        18 Jul 2021 07:32  Ca    9.1        17 Jul 2021 13:11  Phos  4.8<H>     18 Jul 2021 07:32  Phos  4.5       17 Jul 2021 13:11  Mg     1.80      18 Jul 2021 07:32  Mg     1.90      17 Jul 2021 13:11    TPro  6.4    /  Alb  3.3    /  TBili  0.3    /  DBili  x      /  AST  17     /  ALT  13     /  AlkPhos  71     18 Jul 2021 07:32  TPro  6.6    /  Alb  3.6    /  TBili  0.4    /  DBili  x      /  AST  17     /  ALT  13     /  AlkPhos  78     17 Jul 2021 13:11          Creatine Kinase, Serum: 40 U/L (07-16-21 @ 11:29)                 Subjective/Objective: patient followup this afternoon given pericardial effusion with tamponade on ECHO. Patient is resting in bed, denies SOB though appears with some mild SOB at rest. No chest pain. VS remain stable and saturating well on 4L NC O2.    MEDICATIONS  (STANDING):  albuterol/ipratropium for Nebulization. 3 milliLiter(s) Nebulizer once  amLODIPine   Tablet 10 milliGRAM(s) Oral daily  ampicillin/sulbactam  IVPB      ampicillin/sulbactam  IVPB 1.5 Gram(s) IV Intermittent every 6 hours  aspirin enteric coated 81 milliGRAM(s) Oral daily  lidocaine   Patch 1 Patch Transdermal daily  magnesium sulfate  IVPB 2 Gram(s) IV Intermittent once    MEDICATIONS  (PRN):  acetaminophen   Tablet .. 975 milliGRAM(s) Oral every 8 hours PRN Temp greater or equal to 38.5C (101.3F), Mild Pain (1 - 3)  aluminum hydroxide/magnesium hydroxide/simethicone Suspension 30 milliLiter(s) Oral every 4 hours PRN Dyspepsia  melatonin 3 milliGRAM(s) Oral at bedtime PRN Insomnia      Vital Signs Last 24 Hrs  T(C): 36.6 (18 Jul 2021 06:17), Max: 36.6 (17 Jul 2021 23:56)  T(F): 97.9 (18 Jul 2021 06:17), Max: 97.9 (18 Jul 2021 06:17)  HR: 84 (18 Jul 2021 06:17) (70 - 84)  BP: 140/64 (18 Jul 2021 06:17) (117/60 - 140/64)  BP(mean): --  RR: 20 (18 Jul 2021 06:17) (19 - 20)  SpO2: 97% (18 Jul 2021 06:17) (97% - 100%)  I&O's Detail        PHYSICAL EXAM  GEN: NAD, skin W & D, very mild SOB at rest but saturating well on NC O2  RESP: decreased BS ant but poor inspiratory effort  CV: nl S1S2, no M/R/C  GI: soft, NT/ND  EXT: + B/L LE edema, L > R with + errhythma  NEURO: lethargic but arousable, responsive, answered simple questions    EKG/ TELEM: NSR    LABS:                          12.0   5.97  )-----------( 164      ( 18 Jul 2021 09:53 )             38.4     PT/INR - ( 18 Jul 2021 09:53 )   PT: 12.8 sec;   INR: 1.12 ratio         PTT - ( 17 Jul 2021 21:29 )  PTT:39.5 sec  18 Jul 2021 07:32    141    |  104    |  27<H>  ----------------------------<  99     4.4     |  22     |  1.09     17 Jul 2021 13:11    142    |  102    |  22     ----------------------------<  114<H>  3.9     |  25     |  0.96     Ca    9.1        18 Jul 2021 07:32  Ca    9.1        17 Jul 2021 13:11  Phos  4.8<H>     18 Jul 2021 07:32  Phos  4.5       17 Jul 2021 13:11  Mg     1.80      18 Jul 2021 07:32  Mg     1.90      17 Jul 2021 13:11    TPro  6.4    /  Alb  3.3    /  TBili  0.3    /  DBili  x      /  AST  17     /  ALT  13     /  AlkPhos  71     18 Jul 2021 07:32  TPro  6.6    /  Alb  3.6    /  TBili  0.4    /  DBili  x      /  AST  17     /  ALT  13     /  AlkPhos  78     17 Jul 2021 13:11          Creatine Kinase, Serum: 40 U/L (07-16-21 @ 11:29)

## 2021-07-18 NOTE — PROGRESS NOTE ADULT - ASSESSMENT
89F with HTN, HLD, chronic venous stasis, and h/o frequent falls presents after mechanical fall with subsequent 5th/6th rib fractures. Also with incidental finding on ECHO of large pericardial effusion with pericardial tamponade, etiology not yet known. Patient is hemodynamically stable at present with plan for pericardiocentesis in the am.    Plan:    1. Continue close monitoring of VS  2. IVF support if indicated  3. SOB likely R/T rib fractures, incentive spirometry if can tolerate, continue to monitor  4. NPO after MN for pericardiocentesis in am, site to be determined (cath lab vs. IR)  5. Cardiology will continue to follow. Can call [place] [phone] in am for scheduling of pericardiocentesis.

## 2021-07-18 NOTE — PROGRESS NOTE ADULT - PROBLEM SELECTOR PLAN 2
- Desat two nights ago to 80s. Now stable on 3L NC  - VBG c/w acute respiratory acidosis. Worsening   - CXR on my review unchanged; f/u official read  - D-dimer elevated. CTA pending r/o PE - Desat two nights ago to 80s. Now stable on 3L NC.  - VBG c/w acute respiratory acidosis. Worsening hypercapnia and now pt encephalopathic secondary to hypercapnia. Unable to start BIPAP given concern for tamponade. MICU consulted- could consider Highflow  - CXR unchanged  - D-dimer elevated. CTA pending r/o PE

## 2021-07-18 NOTE — CONSULT NOTE ADULT - ASSESSMENT
89F PMH of HTN, HLD, chronic venous stasis, recent multiple falls found to have cellulitis, rib fractures presents with SOB, found to have increasing pericardial fluid - hemodynamically stable, saturating well on 4L oxygen NC, plan for possible pericardiocentesis for tamponade physiology.    Recommendations    #Shortness of Breath  - likely secondary to recent rib fractures and pericardial effusion ; follow up with cardiology for pericardiocentesis  - echocardiogram results revealing pericardial effusion with right ventricular diastolic collapse  - CT scan c/w large, worsening pericardial effusion  - VBG with hypercapnea   - hemodynamically stable, saturating well on 4 L NC  - consider high-flow oxygen for supplemental oxygen ; BiPAP may worsen RV function, decreased preload i/s/o tamponade physiology     ** incomplete **  89F PMH of HTN, HLD, chronic venous stasis, recent multiple falls found to have cellulitis, rib fractures presents with SOB, found to have increasing pericardial fluid - hemodynamically stable, saturating well on 4L oxygen NC, plan for possible pericardiocentesis for tamponade physiology.    Recommendations    #Shortness of Breath  - likely secondary to recent rib fractures and pericardial effusion ; follow up with cardiology for pericardiocentesis  - echocardiogram results revealing pericardial effusion with right ventricular diastolic collapse  - CT scan c/w large, worsening pericardial effusion  - VBG with hypercapnea   - hemodynamically stable, saturating well on 4 L NC  - consider high-flow oxygen for supplemental oxygen ; BiPAP may worsen RV function, decreased preload i/s/o tamponade physiology     Patient is currently not a MICU candidate. Please reconsult if clinical condition changes 89F PMH of HTN, HLD, chronic venous stasis, recent multiple falls found to have cellulitis, rib fractures presents with SOB, found to have increasing pericardial fluid - hemodynamically stable, saturating well on 4L oxygen NC, plan for possible pericardiocentesis for tamponade physiology.    Recommendations    #Shortness of Breath  - likely secondary to recent rib fractures and pericardial effusion ; follow up with cardiology for pericardiocentesis  - echocardiogram results revealing pericardial effusion with right ventricular diastolic collapse  - CT scan c/w large, worsening pericardial effusion  - hemodynamically stable, saturating well on 4 L NC  - patient with hypercapnic respiratory failure  - per family, patient is more altered--> currently AxOx1, baseline is AxOx3  - as patient with AMS likely from hypercarbia, would benefit from bilevel support  - recommend BiPAP 10/5       Patient is currently not a MICU candidate. Please reconsult if clinical condition changes

## 2021-07-18 NOTE — PROVIDER CONTACT NOTE (CRITICAL VALUE NOTIFICATION) - ACTION/TREATMENT ORDERED:
ACP Vickie Cornelius aware. May consider placing pt on BiPap. Magnesium Sulfate to be order. Will continue to monitor. KARLO Cornelius aware. May consider placing pt on BiPAP. Will continue to monitor.

## 2021-07-18 NOTE — PROGRESS NOTE ADULT - SUBJECTIVE AND OBJECTIVE BOX
Patient is a 89y old  Female who presents with a chief complaint of Frequent Falls, concern for Cellulitis. (2021 13:02)      SUBJECTIVE / OVERNIGHT EVENTS: No acute events overnight. Pt awake but lethargic, not answering questions.     ADDITIONAL REVIEW OF SYSTEMS:    MEDICATIONS  (STANDING):  albuterol/ipratropium for Nebulization. 3 milliLiter(s) Nebulizer once  amLODIPine   Tablet 10 milliGRAM(s) Oral daily  ampicillin/sulbactam  IVPB      ampicillin/sulbactam  IVPB 1.5 Gram(s) IV Intermittent every 6 hours  aspirin enteric coated 81 milliGRAM(s) Oral daily  lidocaine   Patch 1 Patch Transdermal daily  magnesium sulfate  IVPB 2 Gram(s) IV Intermittent once    MEDICATIONS  (PRN):  acetaminophen   Tablet .. 975 milliGRAM(s) Oral every 8 hours PRN Temp greater or equal to 38.5C (101.3F), Mild Pain (1 - 3)  aluminum hydroxide/magnesium hydroxide/simethicone Suspension 30 milliLiter(s) Oral every 4 hours PRN Dyspepsia  melatonin 3 milliGRAM(s) Oral at bedtime PRN Insomnia      CAPILLARY BLOOD GLUCOSE        I&O's Summary      PHYSICAL EXAM:  Vital Signs Last 24 Hrs  T(C): 36.6 (2021 06:17), Max: 36.6 (2021 23:56)  T(F): 97.9 (2021 06:17), Max: 97.9 (2021 06:17)  HR: 84 (2021 06:17) (70 - 84)  BP: 140/64 (2021 06:17) (117/60 - 140/64)  BP(mean): --  RR: 20 (2021 06:17) (19 - 20)  SpO2: 97% (2021 06:17) (97% - 100%)  GENERAL: NAD, elderly, frail, lethargic  HEAD:  Atraumatic, Normocephalic  EYES: EOMI, conjunctiva and sclera clear  NECK: Supple, No JVD  CHEST/LUNG: Clear to auscultation bilaterally; No wheeze  HEART: Regular rate and rhythm; No murmurs, rubs, or gallops  ABDOMEN: Soft, Nontender, Nondistended; Bowel sounds present  EXTREMITIES:  2+ Peripheral Pulses, No clubbing, cyanosis, or edema  NEUROLOGY: non-focal, awake but lethargic and not answering questions or following commands   SKIN: No rashes or lesions    LABS:                        12.0   5.97  )-----------( 164      ( 2021 09:53 )             38.4     07-18    141  |  104  |  27<H>  ----------------------------<  99  4.4   |  22  |  1.09    Ca    9.1      2021 07:32  Phos  4.8     07-  Mg     1.80     -    TPro  6.4  /  Alb  3.3  /  TBili  0.3  /  DBili  x   /  AST  17  /  ALT  13  /  AlkPhos  71  -18    PT/INR - ( 2021 09:53 )   PT: 12.8 sec;   INR: 1.12 ratio         PTT - ( 2021 21:29 )  PTT:39.5 sec      Urinalysis Basic - ( 2021 15:11 )    Color: Yellow / Appearance: Clear / S.026 / pH: x  Gluc: x / Ketone: Negative  / Bili: Negative / Urobili: 3 mg/dL   Blood: x / Protein: 30 mg/dL / Nitrite: Negative   Leuk Esterase: Negative / RBC: 3-5 /HPF / WBC 2 /HPF   Sq Epi: x / Non Sq Epi: 1 /HPF / Bacteria: Few        Culture - Urine (collected 2021 15:00)  Source: .Urine Clean Catch (Midstream)  Final Report (2021 13:53):    No growth    Culture - Blood (collected 2021 14:31)  Source: .Blood Blood-Peripheral  Preliminary Report (2021 15:05):    No growth to date.    Culture - Blood (collected 2021 14:31)  Source: .Blood Blood-Peripheral  Preliminary Report (2021 15:05):    No growth to date.        RADIOLOGY & ADDITIONAL TESTS:  Results Reviewed:   Imaging Personally Reviewed:  Electrocardiogram Personally Reviewed:    COORDINATION OF CARE:  Care Discussed with Consultants/Other Providers [Y/N]:  Prior or Outpatient Records Reviewed [Y/N]:

## 2021-07-19 NOTE — PROGRESS NOTE ADULT - SUBJECTIVE AND OBJECTIVE BOX
Patient is a 89y old  Female who presents with a chief complaint of Frequent Falls, concern for Cellulitis. (2021 13:02)    SUBJECTIVE / OVERNIGHT EVENTS: O/N had 9 beats of VTACH otherwise w/o events. Patient placed on BIPAP for hypercarbia. Patient otherwise w/o overnight events. Patient complaining of the BIPAP being uncomfortable. Patient otherwise w/o complaints.     ADDITIONAL REVIEW OF SYSTEMS:    MEDICATIONS  (STANDING):  albuterol/ipratropium for Nebulization. 3 milliLiter(s) Nebulizer once  amLODIPine   Tablet 10 milliGRAM(s) Oral daily  ampicillin/sulbactam  IVPB      ampicillin/sulbactam  IVPB 1.5 Gram(s) IV Intermittent every 6 hours  aspirin enteric coated 81 milliGRAM(s) Oral daily  lidocaine   Patch 1 Patch Transdermal daily    MEDICATIONS  (PRN):  acetaminophen   Tablet .. 975 milliGRAM(s) Oral every 8 hours PRN Temp greater or equal to 38.5C (101.3F), Mild Pain (1 - 3)  aluminum hydroxide/magnesium hydroxide/simethicone Suspension 30 milliLiter(s) Oral every 4 hours PRN Dyspepsia  melatonin 3 milliGRAM(s) Oral at bedtime PRN Insomnia    PHYSICAL EXAM:  Vital Signs Last 24 Hrs  T(C): 36.8 (2021 12:14), Max: 36.8 (2021 12:14)  T(F): 98.2 (2021 12:14), Max: 98.2 (2021 12:14)  HR: 82 (2021 16:36) (75 - 100)  BP: 132/62 (2021 12:14) (100/60 - 138/80)  BP(mean): --  RR: 17 (2021 12:14) (17 - 20)  SpO2: 100% (2021 16:36) (89% - 100%)    GENERAL: NAD, elderly, frail, lethargic  HEAD:  Atraumatic, Normocephalic  EYES: EOMI, conjunctiva and sclera clear  NECK: Supple, No JVD  CHEST/LUNG: Clear to auscultation bilaterally; No wheeze. Comfortable on BIPAP.   HEART: Regular rate and rhythm; No murmurs, rubs, or gallops  ABDOMEN: Soft, Nontender, Nondistended; Bowel sounds present  EXTREMITIES:  2+ Peripheral Pulses, No clubbing, cyanosis, or edema  NEUROLOGY: non-focal, awake, answering questions.   SKIN: No rashes or lesions    LABS:                         10.6   5.47  )-----------( 170      ( 2021 04:05 )             33.8     07-19    141  |  104  |  31<H>  ----------------------------<  106<H>  4.3   |  22  |  1.11    Ca    9.0      2021 04:05  Phos  4.2     07-19  Mg     2.40     -19    TPro  5.7<L>  /  Alb  3.4  /  TBili  0.3  /  DBili  x   /  AST  14  /  ALT  13  /  AlkPhos  64  07-19    PT/INR - ( 2021 09:53 )   PT: 12.8 sec;   INR: 1.12 ratio         PTT - ( 2021 04:07 )  PTT:160.7 sec              RADIOLOGY, EKG & ADDITIONAL TESTS: Reviewed.       Urinalysis Basic - ( 2021 15:11 )    Color: Yellow / Appearance: Clear / S.026 / pH: x  Gluc: x / Ketone: Negative  / Bili: Negative / Urobili: 3 mg/dL   Blood: x / Protein: 30 mg/dL / Nitrite: Negative   Leuk Esterase: Negative / RBC: 3-5 /HPF / WBC 2 /HPF   Sq Epi: x / Non Sq Epi: 1 /HPF / Bacteria: Few        Culture - Urine (collected 2021 15:00)  Source: .Urine Clean Catch (Midstream)  Final Report (2021 13:53):    No growth    Culture - Blood (collected 2021 14:31)  Source: .Blood Blood-Peripheral  Preliminary Report (2021 15:05):    No growth to date.    Culture - Blood (collected 2021 14:31)  Source: .Blood Blood-Peripheral  Preliminary Report (2021 15:05):    No growth to date.        RADIOLOGY & ADDITIONAL TESTS:  Results Reviewed:   Imaging Personally Reviewed:  Electrocardiogram Personally Reviewed:    COORDINATION OF CARE:  Care Discussed with Consultants/Other Providers [Y/N]:  Prior or Outpatient Records Reviewed [Y/N]:

## 2021-07-19 NOTE — PROGRESS NOTE ADULT - PROBLEM SELECTOR PLAN 3
CT Head/CT Cervical Spine with no acute pathology.   X-rays Ankle, Knee, Pelvis, Tib/Fib negative for acute pathology.   Pain control, Tylenol 650mg q6h ATC x24 hours. Hot/Cold packs. Lidocaine patch.   Incentive Spirometry.  Fall Risk Protocol, PT evaluation--> AILYN.  B12, Folate, TSH wnl

## 2021-07-19 NOTE — PROGRESS NOTE ADULT - PROBLEM SELECTOR PLAN 2
- Patient on BIPAP due to hypercarbic respiratory failure symptomatic w/ encephalopathy. Mental status now improved on BIPAP.   - CXR unchanged  - D-dimer elevated. CTA performed awaiting results

## 2021-07-19 NOTE — CONSULT NOTE ADULT - ASSESSMENT
Interventional Radiology    Evaluate for Procedure: pericardiocentesis    HPI: 89y Female with history of frequent falls, p/w mechanical fall and rib fractures, and a large pericardial effusion was incidentally found. Echo showed tamponade physiology. IR was consulted for pericardiocentesis.     Allergies:   Medications (Abx/Cardiac/Anticoagulation/Blood Products)  amLODIPine   Tablet: 10 milliGRAM(s) Oral (07-18 @ 06:27)  ampicillin/sulbactam  IVPB: 100 mL/Hr IV Intermittent (07-19 @ 04:05)  aspirin enteric coated: 81 milliGRAM(s) Oral (07-18 @ 12:21)  enoxaparin Injectable: 40 milliGRAM(s) SubCutaneous (07-17 @ 12:18)  heparin  Infusion.: 1400 Unit(s)/Hr IV Continuous (07-17 @ 14:41)  heparin  Infusion.: 0 Unit(s)/Hr IV Continuous (07-18 @ 21:11)    Data:    T(C): 36.4  HR: 75  BP: 138/80  RR: 20  SpO2: 100%    -WBC 5.47 / HgB 10.6 / Hct 33.8 / Plt 170  -Na 141 / Cl 104 / BUN 31 / Glucose 106  -K 4.3 / CO2 22 / Cr 1.11  -ALT 13 / Alk Phos 64 / T.Bili 0.3  -INR -- / .7      Radiology: CT chest reviewed    Assessment/Plan:   89y Female with history of frequent falls, p/w mechanical fall and rib fractures, and a large pericardial effusion was incidentally found. Echo showed tamponade physiology. IR was consulted for pericardiocentesis.   -- IR will plan to perform pericardiocentesis today 7/19  -- please complete IR pre-procedure note  -- please place IR procedure request order under Dr. Pavon    --  Mario Villegas DO/ZEE  Interventional Radiology Resident (PGY-3)   IR Pager #[phone] Interventional Radiology    Evaluate for Procedure: pericardiocentesis    HPI: 89y Female with history of frequent falls, p/w mechanical fall and rib fractures, and a large pericardial effusion was incidentally found. Echo showed tamponade physiology. IR was consulted for pericardiocentesis.     Allergies:   Medications (Abx/Cardiac/Anticoagulation/Blood Products)  amLODIPine   Tablet: 10 milliGRAM(s) Oral (07-18 @ 06:27)  ampicillin/sulbactam  IVPB: 100 mL/Hr IV Intermittent (07-19 @ 04:05)  aspirin enteric coated: 81 milliGRAM(s) Oral (07-18 @ 12:21)  enoxaparin Injectable: 40 milliGRAM(s) SubCutaneous (07-17 @ 12:18)  heparin  Infusion.: 1400 Unit(s)/Hr IV Continuous (07-17 @ 14:41)  heparin  Infusion.: 0 Unit(s)/Hr IV Continuous (07-18 @ 21:11)    Data:    T(C): 36.4  HR: 75  BP: 138/80  RR: 20  SpO2: 100%    -WBC 5.47 / HgB 10.6 / Hct 33.8 / Plt 170  -Na 141 / Cl 104 / BUN 31 / Glucose 106  -K 4.3 / CO2 22 / Cr 1.11  -ALT 13 / Alk Phos 64 / T.Bili 0.3  -INR -- / .7      Radiology: CT chest reviewed    Assessment/Plan:   89y Female with history of frequent falls, p/w mechanical fall and rib fractures, and a large pericardial effusion was incidentally found. Echo showed tamponade physiology. IR was consulted for pericardiocentesis.   -- IR will plan to perform pericardiocentesis today 7/19  -- please complete IR pre-procedure note  -- please place IR procedure request order under Dr. Pavon  -- Procedure/Risks/Benefits/Alternatives discussed with the patient and her daughter. Consent obtained.    Mario Villegas DO/ZEE  Interventional Radiology Resident (PGY-3)   IR Pager #[phone]

## 2021-07-19 NOTE — PROGRESS NOTE ADULT - PROBLEM SELECTOR PLAN 8
DVT ppx: Lovenox SQ   Dispo: Pending hospital course  GOC: DNR/DNI, MOLST form completed, decision maker: patient. Attempted to call her emergency contact Lyric Good but no answer    Reattempted 7/19 w/ no response

## 2021-07-19 NOTE — CHART NOTE - NSCHARTNOTEFT_GEN_A_CORE
Interventional Radiology Pre-Procedure Checklist    Patient Age: 89 years     Patient Gender: Female     Procedure (including site / side if known): Pericardiocentesis     Diagnosis / Indication: Cardiac tamponade     Interventional Radiology Attending Physician: Dr. Pavon    Ordering Attending Physician: Dr. Anders     Pertinent medical history: 89F PMH of HTN, who presents to ED for repeated falls over the past week found to have 5th/6th rib fractures, incidental pericardial effusion, and cellulitis- admitted for pain control and IV Antibiotics.    Pertinent labs:  07-19    141  |  104  |  31<H>  ----------------------------<  106<H>  4.3   |  22  |  1.11    Ca    9.0      19 Jul 2021 04:05  Phos  4.2     07-19  Mg     2.40     07-19    TPro  5.7<L>  /  Alb  3.4  /  TBili  0.3  /  DBili  x   /  AST  14  /  ALT  13  /  AlkPhos  64  07-19                        10.6   5.47  )-----------( 170      ( 19 Jul 2021 04:05 )             33.8       Patient and Family aware? Yes     Theologia MIKEY Day  [id number]

## 2021-07-19 NOTE — PROGRESS NOTE ADULT - PROBLEM SELECTOR PLAN 1
In the setting of bilateral LE swelling, chronic venous stasis dermatitis.   R >L, increased redness and warmth of right LE.   s/p Ceftriaxone, continue IV Unasyn for now (no MRSA risk factors).   BCx NGTD   Doppler negative for DVT

## 2021-07-19 NOTE — PROGRESS NOTE ADULT - PROBLEM SELECTOR PLAN 5
CT Chest with large pericardial effusion. Asymptomatic, vitals stable.   ECHO w/ evidence of tamponade   Telemetry monitoring for now.   Cardiology consult called. Patient for IR pericardiocentesis today.

## 2021-07-20 NOTE — PROGRESS NOTE ADULT - PROBLEM SELECTOR PLAN 1
In the setting of bilateral LE swelling, chronic venous stasis dermatitis.   R >L, increased redness and warmth of right LE.   s/p Ceftriaxone, continue IV Unasyn for now (no MRSA risk factors).   BCx NGTD   Doppler negative for DVT In the setting of bilateral LE swelling, chronic venous stasis dermatitis.   R >L, increased redness and warmth of right LE.   s/p Ceftriaxone, continue IV Unasyn for now (no MRSA risk factors).   BCx NGTD   Doppler negative for DVT  #Constipation  - started on bowel regimen

## 2021-07-20 NOTE — PROGRESS NOTE ADULT - SUBJECTIVE AND OBJECTIVE BOX
Patient is a 89y old  Female who presents with a chief complaint of Frequent Falls, concern for Cellulitis. (2021 13:02)    SUBJECTIVE / OVERNIGHT EVENTS: Patient states her breathing feels improved. Patient s/p pericardiocentesis 700cc of fluid drained, patient w/ chest tube to water seal. Patient complaining of constipation. Patient otherwise w/o complaints. ROS otherwise negative.     ADDITIONAL REVIEW OF SYSTEMS:    MEDICATIONS  (STANDING):  albuterol/ipratropium for Nebulization. 3 milliLiter(s) Nebulizer once  amLODIPine   Tablet 10 milliGRAM(s) Oral daily  ampicillin/sulbactam  IVPB      ampicillin/sulbactam  IVPB 1.5 Gram(s) IV Intermittent every 6 hours  aspirin enteric coated 81 milliGRAM(s) Oral daily  lidocaine   Patch 1 Patch Transdermal daily  polyethylene glycol 3350 17 Gram(s) Oral daily  senna 2 Tablet(s) Oral at bedtime    MEDICATIONS  (PRN):  acetaminophen   Tablet .. 975 milliGRAM(s) Oral every 8 hours PRN Temp greater or equal to 38.5C (101.3F), Mild Pain (1 - 3)  aluminum hydroxide/magnesium hydroxide/simethicone Suspension 30 milliLiter(s) Oral every 4 hours PRN Dyspepsia  melatonin 3 milliGRAM(s) Oral at bedtime PRN Insomnia    Vital Signs Last 24 Hrs  T(C): 36.4 (2021 17:00), Max: 36.6 (2021 21:05)  T(F): 97.5 (2021 17:00), Max: 97.9 (2021 21:05)  HR: 73 (2021 18:30) (69 - 84)  BP: 119/65 (2021 17:00) (108/58 - 139/53)  BP(mean): 76 (2021 22:50) (67 - 76)  RR: 17 (2021 17:00) (17 - 35)  SpO2: 98% (2021 18:30) (98% - 100%)    GENERAL: NAD, elderly, frail, lethargic  HEAD:  Atraumatic, Normocephalic  EYES: EOMI, conjunctiva and sclera clear  NECK: Supple, No JVD  CHEST/LUNG: Clear to auscultation bilaterally; No wheeze. Comfortable on NC. Patient w/ chest tube to water seal.   HEART: Regular rate and rhythm; No murmurs, rubs, or gallops  ABDOMEN: Soft, Nontender, Nondistended; Bowel sounds present  EXTREMITIES:  2+ Peripheral Pulses, No clubbing, cyanosis, or edema  NEUROLOGY: non-focal, awake, answering questions.   SKIN: No rashes or lesions    LABS:                         10.9   9.68  )-----------( 161      ( 2021 05:44 )             34.9     07-20    139  |  102  |  28<H>  ----------------------------<  109<H>  3.7   |  26  |  0.93    Ca    8.5      2021 05:44  Phos  3.5     07-20  Mg     2.20     07-20    TPro  5.3<L>  /  Alb  3.1<L>  /  TBili  0.4  /  DBili  x   /  AST  13  /  ALT  10  /  AlkPhos  63  07-20    PTT - ( 2021 04:07 )  PTT:160.7 sec              RADIOLOGY, EKG & ADDITIONAL TESTS: Reviewed.       Urinalysis Basic - ( 2021 15:11 )    Color: Yellow / Appearance: Clear / S.026 / pH: x  Gluc: x / Ketone: Negative  / Bili: Negative / Urobili: 3 mg/dL   Blood: x / Protein: 30 mg/dL / Nitrite: Negative   Leuk Esterase: Negative / RBC: 3-5 /HPF / WBC 2 /HPF   Sq Epi: x / Non Sq Epi: 1 /HPF / Bacteria: Few        Culture - Urine (collected 2021 15:00)  Source: .Urine Clean Catch (Midstream)  Final Report (2021 13:53):    No growth    Culture - Blood (collected 2021 14:31)  Source: .Blood Blood-Peripheral  Preliminary Report (2021 15:05):    No growth to date.    Culture - Blood (collected 2021 14:31)  Source: .Blood Blood-Peripheral  Preliminary Report (2021 15:05):    No growth to date.        RADIOLOGY & ADDITIONAL TESTS:  Results Reviewed:   Imaging Personally Reviewed:  Electrocardiogram Personally Reviewed:    COORDINATION OF CARE:  Care Discussed with Consultants/Other Providers [Y/N]:  Prior or Outpatient Records Reviewed [Y/N]:

## 2021-07-20 NOTE — PROGRESS NOTE ADULT - PROBLEM SELECTOR PLAN 2
- Patient intermittently requiring BIPAP due to hypercarbic respiratory failure symptomatic w/ encephalopathy. Mental status now improved  - CXR unchanged  - D-dimer elevated. CTA performed -> w/o evidence of PE  - daily VBG

## 2021-07-20 NOTE — PROGRESS NOTE ADULT - PROBLEM SELECTOR PLAN 8
DVT ppx: Lovenox SQ   Dispo: Pending hospital course  GOC: DNR/DNI, MOLST form completed, decision maker: patient.

## 2021-07-20 NOTE — PROGRESS NOTE ADULT - PROBLEM SELECTOR PLAN 5
CT Chest with large pericardial effusion. Asymptomatic, vitals stable.   ECHO w/ evidence of tamponade   S/p pericardiocentesis 700cc drained, chest tube to water seal.   Telemetry monitoring for now.   f/u further cardiology recs

## 2021-07-21 NOTE — PROGRESS NOTE ADULT - PROBLEM SELECTOR PLAN 3
CT Head/CT Cervical Spine with no acute pathology.   X-rays Ankle, Knee, Pelvis, Tib/Fib negative for acute pathology.   Pain control, Tylenol prn. Hot/Cold packs. Lidocaine patch.   Incentive Spirometry.  Fall Risk Protocol, PT evaluation--> AILYN.  B12, Folate, TSH wnl

## 2021-07-21 NOTE — PROGRESS NOTE ADULT - PROBLEM SELECTOR PLAN 4
Chest X-ray: Rt sided fractures, CT Chest: Acute 5th and 6th Rib fractures.  Pain control, Tylenol prn. Hot/Cold packs.   Incentive Spirometry.

## 2021-07-21 NOTE — PROGRESS NOTE ADULT - PROBLEM SELECTOR PLAN 1
In the setting of bilateral LE swelling, chronic venous stasis dermatitis.   R >L, increased redness and warmth of right LE.   s/p Ceftriaxone, continue IV Unasyn for now (no MRSA risk factors)-> last day abx 7/24  BCx NGTD   Doppler negative for DVT  #Constipation  - started on bowel regimen  - monitor BM

## 2021-07-21 NOTE — PROGRESS NOTE ADULT - PROBLEM SELECTOR PLAN 2
- Patient intermittently requiring BIPAP due to hypercarbic respiratory failure symptomatic w/ encephalopathy. Mental status now improved  - CXR unchanged  - D-dimer elevated. CTA performed -> w/o evidence of PE  - daily VBG - patient more SOB from day prior, tired appearing. CXR performed w/ pleural effusion L>R. Will give trial of Lasix 20mg IV push.   - D-dimer elevated. CTA performed -> w/o evidence of PE  - daily VBG

## 2021-07-21 NOTE — PROGRESS NOTE ADULT - SUBJECTIVE AND OBJECTIVE BOX
Patient is a 89y old  Female who presents with a chief complaint of Frequent Falls, concern for Cellulitis. (2021 13:02)    SUBJECTIVE / OVERNIGHT EVENTS: Patient feels more short of breath today. Patient complaining of pain over her broken ribs. Patient denies chest pain. Patient w/o fever or chills. ROS otherwise negative.     ADDITIONAL REVIEW OF SYSTEMS:    MEDICATIONS  (STANDING):  albuterol/ipratropium for Nebulization. 3 milliLiter(s) Nebulizer once  amLODIPine   Tablet 10 milliGRAM(s) Oral daily  ampicillin/sulbactam  IVPB      ampicillin/sulbactam  IVPB 1.5 Gram(s) IV Intermittent every 6 hours  aspirin enteric coated 81 milliGRAM(s) Oral daily  lidocaine   Patch 1 Patch Transdermal daily  polyethylene glycol 3350 17 Gram(s) Oral daily  senna 2 Tablet(s) Oral at bedtime    MEDICATIONS  (PRN):  acetaminophen   Tablet .. 975 milliGRAM(s) Oral every 8 hours PRN Temp greater or equal to 38.5C (101.3F), Mild Pain (1 - 3)  aluminum hydroxide/magnesium hydroxide/simethicone Suspension 30 milliLiter(s) Oral every 4 hours PRN Dyspepsia  melatonin 3 milliGRAM(s) Oral at bedtime PRN Insomnia    Vital Signs Last 24 Hrs  T(C): 36.6 (2021 17:40), Max: 36.8 (2021 13:45)  T(F): 97.8 (2021 17:40), Max: 98.2 (2021 13:45)  HR: 77 (2021 18:49) (63 - 88)  BP: 118/67 (2021 17:40) (106/53 - 129/60)  BP(mean): --  RR: 20 (2021 17:40) (16 - 20)  SpO2: 100% (2021 18:49) (95% - 100%)    GENERAL: NAD, elderly, frail, lethargic  HEAD:  Atraumatic, Normocephalic  EYES: EOMI, conjunctiva and sclera clear  NECK: Supple, No JVD  CHEST/LUNG: Clear to auscultation bilaterally; No wheeze. Comfortable on NC. Patient w/ chest tube to water seal.   HEART: Regular rate and rhythm; No murmurs, rubs, or gallops  ABDOMEN: Soft, Nontender, Nondistended; Bowel sounds present  EXTREMITIES:  2+ Peripheral Pulses, No clubbing, cyanosis, or edema  NEUROLOGY: non-focal, awake, answering questions.   SKIN: No rashes or lesions    LABS:                         10.6   6.41  )-----------( 146      ( 2021 07:04 )             33.2     07-21    138  |  102  |  38<H>  ----------------------------<  120<H>  4.3   |  26  |  1.29    Ca    8.5      2021 07:04  Phos  3.5     07-21  Mg     2.20     07-21    TPro  5.3<L>  /  Alb  3.1<L>  /  TBili  0.4  /  DBili  x   /  AST  13  /  ALT  10  /  AlkPhos  63  07-20                  RADIOLOGY, EKG & ADDITIONAL TESTS: Reviewed.       Urinalysis Basic - ( 2021 15:11 )    Color: Yellow / Appearance: Clear / S.026 / pH: x  Gluc: x / Ketone: Negative  / Bili: Negative / Urobili: 3 mg/dL   Blood: x / Protein: 30 mg/dL / Nitrite: Negative   Leuk Esterase: Negative / RBC: 3-5 /HPF / WBC 2 /HPF   Sq Epi: x / Non Sq Epi: 1 /HPF / Bacteria: Few        Culture - Urine (collected 2021 15:00)  Source: .Urine Clean Catch (Midstream)  Final Report (2021 13:53):    No growth    Culture - Blood (collected 2021 14:31)  Source: .Blood Blood-Peripheral  Preliminary Report (2021 15:05):    No growth to date.    Culture - Blood (collected 2021 14:31)  Source: .Blood Blood-Peripheral  Preliminary Report (2021 15:05):    No growth to date.        RADIOLOGY & ADDITIONAL TESTS:  Results Reviewed:   Imaging Personally Reviewed:  Electrocardiogram Personally Reviewed:    COORDINATION OF CARE:  Care Discussed with Consultants/Other Providers [Y/N]:  Prior or Outpatient Records Reviewed [Y/N]:

## 2021-07-22 NOTE — PROGRESS NOTE ADULT - SUBJECTIVE AND OBJECTIVE BOX
Pt. examined at bedside  c/o R sided rib pain at site of fracture  no other complaints      Vital Signs Last 24 Hrs  T(C): 36.6 (22 Jul 2021 06:45), Max: 36.8 (21 Jul 2021 13:45)  T(F): 97.9 (22 Jul 2021 06:45), Max: 98.2 (21 Jul 2021 13:45)  HR: 74 (22 Jul 2021 06:45) (63 - 77)  BP: 120/62 (22 Jul 2021 06:45) (108/57 - 129/60)  BP(mean): --  RR: 17 (22 Jul 2021 06:45) (16 - 20)  SpO2: 99% (22 Jul 2021 06:45) (95% - 100%)    Focused Exam findings:    General: NAD  Chest: CTA b/l   pericardial drain in place  site c/d/i        LABS:                        11.0   5.34  )-----------( 126      ( 22 Jul 2021 08:06 )             35.4     07-22    138  |  103  |  37<H>  ----------------------------<  132<H>  4.3   |  23  |  1.08    Ca    8.6      22 Jul 2021 08:06  Phos  3.5     07-21  Mg     2.20     07-21      I&O's Detail    21 Jul 2021 07:01  -  22 Jul 2021 07:00  --------------------------------------------------------  IN:    Oral Fluid: 458 mL  Total IN: 458 mL    OUT:    Chest Tube (mL): 25 mL    Voided (mL): 500 mL  Total OUT: 525 mL    Total NET: -67 mL

## 2021-07-22 NOTE — PROGRESS NOTE ADULT - SUBJECTIVE AND OBJECTIVE BOX
Patient is a 89y old  Female who presents with a chief complaint of Frequent Falls, concern for Cellulitis. (2021 13:02)    SUBJECTIVE / OVERNIGHT EVENTS: Patient complaining of rib pain 9/10. Patient states breathing feels improved from day prior. Patient w/o fever or chills. ROS otherwise negative.     ADDITIONAL REVIEW OF SYSTEMS:    MEDICATIONS  (STANDING):  acetaminophen   Tablet .. 975 milliGRAM(s) Oral every 6 hours  albuterol/ipratropium for Nebulization. 3 milliLiter(s) Nebulizer once  amLODIPine   Tablet 10 milliGRAM(s) Oral daily  ampicillin/sulbactam  IVPB      ampicillin/sulbactam  IVPB 1.5 Gram(s) IV Intermittent every 6 hours  aspirin enteric coated 81 milliGRAM(s) Oral daily  lidocaine   Patch 1 Patch Transdermal daily  polyethylene glycol 3350 17 Gram(s) Oral daily  senna 2 Tablet(s) Oral at bedtime    MEDICATIONS  (PRN):  aluminum hydroxide/magnesium hydroxide/simethicone Suspension 30 milliLiter(s) Oral every 4 hours PRN Dyspepsia  melatonin 3 milliGRAM(s) Oral at bedtime PRN Insomnia    Vital Signs Last 24 Hrs  T(C): 36.6 (2021 06:45), Max: 36.8 (2021 13:45)  T(F): 97.9 (2021 06:45), Max: 98.2 (2021 13:45)  HR: 74 (2021 06:45) (63 - 77)  BP: 120/62 (2021 06:45) (108/57 - 129/60)  BP(mean): --  RR: 17 (2021 06:45) (16 - 20)  SpO2: 99% (2021 06:45) (95% - 100%)    GENERAL: NAD, elderly, frail, lethargic  HEAD:  Atraumatic, Normocephalic  EYES: EOMI, conjunctiva and sclera clear  NECK: Supple, No JVD  CHEST/LUNG: Clear to auscultation bilaterally; No wheeze. Comfortable on NC. Patient w/ chest tube to water seal.   HEART: Regular rate and rhythm; No murmurs, rubs, or gallops  ABDOMEN: Soft, Nontender, Nondistended; Bowel sounds present  EXTREMITIES:  2+ Peripheral Pulses, No clubbing, cyanosis, or edema. Right lower extremity w/ rash, dusky in appearance. Equal temperature to left lower extremity.   NEUROLOGY: non-focal, awake, answering questions.   SKIN: No rashes or lesions    LABS:                         11.0   5.34  )-----------( 126      ( 2021 08:06 )             35.4     07-22    138  |  103  |  37<H>  ----------------------------<  132<H>  4.3   |  23  |  1.08    Ca    8.6      2021 08:06  Phos  3.5     07-21  Mg     2.20     07-21                    RADIOLOGY, EKG & ADDITIONAL TESTS: Reviewed.       Urinalysis Basic - ( 2021 15:11 )    Color: Yellow / Appearance: Clear / S.026 / pH: x  Gluc: x / Ketone: Negative  / Bili: Negative / Urobili: 3 mg/dL   Blood: x / Protein: 30 mg/dL / Nitrite: Negative   Leuk Esterase: Negative / RBC: 3-5 /HPF / WBC 2 /HPF   Sq Epi: x / Non Sq Epi: 1 /HPF / Bacteria: Few        Culture - Urine (collected 2021 15:00)  Source: .Urine Clean Catch (Midstream)  Final Report (2021 13:53):    No growth    Culture - Blood (collected 2021 14:31)  Source: .Blood Blood-Peripheral  Preliminary Report (2021 15:05):    No growth to date.    Culture - Blood (collected 2021 14:31)  Source: .Blood Blood-Peripheral  Preliminary Report (2021 15:05):    No growth to date.        RADIOLOGY & ADDITIONAL TESTS:  Results Reviewed:   Imaging Personally Reviewed:  Electrocardiogram Personally Reviewed:    COORDINATION OF CARE:  Care Discussed with Consultants/Other Providers [Y/N]:  Prior or Outpatient Records Reviewed [Y/N]:   Patient is a 89y old  Female who presents with a chief complaint of Frequent Falls, concern for Cellulitis. (2021 13:02)    SUBJECTIVE / OVERNIGHT EVENTS: Patient complaining of rib pain 9/10. Patient states breathing feels improved from day prior. Patient w/o fever or chills. ROS otherwise negative.     ADDITIONAL REVIEW OF SYSTEMS:    MEDICATIONS  (STANDING):  acetaminophen   Tablet .. 975 milliGRAM(s) Oral every 6 hours  albuterol/ipratropium for Nebulization. 3 milliLiter(s) Nebulizer once  amLODIPine   Tablet 10 milliGRAM(s) Oral daily  ampicillin/sulbactam  IVPB      ampicillin/sulbactam  IVPB 1.5 Gram(s) IV Intermittent every 6 hours  aspirin enteric coated 81 milliGRAM(s) Oral daily  lidocaine   Patch 1 Patch Transdermal daily  polyethylene glycol 3350 17 Gram(s) Oral daily  senna 2 Tablet(s) Oral at bedtime    MEDICATIONS  (PRN):  aluminum hydroxide/magnesium hydroxide/simethicone Suspension 30 milliLiter(s) Oral every 4 hours PRN Dyspepsia  melatonin 3 milliGRAM(s) Oral at bedtime PRN Insomnia    Vital Signs Last 24 Hrs  T(C): 36.6 (2021 06:45), Max: 36.8 (2021 13:45)  T(F): 97.9 (2021 06:45), Max: 98.2 (2021 13:45)  HR: 74 (2021 06:45) (63 - 77)  BP: 120/62 (2021 06:45) (108/57 - 129/60)  BP(mean): --  RR: 17 (2021 06:45) (16 - 20)  SpO2: 99% (2021 06:45) (95% - 100%)    I&O's Summary    2021 07:01  -  2021 07:00  --------------------------------------------------------  IN: 458 mL / OUT: 525 mL / NET: -67 mL    GENERAL: NAD, elderly, frail, lethargic  HEAD:  Atraumatic, Normocephalic  EYES: EOMI, conjunctiva and sclera clear  NECK: Supple, No JVD  CHEST/LUNG: Clear to auscultation bilaterally; No wheeze. Comfortable on NC. Patient w/ chest tube to water seal.   HEART: Regular rate and rhythm; No murmurs, rubs, or gallops  ABDOMEN: Soft, Nontender, Nondistended; Bowel sounds present  EXTREMITIES:  2+ Peripheral Pulses, No clubbing, cyanosis, or edema. Right lower extremity w/ rash, dusky in appearance. Equal temperature to left lower extremity.   NEUROLOGY: non-focal, awake, answering questions.   SKIN: No rashes or lesions    LABS:                         11.0   5.34  )-----------( 126      ( 2021 08:06 )             35.4     07-22    138  |  103  |  37<H>  ----------------------------<  132<H>  4.3   |  23  |  1.08    Ca    8.6      2021 08:06  Phos  3.5     07-21  Mg     2.20     07-21                    RADIOLOGY, EKG & ADDITIONAL TESTS: Reviewed.       Urinalysis Basic - ( 2021 15:11 )    Color: Yellow / Appearance: Clear / S.026 / pH: x  Gluc: x / Ketone: Negative  / Bili: Negative / Urobili: 3 mg/dL   Blood: x / Protein: 30 mg/dL / Nitrite: Negative   Leuk Esterase: Negative / RBC: 3-5 /HPF / WBC 2 /HPF   Sq Epi: x / Non Sq Epi: 1 /HPF / Bacteria: Few        Culture - Urine (collected 2021 15:00)  Source: .Urine Clean Catch (Midstream)  Final Report (2021 13:53):    No growth    Culture - Blood (collected 2021 14:31)  Source: .Blood Blood-Peripheral  Preliminary Report (2021 15:05):    No growth to date.    Culture - Blood (collected 2021 14:31)  Source: .Blood Blood-Peripheral  Preliminary Report (2021 15:05):    No growth to date.    CT Chest:    No main, right, left, or lobar pulmonary embolism. Evaluation of segmental and subsegmental pulmonary arteries is limited .  Increased pericardial effusion, as above. Reflux of contrast into the IVC and hepatic veins may be secondary to impaired right heart filling. Correlate for clinical signs of pericardial constriction.  Increased small bilateral pleural effusions  Aortic valvular calcifications with aneurysmal dilatation of the ascending aorta, as above.

## 2021-07-22 NOTE — PROGRESS NOTE ADULT - ASSESSMENT
89 year old female with pericardial effusion s/p drain placement on 7/19    Plan:  continue drainage   monitor output  consider removal when output <10cc/24h

## 2021-07-22 NOTE — PROGRESS NOTE ADULT - SUBJECTIVE AND OBJECTIVE BOX
Patient seen and examined at bedside.    - called back to assess pleural effusion    Review of Systems:  REVIEW OF SYSTEMS:  CONSTITUTIONAL: No weakness, fevers or chills  EYES/ENT: No visual changes;  No dysphagia  NECK: No pain or stiffness  RESPIRATORY: No cough, wheezing, hemoptysis; No shortness of breath or orthopnea  CARDIOVASCULAR: No chest pain or palpitations; chronic lower ext edema  GASTROINTESTINAL: No abdominal or epigastric pain. No nausea, vomiting, or hematemesis; No diarrhea or constipation. No melena or hematochezia.  BACK: No back pain  GENITOURINARY: No dysuria, frequency or hematuria  NEUROLOGICAL: No numbness or weakness  SKIN: No itching, burning, rashes, or lesions   All other review of systems is negative unless indicated above.    [x ] All other systems negative  [ ] Unable to assess ROS due to    Current Meds:  acetaminophen   Tablet .. 975 milliGRAM(s) Oral every 6 hours  albuterol/ipratropium for Nebulization. 3 milliLiter(s) Nebulizer once  aluminum hydroxide/magnesium hydroxide/simethicone Suspension 30 milliLiter(s) Oral every 4 hours PRN  amLODIPine   Tablet 10 milliGRAM(s) Oral daily  ampicillin/sulbactam  IVPB      ampicillin/sulbactam  IVPB 1.5 Gram(s) IV Intermittent every 6 hours  aspirin enteric coated 81 milliGRAM(s) Oral daily  lidocaine   Patch 1 Patch Transdermal daily  melatonin 3 milliGRAM(s) Oral at bedtime PRN  polyethylene glycol 3350 17 Gram(s) Oral daily  senna 2 Tablet(s) Oral at bedtime      PAST MEDICAL & SURGICAL HISTORY:  HTN (Hypertension)    Squamous Cell Carcinoma of Skin of Face  removed from R cheek 4 years ago    Abnormality of femur        Vitals:  T(F): 97.9 (07-22), Max: 98.1 (07-21)  HR: 74 (07-22) (66 - 77)  BP: 120/62 (07-22) (108/57 - 124/64)  RR: 17 (07-22)  SpO2: 99% (07-22)  I&O's Summary    21 Jul 2021 07:01  -  22 Jul 2021 07:00  --------------------------------------------------------  IN: 458 mL / OUT: 525 mL / NET: -67 mL        Physical Exam:  Appearance: No acute distress; well appearing  Eyes: PERRL, EOMI, pink conjunctiva  HENT: Normal oral mucosa  Cardiovascular: RRR, S1, S2, no murmurs, rubs, or gallops; <1+ edema to knees; no JVD  Respiratory: mild crackles at bases L>R  Gastrointestinal: soft, non-tender, non-distended with normal bowel sounds  Musculoskeletal: No clubbing; no joint deformity   Neurologic: Non-focal  Lymphatic: No lymphadenopathy  Psychiatry: AAOx3, mood & affect appropriate  Skin: No rashes, ecchymoses, or cyanosis                          11.0   5.34  )-----------( 126      ( 22 Jul 2021 08:06 )             35.4     07-22    138  |  103  |  37<H>  ----------------------------<  132<H>  4.3   |  23  |  1.08    Ca    8.6      22 Jul 2021 08:06  Phos  3.5     07-21  Mg     2.20     07-21            Serum Pro-Brain Natriuretic Peptide: 951 pg/mL (07-16 @ 11:29)

## 2021-07-22 NOTE — PROGRESS NOTE ADULT - PROBLEM SELECTOR PLAN 7
DVT ppx: Lovenox SQ   Dispo: Pending hospital course  GOC: DNR/DNI, MOLST form completed, decision maker: patient. DVT ppx: Lovenox SQ   Dispo: Pending hospital course  GOC: DNR/DNI, MOLST form completed, decision maker: patient.    Attempted to update patient daughter w/o success DVT ppx: Lovenox SQ   Dispo: Pending hospital course  GOC: DNR/DNI, MOLST form completed, decision maker: patient.    Attempted to update patient daughter and w/o success on 7/22

## 2021-07-22 NOTE — PROGRESS NOTE ADULT - PROBLEM SELECTOR PLAN 1
In the setting of bilateral LE swelling, chronic venous stasis dermatitis.   R >L, increased redness and warmth of right LE. Redness improving, now more dusky. Also now not warm to the touch.  s/p Ceftriaxone, continue IV Unasyn for now (no MRSA risk factors)-> tentative last day abx 7/24  BCx NGTD   Doppler negative for DVT  #Constipation  - started on bowel regimen  - monitor BM

## 2021-07-22 NOTE — PROGRESS NOTE ADULT - PROBLEM SELECTOR PLAN 2
- patient w/ increasing dyspnea 7/21, placed on BIPAP. CXR w/ pleural effusions L>R. Patient s/p 20mg IV push of lasix 7/21. Patient now able to be placed NC however w/ some mild tachypnea.   - D-dimer elevated. CTA performed -> w/o evidence of PE  - daily VBG Acute respiratory failure 2/2 to pericardial tamponade.  - patient w/ increasing dyspnea 7/21, placed on BIPAP. CXR w/ pleural effusions L>R. Patient s/p 20mg IV push of lasix 7/21. Patient now able to be placed NC however w/ some mild tachypnea.   - D-dimer elevated. CTA performed -> w/o evidence of PE  - patient last echo-> normal left ventricular systolic function, large pericardial effusion, right ventricular diastolic collapse is present , consistent w/ tamponade.   - patient s/p pericardiocentesis w/ 700 cc drained now w/ chest tube to water seal   - f/u IR recs, f/u cards recs; patient w/ bilateral effusions unclear etiology, worsening EF after pericardiocentesis?   - will f/u repeat echo performed today to assess left ventricular function   - will get cards recs regarding additional IV lasix

## 2021-07-22 NOTE — PROGRESS NOTE ADULT - PROBLEM SELECTOR PLAN 5
CT Chest with large pericardial effusion. Asymptomatic, vitals stable.   ECHO w/ evidence of tamponade   S/p pericardiocentesis 700cc drained, chest tube to water seal.   Telemetry monitoring for now.   f/u further cardiology recs CT Chest with large pericardial effusion.   ECHO w/ evidence of tamponade   S/p pericardiocentesis 700cc drained, chest tube to water seal.   Telemetry monitoring for now.   f/u further cardiology recs CT Chest with large pericardial effusion.   ECHO w/ evidence of tamponade   S/p pericardiocentesis 700cc drained, chest tube to water seal.   Telemetry monitoring for now.   f/u further cardiology recs  IR recs: continue drainage; monitor output; consider removal when output <10cc/24h

## 2021-07-22 NOTE — PROGRESS NOTE ADULT - PROBLEM SELECTOR PLAN 4
Chest X-ray: Rt sided fractures, CT Chest: Acute 5th and 6th Rib fractures.  Pain control, Tylenol prn. Hot/Cold packs.   Incentive Spirometry. Chest X-ray: Rt sided fractures, CT Chest: Acute 5th and 6th Rib fractures.  Pain control, Hot/Cold packs.   Incentive Spirometry.  Tylenol changed from prn -> ATC for two days (975mg q 6 hours standing for 2 days)

## 2021-07-23 NOTE — PROGRESS NOTE ADULT - PROBLEM SELECTOR PLAN 5
CT Chest with large pericardial effusion.   Admission ECHO w/ evidence of tamponade   S/p pericardiocentesis 700cc drained, chest tube to water seal. Repeat echo w/ resolution of pericardial effusion.   Telemetry monitoring for now.   f/u further cardiology recs  IR recs: continue drainage; monitor output; consider removal when output <10cc/24h. Possible removal of chest tube today.

## 2021-07-23 NOTE — PROGRESS NOTE ADULT - SUBJECTIVE AND OBJECTIVE BOX
For all Cardiology service contact information, go to [email or web address] and use "[email or web address]" to login.    SUBJECTIVE:   No events overnight. Denies CP, SOB or Dyspnea.   -------------------------------------------------------------------------------------------  ROS:  CV: chest pain (-), palpitation (-), orthopnea (-), PND (-), edema (-)  PULM: SOB (-), cough (-), wheezing (-), hemoptysis (-).   CONST: fever (-), chills (-) or fatigue (-)  GI: abdominal distension (-), abdominal pain (-) , nausea/vomiting (-), hematemesis, (-), melena (-), hematochezia (-)  : dysuria (-), frequency (-), hematuria (-).   NEURO: numbness (-), weakness (-), dizziness (-)  SKIN: itching (-), rash (-)  HEENT:  visual changes (-); vertigo or throat pain (-);  neck stiffness (-)     All other review of systems is negative unless indicated above.   -------------------------------------------------------------------------------------------  VS:  T(F): 97.9 (07-23), Max: 98.3 (07-22)  HR: 76 (07-23) (67 - 97)  BP: 132/63 (07-23) (104/54 - 136/61)  RR: 17 (07-23)  SpO2: 99% (07-23)  I&O's Summary    22 Jul 2021 07:01  -  23 Jul 2021 07:00  --------------------------------------------------------  IN: 0 mL / OUT: 10 mL / NET: -10 mL      ------------------------------------------------------------------------------------------  PHYSICAL EXAM:  GENERAL: NAD  HEAD:  Atraumatic, Normocephalic.  EYES: EOMI, PERRLA, conjunctiva and sclera clear.  ENT: Moist mucous membranes.  NECK: Supple, No JVD.  CHEST/LUNG: Clear to auscultation bilaterally; No rales, rhonchi, wheezing, or rubs. Unlabored respirations.  HEART: Regular rate and rhythm; No murmurs, rubs, or gallops.  ABDOMEN: Bowel sounds present; Soft, Nontender, Nondistended.   EXTREMITIES:  2+ Peripheral Pulses, brisk capillary refill. No clubbing, cyanosis, or edema.  PSYCH: Normal affect.  SKIN: No rashes or lesions.  -------------------------------------------------------------------------------------------  LABS:                          10.5   5.16  )-----------( 137      ( 23 Jul 2021 07:11 )             33.4     07-22    138  |  103  |  37<H>  ----------------------------<  132<H>  4.3   |  23  |  1.08    Ca    8.6      22 Jul 2021 08:06        CARDIAC MARKERS ( 16 Jul 2021 18:24 )  31 ng/L / x     / x     / x     / x     / x      CARDIAC MARKERS ( 16 Jul 2021 11:29 )  34 ng/L / x     / x     / 40 U/L / x     / x                -------------------------------------------------------------------------------------------  Meds:  acetaminophen   Tablet .. 975 milliGRAM(s) Oral every 6 hours  albuterol/ipratropium for Nebulization. 3 milliLiter(s) Nebulizer once  aluminum hydroxide/magnesium hydroxide/simethicone Suspension 30 milliLiter(s) Oral every 4 hours PRN  amLODIPine   Tablet 10 milliGRAM(s) Oral daily  ampicillin/sulbactam  IVPB      ampicillin/sulbactam  IVPB 1.5 Gram(s) IV Intermittent every 6 hours  aspirin enteric coated 81 milliGRAM(s) Oral daily  furosemide    Tablet 20 milliGRAM(s) Oral daily  lidocaine   Patch 1 Patch Transdermal daily  melatonin 3 milliGRAM(s) Oral at bedtime PRN  polyethylene glycol 3350 17 Gram(s) Oral daily  senna 2 Tablet(s) Oral at bedtime    -------------------------------------------------------------------------------------------

## 2021-07-23 NOTE — PROGRESS NOTE ADULT - PROBLEM SELECTOR PLAN 1
In the setting of bilateral LE swelling, chronic venous stasis dermatitis.   R >L, increased redness and warmth of right LE. Redness improving, now more dusky. Also now not warm to the touch.  s/p Ceftriaxone, continue IV Unasyn for now (no MRSA risk factors)-> tentative last day abx 7/24  BCx NGTD   Doppler negative for DVT  #Constipation  - patient had BM constipation resolved

## 2021-07-23 NOTE — DIETITIAN INITIAL EVALUATION ADULT. - PERTINENT LABORATORY DATA
07-23 Na137 mmol/L Glu 100 mg/dL<H> K+ 4.6 mmol/L Cr  1.00 mg/dL BUN 36 mg/dL<H> 07-21 Phos 3.5 mg/dL 07-20 Alb 3.1 g/dL<L>

## 2021-07-23 NOTE — PROGRESS NOTE ADULT - ASSESSMENT
89F PMH of HTN, who presents to ED for repeated falls over the past week found to have 5th/6th rib fractures, pericardial effusion s/p pericardiocentesis, and cellulitis on antibiotics.     1. Pericardial effusion:   - currently hemodynamically stable, ~50 cc drainage reported since drain placement.   - drain care as per IR    2. Pleural effusion:    ***   89F PMH of HTN, who presents to ED for repeated falls over the past week found to have 5th/6th rib fractures, pericardial effusion s/p pericardiocentesis, and cellulitis on antibiotics.     1. Pericardial effusion:   - currently hemodynamically stable, ~50 cc drainage reported since drain placement   - echo 7/22 without any pericardial effusion  - drain care as per IR     2. Pleural effusion:     89F PMH of HTN, who presents to ED for repeated falls over the past week found to have 5th/6th rib fractures, pericardial effusion s/p pericardiocentesis, and cellulitis on antibiotics.     1. Pericardial effusion:   - currently hemodynamically stable, ~50 cc drainage reported since drain placement   - echo 7/22 without any pericardial effusion  - drain care as per IR     2. Pleural effusion:   - stable  - continue to monitor

## 2021-07-23 NOTE — DIETITIAN INITIAL EVALUATION ADULT. - OTHER INFO
RD visited with patient for length of stay nutrition assessment.  Patient confused and disoriented to situation. No family present @ bedside at time of RD visit.  Spoke to RN for collateral. Per RN, eating well, completed breakfast tray this AM.  Tolerating diet without reported chewing/swallowing difficulties. No GI distress reported i.e. nausea, vomiting, diarrhea.  Receiving bowel regimen at this time. Unable to obtain diet and weight hx at this time as patient a poor historian and unable to report information.

## 2021-07-23 NOTE — CHART NOTE - NSCHARTNOTEFT_GEN_A_CORE
: MD Roger and DO Jose De Jesus    INDICATION: Evaluation of pleural effusions    PROCEDURE:  [ ] LIMITED ECHO  [x] LIMITED CHEST  [ ] LIMITED RETROPERITONEAL  [ ] LIMITED ABDOMINAL  [ ] LIMITED DVT  [ ] NEEDLE GUIDANCE VASCULAR  [ ] NEEDLE GUIDANCE THORACENTESIS  [ ] NEEDLE GUIDANCE PARACENTESIS  [ ] NEEDLE GUIDANCE PERICARDIOCENTESIS  [ ] OTHER    FINDINGS:  Thoracic: Bilateral small pleural effusions.    INTERPRETATION:  Bilateral small pleural effusions. No safe window for thoracentesis.    Images stored in [facility]

## 2021-07-23 NOTE — DIETITIAN INITIAL EVALUATION ADULT. - PROBLEM SELECTOR PROBLEM 5
What Type Of Note Output Would You Prefer (Optional)?: Bullet Format
Hpi Title: Evaluation of Skin Lesions
How Severe Are Your Spot(S)?: moderate
Additional History: Declines chaperone  Lesion on nose rough
HTN (Hypertension)

## 2021-07-23 NOTE — PROGRESS NOTE ADULT - PROBLEM SELECTOR PLAN 2
Acute respiratory failure 2/2 to pericardial tamponade.  - patient w/ increasing dyspnea 7/21, placed on BIPAP. CXR w/ pleural effusions L>R. Patient s/p 20mg IV push of lasix 7/21. Patient now able to be placed NC however w/ some mild tachypnea.   - D-dimer elevated. CTA performed -> w/o evidence of PE  - patient last echo-> normal left ventricular systolic function, large pericardial effusion, right ventricular diastolic collapse is present , consistent w/ tamponade.   - patient s/p pericardiocentesis w/ 700 cc drained now w/ chest tube to water seal   - f/u IR recs, f/u cards recs; patient w/ bilateral effusions unclear etiology, pulm consulted for assistance--> unable to perform diagnostic thoracentesis effusions too small. Started po 20mg Lasix daily 7/22.  - repeat echo 7/22. W/ resolution of pericardial effusion. EF wnl.

## 2021-07-23 NOTE — PROGRESS NOTE ADULT - PROBLEM SELECTOR PLAN 4
Chest X-ray: Rt sided fractures, CT Chest: Acute 5th and 6th Rib fractures.  Pain control, Hot/Cold packs.   Incentive Spirometry.  Tylenol changed from prn -> ATC for two days (975mg q 6 hours standing for 2 days)

## 2021-07-23 NOTE — CHART NOTE - NSCHARTNOTEFT_GEN_A_CORE
Spoke with Hospitalist service at [facility] in [facility]. Patient was admitted in 4/2019 and had a pericardiocentesis at that time. Gram stain negative, AFB negative, fungal culture negative. Chart review revealed patient with a history of chronic pericardial effusion and no additional workup was performed at [facility] at that time. Called patient's primary care provider Dr. Mack Acevedo who states he does not have records from her cardiologist/ [facility] in regards to a prior pericardocentesis. The patient states her old primary care provider passed away and he knew most of her medical history.

## 2021-07-23 NOTE — DIETITIAN INITIAL EVALUATION ADULT. - PROBLEM SELECTOR PLAN 4
CT Chest with large pericardial effusion. Asymptomatic, vitals stable.   Follow up Echocardiogram.   Telemetry monitoring for now.   Cardiology consult.

## 2021-07-23 NOTE — GOALS OF CARE CONVERSATION - ADVANCED CARE PLANNING - CONVERSATION DETAILS
Spoke to patient regarding right lymph nodes seen on CT scan that this could be possibly related to breast cancer. Patient states she would not want this worked up at her age. That even if we found cancer she would not want it treated. Mentioned she had a nodule in her breast prior that she opted not for workup and only surveillance (believes it was right breast). Patient does not want aggressive measures. Will not pursue workup further of lymph nodes given not within GOC.

## 2021-07-23 NOTE — DIETITIAN INITIAL EVALUATION ADULT. - CHIEF COMPLAINT
A 89F PMH of HTN, who presents to ED for repeated falls over the past week found to have 5th/6th rib fractures, incidental pericardial effusion, and cellulitis- admitted for pain control and IV Antibiotics. Patient noted to be started on bowel regimen for constipation. English

## 2021-07-23 NOTE — DIETITIAN INITIAL EVALUATION ADULT. - PERTINENT MEDS FT
MEDICATIONS  (STANDING):  acetaminophen   Tablet .. 975 milliGRAM(s) Oral every 6 hours  albuterol/ipratropium for Nebulization. 3 milliLiter(s) Nebulizer once  amLODIPine   Tablet 10 milliGRAM(s) Oral daily  ampicillin/sulbactam  IVPB      ampicillin/sulbactam  IVPB 1.5 Gram(s) IV Intermittent every 6 hours  aspirin enteric coated 81 milliGRAM(s) Oral daily  furosemide    Tablet 20 milliGRAM(s) Oral daily  lidocaine   Patch 1 Patch Transdermal daily  polyethylene glycol 3350 17 Gram(s) Oral daily  senna 2 Tablet(s) Oral at bedtime    MEDICATIONS  (PRN):  aluminum hydroxide/magnesium hydroxide/simethicone Suspension 30 milliLiter(s) Oral every 4 hours PRN Dyspepsia  melatonin 3 milliGRAM(s) Oral at bedtime PRN Insomnia

## 2021-07-23 NOTE — CONSULT NOTE ADULT - ATTENDING COMMENTS
I personally reviewed the echo. There is clear evidence of pericardial tamponade.  Despite peripheral edema (likely the result of constriction from effusion) would aggressively hydrate. The patient is agreeable to pericardiocentesis. Would involve IR to perform procedure tomorrow. Suspect will need pericardial window, though I'm not sure that's consistent with her goals of care.     For now, there is no hemodynamic instability. I have instructed the team to follow up on her in a few hours, but if she remains stable, hydration should suffice until drainage tomorrow.
89F PMH of HTN, HLD, chronic venous stasis, recent multiple falls found to have cellulitis, rib fractures presents with SOB, found to have increasing pericardial fluid - hemodynamically stable, saturating well on 4L oxygen NC, plan for possible pericardiocentesis for tamponade physiology.    Hypercapnia is likely due to rib fractures which is causing stenting. Patient is altered likely from hypercapnia which is confirmed by the daughter who is at bedside. Given the mental status change and hypercapnia would suggest bilevel 10/5 cmH2O as tolerated. If she cannot tolerate bilevel, then would suggest pain control to the rib fractures (without the use of opioids). Bilevel is an aggressive measure for her hypercapnia but we want to ensure that the patient is optimized for her pericardiocentesis in the AM.     Once the procedure is done can discontinue bilevel and do local pain control. Please note that the bilevel is not emergent, this is just to optimize the patient for her procedure in the morning.     This information was given to the patient's daughter at bedside.     No indication for MICU at this time. Please call back if further questions arise.
89 year-old F with PMH HTN, HLD, chronic venous stasis, and pericardial effusion who presented to the hospital with falls. Course complicated by hypercapnic respiratory failure now s/p pericardiocentesis with drain in place. Pulmonology consulted for evaluation for thoracentesis given pleural effusions seen on CXR.    CT scan, CXR, and ultrasound reviewed. Pleural effusions are present bilaterally but as small and too small to undergo a thoracentesis. Patient does not endorse any dyspnea, cough or sputum production. She is still alert and oriented as she was several days ago when I saw her for a ICU consult. No further intervention is needed at this time. Continue diuresis as per cardiology.     Thank you for your consult. Please call back if you have further questions regarding the care of this patient.

## 2021-07-23 NOTE — PROGRESS NOTE ADULT - PROBLEM SELECTOR PLAN 7
DVT ppx: Lovenox SQ   Dispo: Pending hospital course  GOC: DNR/DNI, MOLST form completed, decision maker: patient.    Attempted to update patient daughter and w/o success on 7/23 DVT ppx: Lovenox SQ   Dispo: Pending hospital course  GOC: DNR/DNI, MOLST form completed, decision maker: patient.    Attempted to update patient daughter and w/o on 7/23  Attempted to update patient son w/o success on 7/23 DVT ppx: Lovenox SQ   Dispo: Pending hospital course  GOC: DNR/DNI, MOLST form completed, decision maker: patient.    Attempted to update patient daughter and w/o on 7/23  Attempted to update patient son w/o success on 7/23  Updated Grandson 7/23     Patient DNR/DNI concern for underlying malignancy. Patient with right axillary lymph nodes. Possible right breast malignancy? Spoke to grandson regarding possible workup for these lymph nodes. Patient 89 years old. Explained to grandson biopsy would not be pursued if treatment of underlying malignancy is not within GOC. Family to discuss. Will f/u on discussions.

## 2021-07-23 NOTE — PROGRESS NOTE ADULT - ATTENDING COMMENTS
While she does have bilateral pleural effusions and crackles, her cardiac exam does not point to CHF or tamponade physiology as the primary etiology of her pleural effusions. She does not have any orthopnea and her JVP appears to be normal to mildly elevated    -check repeat TTE  -Recommend low-dose PO maintenance diuretic as above and avoid aggressive diuretics in the setting of hypercapnea (?2/2 rib fx or pleffs)  -wean O2  -if within goals of care consider diagnostic thoracentesis; if transudative and TTE without concerning findings can increase dose of diuretic to furosemide 40mg PO daily
I discussed the case with the NP. I saw her earlier in the day but not at the time of this note.
While she does have bilateral pleural effusions and crackles, her cardiac exam does not point to CHF or tamponade physiology as the primary etiology of her pleural effusions. She does not have any orthopnea and her JVP appears to be normal to mildly elevated    -repeat TTE without pericardial effusion  -continue furosemide 20mg daily  -Recommend low-dose PO maintenance diuretic as above and avoid aggressive diuretics in the setting of hypercapnea (?2/2 rib fx or pleffs)  -wean O2  -if within goals of care consider diagnostic thoracentesis

## 2021-07-23 NOTE — CONSULT NOTE ADULT - SUBJECTIVE AND OBJECTIVE BOX
CC: Frequent falls    HPI:  HPI: History obtained directly from patient who is alert/oriented x4. A 89F PMH of HTN, HLD, Chronic Venous Stasis Dermatitis, Pericardial Effusion who presents to ED for repeated falls over the past week, last fall last night, pt states she lost her footing and landed on the couch, was able to right herself up but was unable to get out of bed, pt has been feeling weak and having leg weakness x weeks. Pt also endorses L ankle pain since the fall last night, as per daughter in room pt had wound which was being treated on R leg but unsure of recent swelling. Pt denies any recent/current symptoms of shortness of breath, chest pain, abdominal pain, nausea, vomiting, dizziness, paplitations, urinary frequency, denies fevers/chills, nausea/vomiting. Patient states that she is independent in most activities of daily living and ambulates with a cane and at times "crawls" up the stairs to get to the 2nd level. Receives HHA 4 hours a day , 7 days week. (16 Jul 2021 18:15)    Patient seen and examined at bedside. Patient is s/p pericardial drain placement by IR. Pulmonology was consulted for evaluation of pleural effusion seen on CXR. Patient reports that she feels well, denies shortness of breath, cough, and chest pain. She does endorse some dizziness this morning. She reports that she was unable to eat much yesterday but that her appetite is back today.      PAST MEDICAL & SURGICAL HISTORY:  HTN (Hypertension)    Squamous Cell Carcinoma of Skin of Face  removed from R cheek 4 years ago    Abnormality of femur        FAMILY HISTORY:  No pertinent family history in first degree relatives        SOCIAL HISTORY:  Smoking: Smoked "in the 1950s," quit after that  EtOH Use: None  Occupation: Retired hairdresser  Exposures: None reported  Recent Travel: None reported    Allergies    No Known Allergies    Intolerances    HOME MEDICATIONS:    REVIEW OF SYSTEMS:  Constitutional: No fevers or chills.   Eyes: No itching or discharge from the eyes  ENT: No ear pain. No ear discharge. No nasal congestion.   CV: No chest pain. No palpitations.   Resp: No dyspnea at rest. No cough.  GI: No nausea. No vomiting. No diarrhea.  MSK: No joint pain or pain in any extremities  Integumentary: No skin lesions. No pedal edema.  Neurological: No gross motor weakness. No sensory changes.  [x] All other systems negative  [ ] Unable to assess ROS because ________    OBJECTIVE:  ICU Vital Signs Last 24 Hrs  T(C): 36.6 (23 Jul 2021 06:30), Max: 36.8 (22 Jul 2021 21:15)  T(F): 97.9 (23 Jul 2021 06:30), Max: 98.3 (22 Jul 2021 21:15)  HR: 76 (23 Jul 2021 06:30) (67 - 97)  BP: 132/63 (23 Jul 2021 06:30) (104/54 - 136/61)  BP(mean): --  ABP: --  ABP(mean): --  RR: 17 (23 Jul 2021 06:30) (17 - 17)  SpO2: 99% (23 Jul 2021 06:30) (96% - 100%)    07-22 @ 07:01  -  07-23 @ 07:00  --------------------------------------------------------  IN: 0 mL / OUT: 10 mL / NET: -10 mL    CAPILLARY BLOOD GLUCOSE    PHYSICAL EXAM:  General: Awake and alert, pleasant.  HEENT: Atraumatic, normocephalic.   Lymph Nodes: No palpable lymphadenopathy  Neck: Supple.  Respiratory: Normal chest expansion. Clear anteriorly, decreased BS at the bases.  Cardiovascular: S1 S2 normal. No murmurs, rubs or gallops.   Abdomen: Soft, non-tender, non-distended. No organomegaly.  Extremities: Warm to touch. Peripheral pulse palpable. No pedal edema.   Skin: No rashes or skin lesions  Neurological: Motor and sensory examination equal and normal in all four extremities.  Psychiatry: Appropriate mood and affect.    HOSPITAL MEDICATIONS:  MEDICATIONS  (STANDING):  acetaminophen   Tablet .. 975 milliGRAM(s) Oral every 6 hours  albuterol/ipratropium for Nebulization. 3 milliLiter(s) Nebulizer once  amLODIPine   Tablet 10 milliGRAM(s) Oral daily  ampicillin/sulbactam  IVPB      ampicillin/sulbactam  IVPB 1.5 Gram(s) IV Intermittent every 6 hours  aspirin enteric coated 81 milliGRAM(s) Oral daily  furosemide    Tablet 20 milliGRAM(s) Oral daily  lidocaine   Patch 1 Patch Transdermal daily  polyethylene glycol 3350 17 Gram(s) Oral daily  senna 2 Tablet(s) Oral at bedtime    MEDICATIONS  (PRN):  aluminum hydroxide/magnesium hydroxide/simethicone Suspension 30 milliLiter(s) Oral every 4 hours PRN Dyspepsia  melatonin 3 milliGRAM(s) Oral at bedtime PRN Insomnia      LABS:                        10.5   5.16  )-----------( 137      ( 23 Jul 2021 07:11 )             33.4     07-23    137  |  101  |  36<H>  ----------------------------<  100<H>  4.6   |  27  |  1.00    Ca    8.1<L>      23 Jul 2021 07:11            Venous Blood Gas:  07-22 @ 08:06  7.28/68/36/27/65.5  VBG Lactate: --      MICROBIOLOGY:     RADIOLOGY:  [x] Reviewed and interpreted by me,  CXR 7/21: Bilateral pleural effusions, L>R    Point of Care Ultrasound Findings: Please see separate POCUS note    PFT:    EKG:

## 2021-07-23 NOTE — PROGRESS NOTE ADULT - SUBJECTIVE AND OBJECTIVE BOX
Patient is a 89y old  Female who presents with a chief complaint of Frequent Falls, concern for Cellulitis. (2021 13:02)    SUBJECTIVE / OVERNIGHT EVENTS: Patient states right rib pain is improved. Patient states breathing improved from day prior. Patient w/o fever or chills. ROS otherwise negative.     MEDICATIONS  (STANDING):  acetaminophen   Tablet .. 975 milliGRAM(s) Oral every 6 hours  albuterol/ipratropium for Nebulization. 3 milliLiter(s) Nebulizer once  amLODIPine   Tablet 10 milliGRAM(s) Oral daily  ampicillin/sulbactam  IVPB      ampicillin/sulbactam  IVPB 1.5 Gram(s) IV Intermittent every 6 hours  aspirin enteric coated 81 milliGRAM(s) Oral daily  furosemide    Tablet 20 milliGRAM(s) Oral daily  lidocaine   Patch 1 Patch Transdermal daily  polyethylene glycol 3350 17 Gram(s) Oral daily  senna 2 Tablet(s) Oral at bedtime    MEDICATIONS  (PRN):  aluminum hydroxide/magnesium hydroxide/simethicone Suspension 30 milliLiter(s) Oral every 4 hours PRN Dyspepsia  melatonin 3 milliGRAM(s) Oral at bedtime PRN Insomnia    Vital Signs Last 24 Hrs  T(C): 36.6 (2021 06:30), Max: 36.8 (2021 21:15)  T(F): 97.9 (2021 06:30), Max: 98.3 (2021 21:15)  HR: 76 (2021 06:30) (67 - 81)  BP: 132/63 (2021 06:30) (127/71 - 136/61)  BP(mean): --  RR: 17 (2021 06:30) (17 - 17)  SpO2: 99% (2021 06:30) (96% - 100%)    I&O's Summary    2021 07:01  -  2021 07:00  --------------------------------------------------------  IN: 0 mL / OUT: 10 mL / NET: -10 mL    GENERAL: NAD, elderly, frail, lethargic  HEAD:  Atraumatic, Normocephalic  EYES: EOMI, conjunctiva and sclera clear  NECK: Supple, No JVD  CHEST/LUNG: Clear to auscultation bilaterally; No wheeze. Comfortable on NC. Patient w/ chest tube to water seal.   HEART: Regular rate and rhythm; No murmurs, rubs, or gallops  ABDOMEN: Soft, Nontender, Nondistended; Bowel sounds present  EXTREMITIES:  2+ Peripheral Pulses, No clubbing, cyanosis, or edema. Right lower extremity w/ rash, dusky in appearance. Equal temperature to left lower extremity.   NEUROLOGY: non-focal, awake, answering questions.   SKIN: No rashes or lesions    LABS:                         10.5   5.16  )-----------( 137      ( 2021 07:11 )             33.4         137  |  101  |  36<H>  ----------------------------<  100<H>  4.6   |  27  |  1.00    Ca    8.1<L>      2021 07:11                    RADIOLOGY, EKG & ADDITIONAL TESTS: Reviewed.       Urinalysis Basic - ( 2021 15:11 )    Color: Yellow / Appearance: Clear / S.026 / pH: x  Gluc: x / Ketone: Negative  / Bili: Negative / Urobili: 3 mg/dL   Blood: x / Protein: 30 mg/dL / Nitrite: Negative   Leuk Esterase: Negative / RBC: 3-5 /HPF / WBC 2 /HPF   Sq Epi: x / Non Sq Epi: 1 /HPF / Bacteria: Few        Culture - Urine (collected 2021 15:00)  Source: .Urine Clean Catch (Midstream)  Final Report (2021 13:53):    No growth    Culture - Blood (collected 2021 14:31)  Source: .Blood Blood-Peripheral  Preliminary Report (2021 15:05):    No growth to date.    Culture - Blood (collected 2021 14:31)  Source: .Blood Blood-Peripheral  Preliminary Report (2021 15:05):    No growth to date.    CT Chest:    No main, right, left, or lobar pulmonary embolism. Evaluation of segmental and subsegmental pulmonary arteries is limited .  Increased pericardial effusion, as above. Reflux of contrast into the IVC and hepatic veins may be secondary to impaired right heart filling. Correlate for clinical signs of pericardial constriction.  Increased small bilateral pleural effusions  Aortic valvular calcifications with aneurysmal dilatation of the ascending aorta, as above.

## 2021-07-23 NOTE — DIETITIAN INITIAL EVALUATION ADULT. - ORAL NUTRITION SUPPLEMENTS
May consider Ensure Compact (220kcal, 9gm protein / 4 FL OZ) if patient exhibits diminished appetite and further increase as needed if well tolerated and accepted.

## 2021-07-23 NOTE — CONSULT NOTE ADULT - ASSESSMENT
89 year-old F with PMH HTN, HLD, chronic venous stasis, and pericardial effusion who presented to the hospital with falls. Course complicated by hypercapnic respiratory failure now s/p pericardiocentesis with drain in place. Pulmonology consulted for evaluation for thoracentesis given pleural effusions seen on CXR.    Pleural Effusions  -    Bubba Duran, PGY-6  Pulmonary and Critical Care Medicine  [phone] 89 year-old F with PMH HTN, HLD, chronic venous stasis, and pericardial effusion who presented to the hospital with falls. Course complicated by hypercapnic respiratory failure now s/p pericardiocentesis with drain in place. Pulmonology consulted for evaluation for thoracentesis given pleural effusions seen on CXR.    Pleural Effusions  - Effusions are bilateral and small, no safe window for diagnostic thoracentesis  - Would continue diuretics per cardiology/primary team  - Pericardial drain care as per IR  - Wean O2 as tolerated    Pulmonary will sign off. Please call with questions    Bubba Duran, PGY-6  Pulmonary and Critical Care Medicine  [phone]

## 2021-07-24 NOTE — PROGRESS NOTE ADULT - PROBLEM SELECTOR PLAN 1
In the setting of bilateral LE swelling, chronic venous stasis dermatitis.   R >L, increased redness and warmth of right LE. Redness improving, now more dusky. Also now not warm to the touch.  s/p Ceftriaxone, continue IV Unasyn for 7 days-> last day today  BCx NGTD   Doppler negative for DVT

## 2021-07-24 NOTE — PROGRESS NOTE ADULT - SUBJECTIVE AND OBJECTIVE BOX
[facility] Division of Hospital Medicine  Avila Whitehead MD  Pager [phone]      Patient is a 89y old  Female who presents with a chief complaint of Frequent Falls, concern for Cellulitis. (23 Jul 2021 15:18)      SUBJECTIVE / OVERNIGHT EVENTS:    No fever o/n. Pt reports mild SOB now on O2 3L satting mid 90s. Minimal output from pericardial drain. Denies chest pain    ADDITIONAL REVIEW OF SYSTEMS:    RESPIRATORY: No cough, wheezing, chills or hemoptysis; +shortness of breath  CARDIOVASCULAR: No chest pain, palpitations, dizziness, or leg swelling  GASTROINTESTINAL: No abdominal or epigastric pain. No nausea, vomiting, or hematemesis; No diarrhea or constipation. No melena or hematochezia.      MEDICATIONS  (STANDING):  albuterol/ipratropium for Nebulization. 3 milliLiter(s) Nebulizer once  amLODIPine   Tablet 10 milliGRAM(s) Oral daily  ampicillin/sulbactam  IVPB      ampicillin/sulbactam  IVPB 1.5 Gram(s) IV Intermittent every 6 hours  aspirin enteric coated 81 milliGRAM(s) Oral daily  furosemide    Tablet 20 milliGRAM(s) Oral daily  lidocaine   Patch 1 Patch Transdermal daily  polyethylene glycol 3350 17 Gram(s) Oral daily  senna 2 Tablet(s) Oral at bedtime    MEDICATIONS  (PRN):  aluminum hydroxide/magnesium hydroxide/simethicone Suspension 30 milliLiter(s) Oral every 4 hours PRN Dyspepsia  melatonin 3 milliGRAM(s) Oral at bedtime PRN Insomnia      CAPILLARY BLOOD GLUCOSE        I&O's Summary      PHYSICAL EXAM:  Vital Signs Last 24 Hrs  T(C): 36.6 (24 Jul 2021 11:49), Max: 36.7 (24 Jul 2021 05:00)  T(F): 97.8 (24 Jul 2021 11:49), Max: 98 (24 Jul 2021 05:00)  HR: 68 (24 Jul 2021 11:49) (68 - 74)  BP: 120/61 (24 Jul 2021 11:49) (120/61 - 121/62)  BP(mean): --  RR: 18 (24 Jul 2021 11:49) (17 - 18)  SpO2: 95% (24 Jul 2021 11:49) (93% - 99%)    CONSTITUTIONAL: NAD,  EYES: PERRLA; conjunctiva and sclera clear  ENMT: Moist oral mucosa, no pharyngeal injection or exudates;   NECK: Supple, no palpable masses;  RESPIRATORY: Normal respiratory effort; lungs are clear to auscultation bilaterally  CARDIOVASCULAR: Regular rate and rhythm, normal S1 and S2, no murmur/rub/gallop; trace lower extremity edema; Peripheral pulses are 2+ bilaterally. Pericardial drain in place  ABDOMEN: Nontender to palpation, normoactive bowel sounds, no rebound/guarding;   MUSCLOSKELETAL:   no clubbing or cyanosis of digits; no joint swelling or tenderness to palpation  PSYCH: A+O to person, place, and time; affect appropriate  NEUROLOGY: CN 2-12 are intact and symmetric; no gross sensory deficits;   SKIN: No rashes;     LABS:                        11.6   5.57  )-----------( 128      ( 24 Jul 2021 08:33 )             36.5     07-24    139  |  102  |  34<H>  ----------------------------<  107<H>  4.7   |  26  |  1.01    Ca    8.9      24 Jul 2021 08:33  Phos  3.1     07-24  Mg     2.10     07-24                  RADIOLOGY & ADDITIONAL TESTS:  Results Reviewed:   Imaging Personally Reviewed:  Electrocardiogram Personally Reviewed:    COORDINATION OF CARE:  Care Discussed with Consultants/Other Providers [Y/N]:  Prior or Outpatient Records Reviewed [Y/N]:

## 2021-07-25 NOTE — PROGRESS NOTE ADULT - PROBLEM SELECTOR PLAN 1
In the setting of bilateral LE swelling, chronic venous stasis dermatitis.   R >L, increased redness and warmth of right LE. Redness improving, now more dusky. Also now not warm to the touch.  s/p Ceftriaxone, s/p IV Unasyn for 7 days. monitor off abx  BCx NGTD   Doppler negative for DVT

## 2021-07-25 NOTE — PROGRESS NOTE ADULT - PROBLEM SELECTOR PLAN 2
Acute respiratory failure 2/2 to pericardial tamponade.  - patient w/ increasing dyspnea 7/21, placed on BIPAP. CXR w/ pleural effusions L>R. Patient s/p 20mg IV push of lasix 7/21. Patient now able to be placed NC however w/ some mild tachypnea.   - D-dimer elevated. CTA performed -> w/o evidence of PE  - patient last echo-> normal left ventricular systolic function, large pericardial effusion, right ventricular diastolic collapse is present , consistent w/ tamponade.   - patient s/p pericardiocentesis w/ 700 cc drained now w/ chest tube to water seal   - f/u IR recs, f/u cards recs; patient w/ bilateral effusions unclear etiology, pulm consulted for assistance--> unable to perform diagnostic thoracentesis effusions too small. Started po 20mg Lasix daily 7/22.  - repeat echo 7/22. W/ resolution of pericardial effusion. EF wnl.  - appears more tachypneic today. will obtain CXR to assess fluid status

## 2021-07-25 NOTE — PROGRESS NOTE ADULT - SUBJECTIVE AND OBJECTIVE BOX
[facility] Division of [facility] Medicine  Avila Whitehead MD  Pager [phone]      Patient is a 89y old  Female who presents with a chief complaint of Frequent Falls, concern for Cellulitis. (24 Jul 2021 12:12)      SUBJECTIVE / OVERNIGHT EVENTS:    no fever o/n. pt appears more tachypneic today. c/o sob. denies chest pain     ADDITIONAL REVIEW OF SYSTEMS:    RESPIRATORY: No cough, wheezing, chills or hemoptysis; + shortness of breath  CARDIOVASCULAR: No chest pain, palpitations, dizziness, or leg swelling  GASTROINTESTINAL: No abdominal or epigastric pain. No nausea, vomiting, or hematemesis; No diarrhea or constipation. No melena or hematochezia.    MEDICATIONS  (STANDING):  albuterol/ipratropium for Nebulization. 3 milliLiter(s) Nebulizer once  amLODIPine   Tablet 10 milliGRAM(s) Oral daily  aspirin enteric coated 81 milliGRAM(s) Oral daily  furosemide    Tablet 20 milliGRAM(s) Oral daily  lidocaine   Patch 1 Patch Transdermal daily  polyethylene glycol 3350 17 Gram(s) Oral daily  senna 2 Tablet(s) Oral at bedtime    MEDICATIONS  (PRN):  aluminum hydroxide/magnesium hydroxide/simethicone Suspension 30 milliLiter(s) Oral every 4 hours PRN Dyspepsia  melatonin 3 milliGRAM(s) Oral at bedtime PRN Insomnia      CAPILLARY BLOOD GLUCOSE        I&O's Summary    24 Jul 2021 07:01  -  25 Jul 2021 07:00  --------------------------------------------------------  IN: 0 mL / OUT: 0 mL / NET: 0 mL        PHYSICAL EXAM:  Vital Signs Last 24 Hrs  T(C): 36.4 (25 Jul 2021 05:23), Max: 36.7 (24 Jul 2021 21:25)  T(F): 97.6 (25 Jul 2021 05:23), Max: 98 (24 Jul 2021 21:25)  HR: 72 (25 Jul 2021 05:23) (68 - 80)  BP: 125/67 (25 Jul 2021 05:23) (120/61 - 132/60)  BP(mean): --  RR: 18 (25 Jul 2021 05:23) (18 - 18)  SpO2: 94% (25 Jul 2021 05:23) (93% - 96%)    CONSTITUTIONAL: NAD,  EYES: PERRLA; conjunctiva and sclera clear  ENMT: Moist oral mucosa, no pharyngeal injection or exudates;   NECK: Supple, no palpable masses;  RESPIRATORY: Normal respiratory effort; b/l crackles bilaterally  CARDIOVASCULAR: Regular rate and rhythm, normal S1 and S2, no murmur/rub/gallop; No lower extremity edema; Peripheral pulses are 2+ bilaterally  ABDOMEN: Nontender to palpation, normoactive bowel sounds, no rebound/guarding;   MUSCLOSKELETAL:   no clubbing or cyanosis of digits; no joint swelling or tenderness to palpation  PSYCH: A+O to person, place, and time; affect appropriate  NEUROLOGY: CN 2-12 are intact and symmetric; no gross sensory deficits;   SKIN: chronic LE venous stasis changes     LABS:                        11.0   5.00  )-----------( 146      ( 25 Jul 2021 06:51 )             34.3     07-25    139  |  99  |  26<H>  ----------------------------<  123<H>  4.6   |  31  |  0.80    Ca    9.0      25 Jul 2021 06:45  Phos  2.9     07-25  Mg     2.10     07-25                  RADIOLOGY & ADDITIONAL TESTS:  Results Reviewed:   Imaging Personally Reviewed:  Electrocardiogram Personally Reviewed:    COORDINATION OF CARE:  Care Discussed with Consultants/Other Providers [Y/N]:  Prior or Outpatient Records Reviewed [Y/N]:

## 2021-07-26 NOTE — PROGRESS NOTE ADULT - PROBLEM SELECTOR PLAN 4
Chest X-ray: Rt sided fractures, CT Chest: Acute 5th and 6th Rib fractures.  Pain control, Hot/Cold packs.   Incentive Spirometry.  Tylenol changed from prn -> ATC for two days (975mg q 6 hours standing for 2 days)  add lidocaine patch to the R chest

## 2021-07-26 NOTE — PROGRESS NOTE ADULT - SUBJECTIVE AND OBJECTIVE BOX
[facility] Division of [facility] Medicine  Avila Whitehead MD  Pager [phone]      Patient is a 89y old  Female who presents with a chief complaint of Frequent Falls, concern for Cellulitis. (25 Jul 2021 11:16)      SUBJECTIVE / OVERNIGHT EVENTS:    No fever o/n. Pt appears tachypneic but denies feeling sob. reports mild pain at the R chest rib fracture site.     ADDITIONAL REVIEW OF SYSTEMS:    RESPIRATORY: No cough, wheezing, chills or hemoptysis; No shortness of breath  CARDIOVASCULAR: No chest pain, palpitations, dizziness, or leg swelling  GASTROINTESTINAL: No abdominal or epigastric pain. No nausea, vomiting, or hematemesis; No diarrhea or constipation. No melena or hematochezia.      MEDICATIONS  (STANDING):  albuterol/ipratropium for Nebulization. 3 milliLiter(s) Nebulizer once  amLODIPine   Tablet 10 milliGRAM(s) Oral daily  aspirin enteric coated 81 milliGRAM(s) Oral daily  furosemide    Tablet 20 milliGRAM(s) Oral daily  lidocaine   Patch 1 Patch Transdermal daily  polyethylene glycol 3350 17 Gram(s) Oral daily  senna 2 Tablet(s) Oral at bedtime    MEDICATIONS  (PRN):  aluminum hydroxide/magnesium hydroxide/simethicone Suspension 30 milliLiter(s) Oral every 4 hours PRN Dyspepsia  melatonin 3 milliGRAM(s) Oral at bedtime PRN Insomnia      CAPILLARY BLOOD GLUCOSE        I&O's Summary    25 Jul 2021 07:01  -  26 Jul 2021 07:00  --------------------------------------------------------  IN: 500 mL / OUT: 1000 mL / NET: -500 mL        PHYSICAL EXAM:  Vital Signs Last 24 Hrs  T(C): 36.7 (26 Jul 2021 11:45), Max: 36.7 (25 Jul 2021 21:00)  T(F): 98 (26 Jul 2021 11:45), Max: 98.1 (26 Jul 2021 05:00)  HR: 75 (26 Jul 2021 11:45) (72 - 82)  BP: 135/70 (26 Jul 2021 11:45) (118/73 - 141/72)  BP(mean): --  RR: 16 (26 Jul 2021 11:45) (16 - 19)  SpO2: 96% (26 Jul 2021 11:45) (92% - 100%)    CONSTITUTIONAL: NAD,  EYES: PERRLA; conjunctiva and sclera clear  ENMT: Moist oral mucosa, no pharyngeal injection or exudates;   NECK: Supple, no palpable masses;  RESPIRATORY: Normal respiratory effort; b/l crackles bilaterally  CARDIOVASCULAR: Regular rate and rhythm, normal S1 and S2, no murmur/rub/gallop; No lower extremity edema; Peripheral pulses are 2+ bilaterally. pericardial drain in place   ABDOMEN: Nontender to palpation, normoactive bowel sounds, no rebound/guarding;   MUSCLOSKELETAL:   no clubbing or cyanosis of digits; no joint swelling or tenderness to palpation  PSYCH: A+O to person, place, and time; affect appropriate  NEUROLOGY: CN 2-12 are intact and symmetric; no gross sensory deficits;   SKIN: chronic LE venous stasis changes     LABS:                        10.6   5.64  )-----------( 155      ( 26 Jul 2021 07:56 )             32.8     07-26    134<L>  |  97<L>  |  22  ----------------------------<  111<H>  4.4   |  29  |  0.79    Ca    8.9      26 Jul 2021 07:56  Phos  3.1     07-26  Mg     2.10     07-26                  RADIOLOGY & ADDITIONAL TESTS:  Results Reviewed:   Imaging Personally Reviewed:  Electrocardiogram Personally Reviewed:    COORDINATION OF CARE:  Care Discussed with Consultants/Other Providers [Y/N]:  Prior or Outpatient Records Reviewed [Y/N]:

## 2021-07-26 NOTE — PROGRESS NOTE ADULT - PROBLEM SELECTOR PLAN 5
CT Chest with large pericardial effusion.   Admission ECHO w/ evidence of tamponade   S/p pericardiocentesis 700cc drained, chest tube to water seal. Repeat echo w/ resolution of pericardial effusion.   Telemetry monitoring for now.   f/u further cardiology recs  monitor output from pericardial drain. f/u IR when to dc

## 2021-07-26 NOTE — PROGRESS NOTE ADULT - PROBLEM SELECTOR PLAN 2
Acute respiratory failure 2/2 to pericardial tamponade.  - patient w/ increasing dyspnea 7/21, placed on BIPAP. CXR w/ pleural effusions L>R. Patient s/p 20mg IV push of lasix 7/21. Patient now able to be placed NC however w/ some mild tachypnea.   - D-dimer elevated. CTA performed -> w/o evidence of PE  - patient last echo-> normal left ventricular systolic function, large pericardial effusion, right ventricular diastolic collapse is present , consistent w/ tamponade.   - patient s/p pericardiocentesis w/ 700 cc drained now w/ chest tube to water seal   - f/u IR recs, f/u cards recs; patient w/ bilateral effusions unclear etiology, pulm consulted for assistance--> unable to perform diagnostic thoracentesis effusions too small. Started po 20mg Lasix daily 7/22.  - repeat echo 7/22. W/ resolution of pericardial effusion. EF wnl.  - appears more tachypneic today. CXR done, will f/u result

## 2021-07-27 NOTE — PROGRESS NOTE ADULT - SUBJECTIVE AND OBJECTIVE BOX
[facility] Division of [facility] Medicine  Avila Whitehead MD  Pager [phone]      Patient is a 89y old  Female who presents with a chief complaint of Frequent Falls, concern for Cellulitis. (26 Jul 2021 12:35)      SUBJECTIVE / OVERNIGHT EVENTS:    no acute event o/n. Pt reports her breathing has improved. No new complaints     ADDITIONAL REVIEW OF SYSTEMS:    RESPIRATORY: No cough, wheezing, chills or hemoptysis; No shortness of breath  CARDIOVASCULAR: No chest pain, palpitations, dizziness, or leg swelling  GASTROINTESTINAL: No abdominal or epigastric pain. No nausea, vomiting, or hematemesis; No diarrhea or constipation. No melena or hematochezia.    MEDICATIONS  (STANDING):  albuterol/ipratropium for Nebulization. 3 milliLiter(s) Nebulizer once  amLODIPine   Tablet 10 milliGRAM(s) Oral daily  aspirin enteric coated 81 milliGRAM(s) Oral daily  furosemide    Tablet 20 milliGRAM(s) Oral daily  lidocaine   Patch 1 Patch Transdermal daily  polyethylene glycol 3350 17 Gram(s) Oral daily  senna 2 Tablet(s) Oral at bedtime    MEDICATIONS  (PRN):  aluminum hydroxide/magnesium hydroxide/simethicone Suspension 30 milliLiter(s) Oral every 4 hours PRN Dyspepsia  melatonin 3 milliGRAM(s) Oral at bedtime PRN Insomnia      CAPILLARY BLOOD GLUCOSE        I&O's Summary    26 Jul 2021 07:01  -  27 Jul 2021 07:00  --------------------------------------------------------  IN: 500 mL / OUT: 910 mL / NET: -410 mL        PHYSICAL EXAM:  Vital Signs Last 24 Hrs  T(C): 36.8 (27 Jul 2021 05:59), Max: 36.8 (26 Jul 2021 19:45)  T(F): 98.2 (27 Jul 2021 05:59), Max: 98.3 (26 Jul 2021 23:00)  HR: 76 (27 Jul 2021 05:59) (75 - 87)  BP: 132/60 (27 Jul 2021 05:59) (124/62 - 135/70)  BP(mean): --  RR: 24 (27 Jul 2021 05:59) (16 - 24)  SpO2: 94% (27 Jul 2021 05:59) (92% - 97%)    CONSTITUTIONAL: NAD,  EYES: PERRLA; conjunctiva and sclera clear  ENMT: Moist oral mucosa, no pharyngeal injection or exudates;   NECK: Supple, no palpable masses;  RESPIRATORY: Normal respiratory effort; b/l crackles bilaterally  CARDIOVASCULAR: Regular rate and rhythm, normal S1 and S2, no murmur/rub/gallop; No lower extremity edema; Peripheral pulses are 2+ bilaterally. pericardial drain in place   ABDOMEN: Nontender to palpation, normoactive bowel sounds, no rebound/guarding;   MUSCLOSKELETAL:   no clubbing or cyanosis of digits; no joint swelling or tenderness to palpation  PSYCH: A+O to person, place, and time; affect appropriate  NEUROLOGY: CN 2-12 are intact and symmetric; no gross sensory deficits;   SKIN: chronic LE venous stasis changes     LABS:                        10.9   6.27  )-----------( 154      ( 27 Jul 2021 06:44 )             34.1     07-27    138  |  97<L>  |  20  ----------------------------<  129<H>  4.6   |  29  |  0.78    Ca    8.9      27 Jul 2021 06:44  Phos  3.5     07-27  Mg     2.10     07-27                  RADIOLOGY & ADDITIONAL TESTS:  Results Reviewed:   Imaging Personally Reviewed:  Electrocardiogram Personally Reviewed:    COORDINATION OF CARE:  Care Discussed with Consultants/Other Providers [Y/N]:  Prior or Outpatient Records Reviewed [Y/N]:

## 2021-07-28 NOTE — PROGRESS NOTE ADULT - SUBJECTIVE AND OBJECTIVE BOX
[facility] Division of [facility] Medicine  Avila Whitehead MD  Pager [phone]      Patient is a 89y old  Female who presents with a chief complaint of Frequent Falls, concern for Cellulitis. (27 Jul 2021 11:11)      SUBJECTIVE / OVERNIGHT EVENTS:    No fever o/n. No new complaints. endorses mild sob, no chest pain     ADDITIONAL REVIEW OF SYSTEMS:    RESPIRATORY: No cough, wheezing, chills or hemoptysis; No shortness of breath  CARDIOVASCULAR: No chest pain, palpitations, dizziness, or leg swelling  GASTROINTESTINAL: No abdominal or epigastric pain. No nausea, vomiting, or hematemesis; No diarrhea or constipation. No melena or hematochezia.      MEDICATIONS  (STANDING):  amLODIPine   Tablet 10 milliGRAM(s) Oral daily  aspirin enteric coated 81 milliGRAM(s) Oral daily  furosemide    Tablet 20 milliGRAM(s) Oral daily  lidocaine   Patch 1 Patch Transdermal daily  polyethylene glycol 3350 17 Gram(s) Oral daily  senna 2 Tablet(s) Oral at bedtime    MEDICATIONS  (PRN):  aluminum hydroxide/magnesium hydroxide/simethicone Suspension 30 milliLiter(s) Oral every 4 hours PRN Dyspepsia  melatonin 3 milliGRAM(s) Oral at bedtime PRN Insomnia      CAPILLARY BLOOD GLUCOSE        I&O's Summary    27 Jul 2021 07:01  -  28 Jul 2021 07:00  --------------------------------------------------------  IN: 500 mL / OUT: 807 mL / NET: -307 mL        PHYSICAL EXAM:  Vital Signs Last 24 Hrs  T(C): 36.3 (28 Jul 2021 11:21), Max: 36.6 (28 Jul 2021 04:15)  T(F): 97.4 (28 Jul 2021 11:21), Max: 97.8 (28 Jul 2021 04:15)  HR: 80 (28 Jul 2021 11:21) (73 - 80)  BP: 124/60 (28 Jul 2021 11:21) (120/60 - 130/80)  BP(mean): --  RR: 16 (28 Jul 2021 11:21) (16 - 20)  SpO2: 97% (28 Jul 2021 11:21) (81% - 98%)    CONSTITUTIONAL: NAD,  EYES: PERRLA; conjunctiva and sclera clear  ENMT: Moist oral mucosa, no pharyngeal injection or exudates;   NECK: Supple, no palpable masses;  RESPIRATORY: Normal respiratory effort; b/l crackles bilaterally  CARDIOVASCULAR: Regular rate and rhythm, normal S1 and S2, no murmur/rub/gallop; No lower extremity edema; Peripheral pulses are 2+ bilaterally. pericardial drain in place   ABDOMEN: Nontender to palpation, normoactive bowel sounds, no rebound/guarding;   MUSCLOSKELETAL:   no clubbing or cyanosis of digits; no joint swelling or tenderness to palpation  PSYCH: A+O to person, place, and time; affect appropriate  NEUROLOGY: CN 2-12 are intact and symmetric; no gross sensory deficits;   SKIN: chronic LE venous stasis changes     LABS:                        11.1   5.17  )-----------( 161      ( 28 Jul 2021 06:36 )             34.5     07-28    136  |  98  |  21  ----------------------------<  109<H>  4.7   |  28  |  0.90    Ca    8.9      28 Jul 2021 06:36  Phos  3.5     07-28  Mg     2.20     07-28                  RADIOLOGY & ADDITIONAL TESTS:  Results Reviewed:   Imaging Personally Reviewed:  Electrocardiogram Personally Reviewed:    COORDINATION OF CARE:  Care Discussed with Consultants/Other Providers [Y/N]:  Prior or Outpatient Records Reviewed [Y/N]:

## 2021-07-29 NOTE — PROGRESS NOTE ADULT - SUBJECTIVE AND OBJECTIVE BOX
[facility] Division of [facility] Medicine  Avila Whitehead MD  Pager [phone]      Patient is a 89y old  Female who presents with a chief complaint of Frequent Falls, concern for Cellulitis. (28 Jul 2021 13:09)      SUBJECTIVE / OVERNIGHT EVENTS:    no acute event o/n. pt offers no new complaint     ADDITIONAL REVIEW OF SYSTEMS:    RESPIRATORY: No cough, wheezing, chills or hemoptysis; No shortness of breath  CARDIOVASCULAR: No chest pain, palpitations, dizziness, or leg swelling  GASTROINTESTINAL: No abdominal or epigastric pain. No nausea, vomiting, or hematemesis; No diarrhea or constipation. No melena or hematochezia.    MEDICATIONS  (STANDING):  amLODIPine   Tablet 10 milliGRAM(s) Oral daily  aspirin enteric coated 81 milliGRAM(s) Oral daily  furosemide    Tablet 20 milliGRAM(s) Oral daily  lidocaine   Patch 1 Patch Transdermal daily  polyethylene glycol 3350 17 Gram(s) Oral daily  senna 2 Tablet(s) Oral at bedtime    MEDICATIONS  (PRN):  aluminum hydroxide/magnesium hydroxide/simethicone Suspension 30 milliLiter(s) Oral every 4 hours PRN Dyspepsia  melatonin 3 milliGRAM(s) Oral at bedtime PRN Insomnia      CAPILLARY BLOOD GLUCOSE        I&O's Summary    28 Jul 2021 07:01  -  29 Jul 2021 07:00  --------------------------------------------------------  IN: 0 mL / OUT: 15 mL / NET: -15 mL        PHYSICAL EXAM:  Vital Signs Last 24 Hrs  T(C): 36.7 (29 Jul 2021 05:30), Max: 36.7 (28 Jul 2021 22:20)  T(F): 98 (29 Jul 2021 05:30), Max: 98 (28 Jul 2021 22:20)  HR: 76 (29 Jul 2021 10:48) (71 - 83)  BP: 124/67 (29 Jul 2021 05:30) (120/60 - 124/67)  BP(mean): --  RR: 17 (29 Jul 2021 05:30) (16 - 17)  SpO2: 94% (29 Jul 2021 10:48) (94% - 99%)    CONSTITUTIONAL: NAD,  EYES: PERRLA; conjunctiva and sclera clear  ENMT: Moist oral mucosa, no pharyngeal injection or exudates;   NECK: Supple, no palpable masses;  RESPIRATORY: Normal respiratory effort; b/l crackles bilaterally  CARDIOVASCULAR: Regular rate and rhythm, normal S1 and S2, no murmur/rub/gallop; No lower extremity edema; Peripheral pulses are 2+ bilaterally. pericardial drain in place   ABDOMEN: Nontender to palpation, normoactive bowel sounds, no rebound/guarding;   MUSCLOSKELETAL:   no clubbing or cyanosis of digits; no joint swelling or tenderness to palpation  PSYCH: A+O to person, place, and time; affect appropriate  NEUROLOGY: CN 2-12 are intact and symmetric; no gross sensory deficits;   SKIN: chronic LE venous stasis changes       LABS:                        11.2   4.47  )-----------( 191      ( 29 Jul 2021 06:33 )             36.1     07-29    135  |  97<L>  |  21  ----------------------------<  108<H>  5.2   |  30  |  0.83    Ca    8.8      29 Jul 2021 06:33  Phos  3.5     07-29  Mg     2.40     07-29                  RADIOLOGY & ADDITIONAL TESTS:  Results Reviewed:   Imaging Personally Reviewed:  Electrocardiogram Personally Reviewed:    COORDINATION OF CARE:  Care Discussed with Consultants/Other Providers [Y/N]:  Prior or Outpatient Records Reviewed [Y/N]:

## 2021-07-30 NOTE — PROGRESS NOTE ADULT - SUBJECTIVE AND OBJECTIVE BOX
[facility] Division of [facility] Medicine  Avila Whitehead MD  Pager [phone]      Patient is a 89y old  Female who presents with a chief complaint of Frequent Falls, concern for Cellulitis. (29 Jul 2021 11:15)      SUBJECTIVE / OVERNIGHT EVENTS:    No acute event. Pt reports mild SOB, unchanged from prior. No new complaint     ADDITIONAL REVIEW OF SYSTEMS:    RESPIRATORY: No cough, wheezing, chills or hemoptysis; No shortness of breath  CARDIOVASCULAR: No chest pain, palpitations, dizziness, or leg swelling  GASTROINTESTINAL: No abdominal or epigastric pain. No nausea, vomiting, or hematemesis; No diarrhea or constipation. No melena or hematochezia.      MEDICATIONS  (STANDING):  amLODIPine   Tablet 10 milliGRAM(s) Oral daily  aspirin enteric coated 81 milliGRAM(s) Oral daily  furosemide    Tablet 20 milliGRAM(s) Oral daily  lidocaine   Patch 1 Patch Transdermal daily  polyethylene glycol 3350 17 Gram(s) Oral daily  senna 2 Tablet(s) Oral at bedtime    MEDICATIONS  (PRN):  aluminum hydroxide/magnesium hydroxide/simethicone Suspension 30 milliLiter(s) Oral every 4 hours PRN Dyspepsia  melatonin 3 milliGRAM(s) Oral at bedtime PRN Insomnia      CAPILLARY BLOOD GLUCOSE        I&O's Summary    29 Jul 2021 07:01  -  30 Jul 2021 07:00  --------------------------------------------------------  IN: 0 mL / OUT: 10 mL / NET: -10 mL        PHYSICAL EXAM:  Vital Signs Last 24 Hrs  T(C): 36.7 (30 Jul 2021 06:00), Max: 36.7 (30 Jul 2021 06:00)  T(F): 98 (30 Jul 2021 06:00), Max: 98 (30 Jul 2021 06:00)  HR: 73 (30 Jul 2021 11:01) (71 - 85)  BP: 109/60 (30 Jul 2021 06:00) (101/63 - 133/60)  BP(mean): --  RR: 17 (30 Jul 2021 06:00) (17 - 18)  SpO2: 97% (30 Jul 2021 11:01) (95% - 100%)    CONSTITUTIONAL: NAD,  EYES: PERRLA; conjunctiva and sclera clear  ENMT: Moist oral mucosa, no pharyngeal injection or exudates;   NECK: Supple, no palpable masses;  RESPIRATORY: Normal respiratory effort; b/l crackles bilaterally  CARDIOVASCULAR: Regular rate and rhythm, normal S1 and S2, no murmur/rub/gallop; No lower extremity edema; Peripheral pulses are 2+ bilaterally. pericardial drain in place   ABDOMEN: Nontender to palpation, normoactive bowel sounds, no rebound/guarding;   MUSCLOSKELETAL:   no clubbing or cyanosis of digits; no joint swelling or tenderness to palpation  PSYCH: A+O to person, place, and time; affect appropriate  NEUROLOGY: CN 2-12 are intact and symmetric; no gross sensory deficits;   SKIN: chronic LE venous stasis changes     LABS:                        10.7   4.88  )-----------( 198      ( 30 Jul 2021 08:30 )             34.0     07-30    133<L>  |  96<L>  |  26<H>  ----------------------------<  103<H>  5.2   |  27  |  0.89    Ca    8.5      30 Jul 2021 06:58  Phos  3.6     07-30  Mg     2.40     07-30                  RADIOLOGY & ADDITIONAL TESTS:  Results Reviewed:   Imaging Personally Reviewed:  Electrocardiogram Personally Reviewed:    COORDINATION OF CARE:  Care Discussed with Consultants/Other Providers [Y/N]:  Prior or Outpatient Records Reviewed [Y/N]:

## 2021-07-31 NOTE — PROGRESS NOTE ADULT - PROBLEM SELECTOR PLAN 2
In the setting of bilateral LE swelling, chronic venous stasis dermatitis.   R >L, increased redness and warmth of right LE. Redness improving, now more dusky. Also now not warm to the touch.  s/p IV Unasyn for 7 days. monitor off abx  BCx NGTD   Doppler negative for DVT

## 2021-07-31 NOTE — PROGRESS NOTE ADULT - PROBLEM SELECTOR PLAN 1
Acute respiratory failure 2/2 to pericardial tamponade.  - patient w/ increasing dyspnea 7/21, placed on BIPAP. CXR w/ pleural effusions L>R. Patient s/p 20mg IV push of lasix 7/21. Patient now able to be placed NC however w/ some mild tachypnea.   - D-dimer elevated. CTA performed -> w/o evidence of PE  - patient last echo-> normal left ventricular systolic function, large pericardial effusion, right ventricular diastolic collapse is present , consistent w/ tamponade.   - patient s/p pericardiocentesis w/ 700 cc drained now w/ drain to water seal   - f/u IR recs, f/u cards recs; patient w/ bilateral effusions unclear etiology, pulm consulted for assistance--> unable to perform diagnostic thoracentesis as effusions too small. Started Lasix 20mg PO daily 7/22.  - repeat echo 7/22 w/ resolution of pericardial effusion. EF wnl.

## 2021-07-31 NOTE — PROGRESS NOTE ADULT - PROBLEM SELECTOR PLAN 3
CT Chest with large pericardial effusion.   Admission ECHO w/ evidence of tamponade   S/p pericardiocentesis 700cc drained, drain to water seal. Repeat echo w/ resolution of pericardial effusion.   Telemetry monitoring for now.   f/u further cardiology recs  monitor output from pericardial drain. f/u IR when to dc

## 2021-07-31 NOTE — PROGRESS NOTE ADULT - SUBJECTIVE AND OBJECTIVE BOX
PROGRESS NOTE:     Patient is a 89y old  Female who presents with a chief complaint of Frequent Falls, concern for Cellulitis. (30 Jul 2021 11:09)      SUBJECTIVE / OVERNIGHT EVENTS: Breathing status stable.     ADDITIONAL REVIEW OF SYSTEMS:    MEDICATIONS  (STANDING):  amLODIPine   Tablet 10 milliGRAM(s) Oral daily  aspirin enteric coated 81 milliGRAM(s) Oral daily  furosemide    Tablet 20 milliGRAM(s) Oral daily  lidocaine   Patch 1 Patch Transdermal daily  polyethylene glycol 3350 17 Gram(s) Oral daily  senna 2 Tablet(s) Oral at bedtime    MEDICATIONS  (PRN):  aluminum hydroxide/magnesium hydroxide/simethicone Suspension 30 milliLiter(s) Oral every 4 hours PRN Dyspepsia  melatonin 3 milliGRAM(s) Oral at bedtime PRN Insomnia      CAPILLARY BLOOD GLUCOSE        I&O's Summary    30 Jul 2021 07:01  -  31 Jul 2021 07:00  --------------------------------------------------------  IN: 0 mL / OUT: 0 mL / NET: 0 mL        PHYSICAL EXAM:  Vital Signs Last 24 Hrs  T(C): 36.8 (31 Jul 2021 05:00), Max: 36.8 (31 Jul 2021 05:00)  T(F): 98.3 (31 Jul 2021 05:00), Max: 98.3 (31 Jul 2021 05:00)  HR: 77 (31 Jul 2021 11:00) (70 - 78)  BP: 126/62 (31 Jul 2021 05:00) (112/48 - 126/62)  BP(mean): 83 (31 Jul 2021 05:00) (67 - 83)  RR: 18 (31 Jul 2021 05:00) (18 - 18)  SpO2: 97% (31 Jul 2021 11:00) (95% - 99%)    CONSTITUTIONAL: NAD,  EYES: PERRLA; conjunctiva and sclera clear  ENMT: Moist oral mucosa, no pharyngeal injection or exudates;   NECK: Supple, no palpable masses;  RESPIRATORY: Normal respiratory effort; b/l crackles bilaterally  CARDIOVASCULAR: Regular rate and rhythm, normal S1 and S2, no murmur/rub/gallop; No lower extremity edema; Peripheral pulses are 2+ bilaterally. pericardial drain in place   ABDOMEN: Nontender to palpation, normoactive bowel sounds, no rebound/guarding;   MUSCLOSKELETAL:   no clubbing or cyanosis of digits; no joint swelling or tenderness to palpation  PSYCH: A+O to person, place, and time; affect appropriate  NEUROLOGY: CN 2-12 are intact and symmetric; no gross sensory deficits;   SKIN: chronic LE venous stasis changes     LABS:                        10.3   4.66  )-----------( 198      ( 31 Jul 2021 08:09 )             32.7     07-31    134<L>  |  96<L>  |  24<H>  ----------------------------<  92  4.8   |  29  |  0.90    Ca    8.8      31 Jul 2021 08:09  Phos  3.6     07-31  Mg     2.30     07-31                  RADIOLOGY & ADDITIONAL TESTS:  Results Reviewed:   Imaging Personally Reviewed:  Electrocardiogram Personally Reviewed:    COORDINATION OF CARE:  Care Discussed with Consultants/Other Providers [Y/N]:  Prior or Outpatient Records Reviewed [Y/N]:

## 2021-08-01 NOTE — PROGRESS NOTE ADULT - PROBLEM SELECTOR PLAN 1
Acute respiratory failure 2/2 to pericardial tamponade.  - patient w/ increasing dyspnea 7/21, placed on BIPAP. CXR w/ pleural effusions L>R. Patient s/p 20mg IV push of lasix 7/21. Patient now able to be placed NC however w/ some mild tachypnea.   - D-dimer elevated. CTA performed -> w/o evidence of PE  - patient last echo-> normal left ventricular systolic function, large pericardial effusion, right ventricular diastolic collapse is present , consistent w/ tamponade.   - patient s/p pericardiocentesis w/ 700 cc drained now w/ pericardial drain to water seal   - f/u IR recs, f/u cards recs; patient w/ bilateral effusions unclear etiology, pulm consulted for assistance--> unable to perform diagnostic thoracentesis as effusions too small. Started Lasix 20mg PO daily 7/22.  - repeat echo 7/22 w/ resolution of pericardial effusion. EF wnl.

## 2021-08-01 NOTE — PROGRESS NOTE ADULT - SUBJECTIVE AND OBJECTIVE BOX
PROGRESS NOTE:     Patient is a 89y old  Female who presents with a chief complaint of Frequent Falls, concern for Cellulitis. (31 Jul 2021 12:19)      SUBJECTIVE / OVERNIGHT EVENTS: No acute events. Patient feels tired, no pain.     ADDITIONAL REVIEW OF SYSTEMS:    MEDICATIONS  (STANDING):  amLODIPine   Tablet 10 milliGRAM(s) Oral daily  aspirin enteric coated 81 milliGRAM(s) Oral daily  furosemide    Tablet 20 milliGRAM(s) Oral daily  lidocaine   Patch 1 Patch Transdermal daily  polyethylene glycol 3350 17 Gram(s) Oral daily  senna 2 Tablet(s) Oral at bedtime    MEDICATIONS  (PRN):  aluminum hydroxide/magnesium hydroxide/simethicone Suspension 30 milliLiter(s) Oral every 4 hours PRN Dyspepsia  melatonin 3 milliGRAM(s) Oral at bedtime PRN Insomnia      CAPILLARY BLOOD GLUCOSE        I&O's Summary    31 Jul 2021 07:01  -  01 Aug 2021 07:00  --------------------------------------------------------  IN: 0 mL / OUT: 410 mL / NET: -410 mL        PHYSICAL EXAM:  Vital Signs Last 24 Hrs  T(C): 36.6 (01 Aug 2021 05:00), Max: 36.6 (01 Aug 2021 05:00)  T(F): 97.9 (01 Aug 2021 05:00), Max: 97.9 (01 Aug 2021 05:00)  HR: 75 (01 Aug 2021 07:48) (70 - 80)  BP: 133/65 (01 Aug 2021 05:00) (110/54 - 133/65)  BP(mean): --  RR: 19 (31 Jul 2021 21:00) (19 - 19)  SpO2: 94% (01 Aug 2021 07:48) (93% - 98%)    CONSTITUTIONAL: NAD,  EYES: PERRLA; conjunctiva and sclera clear  ENMT: Moist oral mucosa, no pharyngeal injection or exudates;   NECK: Supple, no palpable masses;  RESPIRATORY: Normal respiratory effort; b/l crackles bilaterally  CARDIOVASCULAR: Regular rate and rhythm, normal S1 and S2, no murmur/rub/gallop; No lower extremity edema; Peripheral pulses are 2+ bilaterally. pericardial drain in place   ABDOMEN: Nontender to palpation, normoactive bowel sounds, no rebound/guarding;   MUSCLOSKELETAL:   no clubbing or cyanosis of digits; no joint swelling or tenderness to palpation  PSYCH: A+O to person, place, and time; affect appropriate  NEUROLOGY: CN 2-12 are intact and symmetric; no gross sensory deficits;   SKIN: chronic LE venous stasis changes     LABS:                        10.9   5.45  )-----------( 198      ( 01 Aug 2021 06:21 )             33.8     08-01    135  |  97<L>  |  22  ----------------------------<  104<H>  4.8   |  31  |  0.83    Ca    8.8      01 Aug 2021 06:21  Phos  3.1     08-01  Mg     2.30     08-01                  RADIOLOGY & ADDITIONAL TESTS:  Results Reviewed:   Imaging Personally Reviewed:  Electrocardiogram Personally Reviewed:    COORDINATION OF CARE:  Care Discussed with Consultants/Other Providers [Y/N]:  Prior or Outpatient Records Reviewed [Y/N]:

## 2021-08-02 NOTE — CHART NOTE - NSCHARTNOTEFT_GEN_A_CORE
RD follow-up for length of stay.      Patient a 89F PMH of HTN, who presents to ED for repeated falls over the past week found to have 5th/6th rib fractures, incidental pericardial effusion, and cellulitis- admitted for pain control and IV Antibiotics.     Patient endorses fair to good appetite, drinking water at time of visit. Patient noted to consume breakfast with good intake (75%) this AM.  No reported GI distress; no reported chewing/swallowing difficulties.    Diet, Dysphagia 2 Mechanical Soft-Thin Liquids (07-20-21 @ 00:10)    Current Weight: No new weights to assess; 7/16 - 75.9kg      Pertinent Medications:   amLODIPine   Tablet  furosemide    Tablet  polyethylene glycol 3350  senna    Pertinent Labs:  08-02 Na133 mmol/L<L> Glu 104 mg/dL<H> K+ 4.4 mmol/L Cr  0.81 mg/dL BUN 17 mg/dL 08-02 Phos 3.4 mg/dL      Skin: Lt heel Stage II pressure injury noted per nursing flowsheets (7/31/21)    Estimated Needs:   [ ] no change since previous assessment  [X] recalculated: due to pressure injury, 54.4kg IBW  30-35kcal/rx=[id number]  1.4-1.6gm/kg/protein=76-87gm      Nutrition Diagnosis:  Increased nutritional needs, related to increased physiological demands as evidenced by stage II pressure injury to lt heel, increased protein and calorie needs to support wound healing.      Additional Recommendations:  1) Suggest Ensure Compact 2x daily (440, 18gm protein);   2) May consider multivitamin tablet daily;  3) Assist with meals as needed;

## 2021-08-02 NOTE — PROGRESS NOTE ADULT - PROBLEM SELECTOR PLAN 5
CT Chest with large pericardial effusion.   Admission ECHO w/ evidence of tamponade   S/p pericardiocentesis 700cc drained, chest tube to water seal. Repeat echo w/ resolution of pericardial effusion.   Telemetry monitoring for now.   monitor output from pericardial drain. plan to d/c drain tomorrow

## 2021-08-02 NOTE — PROGRESS NOTE ADULT - PROBLEM SELECTOR PLAN 2
Acute respiratory failure 2/2 to pericardial tamponade.  - patient w/ increasing dyspnea 7/21, placed on BIPAP. CXR w/ pleural effusions L>R. Patient s/p 20mg IV push of lasix 7/21. Patient now able to be placed NC however w/ some mild tachypnea.   - D-dimer elevated. CTA performed -> w/o evidence of PE  - patient last echo-> normal left ventricular systolic function, large pericardial effusion, right ventricular diastolic collapse is present , consistent w/ tamponade.   - patient s/p pericardiocentesis w/ 700 cc drained now w/ chest tube to water seal   - f/u IR recs, f/u cards recs; patient w/ bilateral effusions unclear etiology, pulm consulted for assistance--> unable to perform diagnostic thoracentesis effusions too small. Started po 20mg Lasix daily 7/22.  - repeat echo 7/22, 8/1 W/ resolution of pericardial effusion. EF wnl.

## 2021-08-02 NOTE — PROGRESS NOTE ADULT - ASSESSMENT
89y Female s/p pericardial drain on 7/19/21 in Interventional Radiology.     Plan:  - echo (7/30/21) with small pericardial effusion  - flushed drain with 5cc NS   - monitor output; if minimal can likely pull tomorrow     [id number]

## 2021-08-02 NOTE — PROGRESS NOTE ADULT - SUBJECTIVE AND OBJECTIVE BOX
[facility] Division of [facility] Medicine  Avila Whitehead MD  Pager [phone]      Patient is a 89y old  Female who presents with a chief complaint of Frequent Falls, concern for Cellulitis. (02 Aug 2021 10:50)      SUBJECTIVE / OVERNIGHT EVENTS:    o/n nursing reports purulent discharge from chest tube insertion site. This am tube evaluated and dressing changed by IR. Pt reports feeling "so so". no new complaint     ADDITIONAL REVIEW OF SYSTEMS:    RESPIRATORY: No cough, wheezing, chills or hemoptysis; No shortness of breath  CARDIOVASCULAR: No chest pain, palpitations, dizziness, or leg swelling  GASTROINTESTINAL: No abdominal or epigastric pain. No nausea, vomiting, or hematemesis; No diarrhea or constipation. No melena or hematochezia.      MEDICATIONS  (STANDING):  amLODIPine   Tablet 10 milliGRAM(s) Oral daily  aspirin enteric coated 81 milliGRAM(s) Oral daily  furosemide    Tablet 20 milliGRAM(s) Oral daily  lidocaine   Patch 1 Patch Transdermal daily  polyethylene glycol 3350 17 Gram(s) Oral daily  senna 2 Tablet(s) Oral at bedtime    MEDICATIONS  (PRN):  aluminum hydroxide/magnesium hydroxide/simethicone Suspension 30 milliLiter(s) Oral every 4 hours PRN Dyspepsia  melatonin 3 milliGRAM(s) Oral at bedtime PRN Insomnia      CAPILLARY BLOOD GLUCOSE        I&O's Summary    01 Aug 2021 07:01  -  02 Aug 2021 07:00  --------------------------------------------------------  IN: 600 mL / OUT: 665 mL / NET: -65 mL        PHYSICAL EXAM:  Vital Signs Last 24 Hrs  T(C): 36.4 (02 Aug 2021 05:16), Max: 36.4 (01 Aug 2021 20:00)  T(F): 97.6 (02 Aug 2021 05:16), Max: 97.6 (01 Aug 2021 20:00)  HR: 71 (02 Aug 2021 10:53) (56 - 77)  BP: 113/60 (02 Aug 2021 05:16) (112/50 - 117/54)  BP(mean): --  RR: 16 (02 Aug 2021 05:16) (16 - 20)  SpO2: 97% (02 Aug 2021 10:53) (95% - 98%)    CONSTITUTIONAL: NAD,  EYES: PERRLA; conjunctiva and sclera clear  ENMT: Moist oral mucosa, no pharyngeal injection or exudates;   NECK: Supple, no palpable masses;  RESPIRATORY: Normal respiratory effort; b/l crackles bilaterally  CARDIOVASCULAR: Regular rate and rhythm, normal S1 and S2, no murmur/rub/gallop; No lower extremity edema; Peripheral pulses are 2+ bilaterally. pericardial drain in place   ABDOMEN: Nontender to palpation, normoactive bowel sounds, no rebound/guarding;   MUSCLOSKELETAL:   no clubbing or cyanosis of digits; no joint swelling or tenderness to palpation  PSYCH: A+O to person, place, and time; affect appropriate  NEUROLOGY: CN 2-12 are intact and symmetric; no gross sensory deficits;   SKIN: chronic LE venous stasis changes       LABS:                        10.4   4.92  )-----------( 191      ( 02 Aug 2021 06:24 )             32.5     08-02    133<L>  |  94<L>  |  17  ----------------------------<  104<H>  4.4   |  31  |  0.81    Ca    8.9      02 Aug 2021 06:24  Phos  3.4     08-02  Mg     2.10     08-02                  RADIOLOGY & ADDITIONAL TESTS:  Results Reviewed:   Imaging Personally Reviewed:  Electrocardiogram Personally Reviewed:    COORDINATION OF CARE:  Care Discussed with Consultants/Other Providers [Y/N]:  Prior or Outpatient Records Reviewed [Y/N]:

## 2021-08-02 NOTE — PROGRESS NOTE ADULT - SUBJECTIVE AND OBJECTIVE BOX
89y Female s/p pericardial drain on 7/19/21 in Interventional Radiology. Patient seen and examined bedside resting comfortably. No complaints offered. Minimal output noted.     T(F): 97.6 (08-02-21 @ 05:16), Max: 98 (08-01-21 @ 12:00)  HR: 56 (08-02-21 @ 07:34) (56 - 77)  BP: 113/60 (08-02-21 @ 05:16) (112/50 - 131/66)  RR: 16 (08-02-21 @ 05:16) (16 - 20)  SpO2: 98% (08-02-21 @ 07:34) (95% - 98%)  Wt(kg): --    LABS:                        10.4   4.92  )-----------( 191      ( 02 Aug 2021 06:24 )             32.5     08-02    133<L>  |  94<L>  |  17  ----------------------------<  104<H>  4.4   |  31  |  0.81    Ca    8.9      02 Aug 2021 06:24  Phos  3.4     08-02  Mg     2.10     08-02        I&O's Detail    01 Aug 2021 07:01  -  02 Aug 2021 07:00  --------------------------------------------------------  IN:    Oral Fluid: 600 mL  Total IN: 600 mL    OUT:    Chest Tube (mL): 15 mL    Voided (mL): 650 mL  Total OUT: 665 mL    Total NET: -65 mL      PHYSICAL EXAM:  General: Nontoxic, in NAD  Pericardial drain c/d/i, flushed drain with 5cc NS     < from: Limited Transthoracic Echo (07.30.21 @ 13:14) >  CONCLUSIONS:  Limited repeat study to re-evaluate pericardial effuson.  Small pericardial effusion adjacent to the RV free wall.  *** Compared with echocardiogram of 7/22/2021, a small  pericardial effusion is now seen.    < end of copied text >

## 2021-08-03 NOTE — PROGRESS NOTE ADULT - SUBJECTIVE AND OBJECTIVE BOX
[facility] Division of [facility] Medicine  Avila Whitehead MD  Pager [phone]      Patient is a 89y old  Female who presents with a chief complaint of Frequent Falls, concern for Cellulitis. (02 Aug 2021 13:23)      SUBJECTIVE / OVERNIGHT EVENTS:    No new event. Pt denies chest pain, sob. No new complaint    ADDITIONAL REVIEW OF SYSTEMS:    RESPIRATORY: No cough, wheezing, chills or hemoptysis; No shortness of breath  CARDIOVASCULAR: No chest pain, palpitations, dizziness, or leg swelling  GASTROINTESTINAL: No abdominal or epigastric pain. No nausea, vomiting, or hematemesis; No diarrhea or constipation. No melena or hematochezia.    MEDICATIONS  (STANDING):  amLODIPine   Tablet 10 milliGRAM(s) Oral daily  aspirin enteric coated 81 milliGRAM(s) Oral daily  furosemide    Tablet 20 milliGRAM(s) Oral daily  lidocaine   Patch 1 Patch Transdermal daily  multivitamin 1 Tablet(s) Oral daily  polyethylene glycol 3350 17 Gram(s) Oral daily  senna 2 Tablet(s) Oral at bedtime    MEDICATIONS  (PRN):  aluminum hydroxide/magnesium hydroxide/simethicone Suspension 30 milliLiter(s) Oral every 4 hours PRN Dyspepsia  melatonin 3 milliGRAM(s) Oral at bedtime PRN Insomnia      CAPILLARY BLOOD GLUCOSE        I&O's Summary    02 Aug 2021 07:01  -  03 Aug 2021 07:00  --------------------------------------------------------  IN: 1500 mL / OUT: 1370 mL / NET: 130 mL        PHYSICAL EXAM:  Vital Signs Last 24 Hrs  T(C): 36.6 (03 Aug 2021 06:01), Max: 36.6 (02 Aug 2021 13:24)  T(F): 97.9 (03 Aug 2021 06:01), Max: 97.9 (02 Aug 2021 17:40)  HR: 72 (03 Aug 2021 07:38) (69 - 81)  BP: 108/58 (03 Aug 2021 06:01) (108/58 - 119/60)  BP(mean): --  RR: 15 (03 Aug 2021 06:01) (15 - 16)  SpO2: 100% (03 Aug 2021 07:38) (94% - 100%)    CONSTITUTIONAL: NAD,  EYES: PERRLA; conjunctiva and sclera clear  ENMT: Moist oral mucosa, no pharyngeal injection or exudates;   NECK: Supple, no palpable masses;  RESPIRATORY: Normal respiratory effort; b/l crackles bilaterally  CARDIOVASCULAR: Regular rate and rhythm, normal S1 and S2, no murmur/rub/gallop; No lower extremity edema; Peripheral pulses are 2+ bilaterally. pericardial drain in place   ABDOMEN: Nontender to palpation, normoactive bowel sounds, no rebound/guarding;   MUSCLOSKELETAL:   no clubbing or cyanosis of digits; no joint swelling or tenderness to palpation  PSYCH: A+O to person, place, and time; affect appropriate  NEUROLOGY: CN 2-12 are intact and symmetric; no gross sensory deficits;   SKIN: chronic LE venous stasis changes     LABS:                        10.3   5.98  )-----------( 190      ( 03 Aug 2021 08:52 )             32.4     08-03    134<L>  |  95<L>  |  19  ----------------------------<  94  4.3   |  28  |  0.80    Ca    8.9      03 Aug 2021 08:52  Phos  3.5     08-03  Mg     2.20     08-03                  RADIOLOGY & ADDITIONAL TESTS:  Results Reviewed:   Imaging Personally Reviewed:  Electrocardiogram Personally Reviewed:    COORDINATION OF CARE:  Care Discussed with Consultants/Other Providers [Y/N]:  Prior or Outpatient Records Reviewed [Y/N]:

## 2021-08-04 NOTE — PROGRESS NOTE ADULT - PROBLEM SELECTOR PLAN 1
In the setting of bilateral LE swelling, chronic venous stasis dermatitis. resolved  s/p Ceftriaxone, s/p IV Unasyn for 7 days. monitor off abx  BCx NGTD   Doppler negative for DVT

## 2021-08-04 NOTE — PROGRESS NOTE ADULT - PROBLEM SELECTOR PLAN 5
CT Chest with large pericardial effusion.   Admission ECHO w/ evidence of tamponade   S/p pericardiocentesis 700cc drained, chest tube to water seal. Repeat echo w/ resolution of pericardial effusion.   Telemetry monitoring for now.   monitor output from pericardial drain. f/u IR

## 2021-08-04 NOTE — DISCHARGE NOTE PROVIDER - NSFOLLOWUPCLINICS_GEN_ALL_ED_FT
[facility] Pulmonolgy and Sleep Medicine  Pulmonology  [address] [place] [address], [place] [address]  Phone: [phone]  Fax:

## 2021-08-04 NOTE — DISCHARGE NOTE PROVIDER - NSDCMRMEDTOKEN_GEN_ALL_CORE_FT
amLODIPine 5 mg oral tablet: 1 tab(s) orally once a day  Aspir 81 oral delayed release tablet: 1 tab(s) orally once a day  DULoxetine: 1 tab(s) orally once a day    daughter doesn&#x27;t know dose   losartan 25 mg oral tablet: 0.5 tab(s) orally once a day   Aspir 81 oral delayed release tablet: 1 tab(s) orally once a day  DULoxetine: 1 tab(s) orally once a day    daughter doesn&#x27;t know dose   losartan 25 mg oral tablet: 0.5 tab(s) orally once a day   amLODIPine 5 mg oral tablet: 1 tab(s) orally once a day  Aspir 81 oral delayed release tablet: 1 tab(s) orally once a day  furosemide 20 mg oral tablet: 1 tab(s) orally once a day  lidocaine 5% topical film: Apply topically to affected area once a day

## 2021-08-04 NOTE — CHART NOTE - NSCHARTNOTEFT_GEN_A_CORE
Patient has a large pericardial effusion, cardiac tamponade, s/p pericardiocentesis and has acute right sided rib fractures (5th and 6th ribs) as a result of recent fall. Patient demonstrates decreased mobility, endurance and sitting balance. Patient would medically benefit from a semi-electrical hospital bed to allow for head of bed elevation to 30 degrees or higher to prevent aspiration, allow for maximum chest expansion to assist with breathing, allow for frequent changes in positioning and out of bed activities which is not otherwise feasible in an ordinary bed.

## 2021-08-04 NOTE — DISCHARGE NOTE PROVIDER - CARE PROVIDER_API CALL
Gabriel Ren)  Internal Medicine; Medical Oncology  [address] [place] [address]  Phone: [phone]  Fax: [phone]  Follow Up Time:     Dax Miner)  Surgery; Thoracic Surgery  [address], [place] [address]  Phone: [phone]  Fax: [phone]  Follow Up Time:

## 2021-08-04 NOTE — PROGRESS NOTE ADULT - SUBJECTIVE AND OBJECTIVE BOX
[facility] Division of [facility] Medicine  Avila Whitehead MD  Pager [phone]      Patient is a 89y old  Female who presents with a chief complaint of Frequent Falls, concern for Cellulitis. (04 Aug 2021 13:08)      SUBJECTIVE / OVERNIGHT EVENTS:    No acute event o/n. No new complaint     ADDITIONAL REVIEW OF SYSTEMS:    RESPIRATORY: No cough, wheezing, chills or hemoptysis; No shortness of breath  CARDIOVASCULAR: No chest pain, palpitations, dizziness, or leg swelling  GASTROINTESTINAL: No abdominal or epigastric pain. No nausea, vomiting, or hematemesis; No diarrhea or constipation. No melena or hematochezia.      MEDICATIONS  (STANDING):  amLODIPine   Tablet 10 milliGRAM(s) Oral daily  aspirin enteric coated 81 milliGRAM(s) Oral daily  furosemide    Tablet 20 milliGRAM(s) Oral daily  lidocaine   Patch 1 Patch Transdermal daily  multivitamin 1 Tablet(s) Oral daily  polyethylene glycol 3350 17 Gram(s) Oral daily  senna 2 Tablet(s) Oral at bedtime    MEDICATIONS  (PRN):  aluminum hydroxide/magnesium hydroxide/simethicone Suspension 30 milliLiter(s) Oral every 4 hours PRN Dyspepsia  melatonin 3 milliGRAM(s) Oral at bedtime PRN Insomnia      CAPILLARY BLOOD GLUCOSE        I&O's Summary    03 Aug 2021 07:01  -  04 Aug 2021 07:00  --------------------------------------------------------  IN: 850 mL / OUT: 1906 mL / NET: -1056 mL        PHYSICAL EXAM:  Vital Signs Last 24 Hrs  T(C): 36.8 (04 Aug 2021 12:00), Max: 36.9 (04 Aug 2021 05:40)  T(F): 98.2 (04 Aug 2021 12:00), Max: 98.5 (04 Aug 2021 05:40)  HR: 60 (04 Aug 2021 12:00) (60 - 75)  BP: 119/62 (04 Aug 2021 12:00) (117/56 - 119/62)  BP(mean): --  RR: 16 (04 Aug 2021 12:00) (16 - 16)  SpO2: 98% (04 Aug 2021 12:00) (96% - 98%)    CONSTITUTIONAL: NAD,  EYES: PERRLA; conjunctiva and sclera clear  ENMT: Moist oral mucosa, no pharyngeal injection or exudates;   NECK: Supple, no palpable masses;  RESPIRATORY: Normal respiratory effort; b/l crackles bilaterally  CARDIOVASCULAR: Regular rate and rhythm, normal S1 and S2, no murmur/rub/gallop; No lower extremity edema; Peripheral pulses are 2+ bilaterally. pericardial drain in place   ABDOMEN: Nontender to palpation, normoactive bowel sounds, no rebound/guarding;   MUSCLOSKELETAL:   no clubbing or cyanosis of digits; no joint swelling or tenderness to palpation  PSYCH: A+O to person, place, and time; affect appropriate  NEUROLOGY: CN 2-12 are intact and symmetric; no gross sensory deficits;   SKIN: chronic LE venous stasis changes     LABS:                        10.6   5.96  )-----------( 177      ( 04 Aug 2021 07:05 )             33.2     08-04    133<L>  |  93<L>  |  20  ----------------------------<  87  4.6   |  25  |  0.89    Ca    8.5      04 Aug 2021 07:05  Phos  3.7     08-04  Mg     2.30     08-04                  RADIOLOGY & ADDITIONAL TESTS:  Results Reviewed:   Imaging Personally Reviewed:  Electrocardiogram Personally Reviewed:    COORDINATION OF CARE:  Care Discussed with Consultants/Other Providers [Y/N]:  Prior or Outpatient Records Reviewed [Y/N]:

## 2021-08-04 NOTE — DISCHARGE NOTE PROVIDER - HOSPITAL COURSE
89 y/o F w/ HTN, who presented to ED for repeated falls, found to have 5th/6th rib fractures, incidental pericardial effusion, and cellulitis of the lower extremities. CT chest with large pericardial effusion and TTE with evidence of tamponade. S/p pericardiocentesis 700cc drained and chest tube remained in placed until drainage was less than 10cc/hour. Repeat TTE with resolution of pericardial effusion. Patient developed acute hypoxic/ hypercarbic respiratory failure secondary to pericardial tamponade. Initially placed on BIPAP, then weaned to nasal cannula and now stable on room air. CTA chest without evidence of PE. Noted to have rib fractures (5th and 6th right ribs) for which patient was managed with incentive spirometry and pain control. Treated for cellulitis of the bilateral LE with IV Unasyn; LE dopplers negative for DVT. Patient with frequent falls- CT Head/ c-spine without acute pathology and no fractures noted. Physical therapy recommended rehab however family choosing to take patient home with home services. CT imaging showed multiple enlarged R axillary lymph nodes. Per GOC discussion, patient states she would not want this worked up at her age. That even if we found cancer she would not want it treated. Mentioned she had a nodule in her breast prior that she opted not for workup and only surveillance (believes it was right breast). Patient does not want aggressive measures. Will not pursue workup further of lymph nodes given not within GOC.           89 y/o F w/ HTN, who presented to ED for repeated falls, found to have 5th/6th rib fractures, incidental pericardial effusion, and cellulitis of the lower extremities. CT chest with large pericardial effusion and TTE with evidence of tamponade. S/p pericardiocentesis 700cc drained and chest tube remained in placed until drainage was less than 10cc/hour. Repeat TTE with resolution of pericardial effusion. Patient developed acute hypoxic/ hypercarbic respiratory failure secondary to pericardial tamponade. Initially placed on BIPAP, then weaned to nasal cannula. Qualifies for home O2 and will be sent home with O2. CTA chest without evidence of PE. Noted to have rib fractures (5th and 6th right ribs) for which patient was managed with incentive spirometry and pain control. Treated for cellulitis of the bilateral LE with IV Unasyn; LE dopplers negative for DVT. Patient with frequent falls- CT Head/ c-spine without acute pathology and no fractures noted. Physical therapy recommended rehab however family choosing to take patient home with home services. CT imaging showed multiple enlarged R axillary lymph nodes. Per GOC discussion, patient states she would not want this worked up at her age. That even if we found cancer she would not want it treated. Mentioned she had a nodule in her breast prior that she opted not for workup and only surveillance (believes it was right breast). Patient does not want aggressive measures. Will not pursue workup further of lymph nodes given not within GOC.           A 89F PMH of HTN, HLD, Chronic Venous Stasis Dermatitis, Pericardial Effusion who presents to ED for repeated falls over the past week, last fall last night, pt states she lost her footing and landed on the couch, was able to right herself up but was unable to get out of bed, pt has been feeling weak and having leg weakness x weeks. Pt also endorses L ankle pain since the fall last night, as per daughter in room pt had wound which was being treated on R leg but unsure of recent swelling. Pt denies any recent/current symptoms of shortness of breath, chest pain, abdominal pain, nausea, vomiting, dizziness, paplitations, urinary frequency, denies fevers/chills, nausea/vomiting. Patient states that she is independent in most activities of daily living and ambulates with a cane and at times "crawls" up the stairs to get to the 2nd level. Receives [facility]HA 4 hours a day , 7 days week.     Patient was admitted for pericardial effusion. CT chest showed a large pericardial effusion and TTE showed no evidence of tamponade. CTA was negative for PE. Patient had pericardiocentesis (700 cc drained) and IR placed a drain. Repeat TTE showed resolution of pericardial effusion. IR removed drain 8/16. Patient remained saturating well on 2L NC of O2. Patient continued on lasix. Patient was found with altered mental status, most likely due to delirium vs urinary tract infection. UA was positive and patient completed a course of CTX. LE duplex was negative for DVT. CTH/C spine was negative and X ray of knee/pelvis/ankle were negative. PT recommended rehab, but family wants home. Patient will be sent home on oxygen.     On 8/17/2021 this case was reviewed with Dr. Perry, the patient is medically stable and optimized for discharge. All medications were reviewed and prescriptions were sent to mutually agreed upon pharmacy.

## 2021-08-04 NOTE — DISCHARGE NOTE PROVIDER - NSDCCPCAREPLAN_GEN_ALL_CORE_FT
PRINCIPAL DISCHARGE DIAGNOSIS  Diagnosis: Pericardial effusion  Assessment and Plan of Treatment: You were fund to have a large pericardial effusion (fluid around the heart) which was causing cardiac tamponade (compression of the heart). A pericardiocentesis was performed where about 700 mL of fluid was removed from the sac around the heart and a drain was placed to help facilitate flow of extra fluid. This drain was removed ______. Your repeat echocardiogram (ultrasound of the heart) showed that the fluid around your heart is no longer there. Please return to the hospital if you develop chest pain, shortness of breath or low blood pressure. Follow up with your primary care doctor within one week of discharge.      SECONDARY DISCHARGE DIAGNOSES  Diagnosis: Frequent falls  Assessment and Plan of Treatment: You have had multiple falls. Your CT scan of the head and cervical spine did not show any concerning findings and the xrays of your lower extremities did not show any fractures. Physical therapy recommended rehab however you are opting to be discharged home.    Diagnosis: Cellulitis  Assessment and Plan of Treatment: You were found to have cellulitis (skin infection) of your legs for which you were treated with IV antibiotics. Your infection has resolved. Your ultrasound of the legs was negative for any blood clots.    Diagnosis: Rib fractures  Assessment and Plan of Treatment: You were found to have fractures of the right 5th and 6th ribs. Take tylenol as needed for pain.     PRINCIPAL DISCHARGE DIAGNOSIS  Diagnosis: Pericardial effusion  Assessment and Plan of Treatment: You were fund to have a large pericardial effusion (fluid around the heart) which was causing cardiac tamponade (compression of the heart). A pericardiocentesis was performed where about 700 mL of fluid was removed from the sac around the heart and a drain was placed to help facilitate flow of extra fluid. This drain was removed ______. Your repeat echocardiogram (ultrasound of the heart) showed that the fluid around your heart is no longer there. Please return to the hospital if you develop chest pain, shortness of breath or low blood pressure. Follow up with your primary care doctor within one week of discharge.      SECONDARY DISCHARGE DIAGNOSES  Diagnosis: Acute respiratory failure with hypoxia and hypercapnia  Assessment and Plan of Treatment: Your oxygen saturation was found to be low due your pericardial effusion. You now require home oxygen. Please use as ordered and follow up closely with your PCP and pulmonary team.    Diagnosis: Frequent falls  Assessment and Plan of Treatment: You have had multiple falls. Your CT scan of the head and cervical spine did not show any concerning findings and the xrays of your lower extremities did not show any fractures. Physical therapy recommended rehab however you are opting to be discharged home.    Diagnosis: Cellulitis  Assessment and Plan of Treatment: You were found to have cellulitis (skin infection) of your legs for which you were treated with IV antibiotics. Your infection has resolved. Your ultrasound of the legs was negative for any blood clots.    Diagnosis: Rib fractures  Assessment and Plan of Treatment: You were found to have fractures of the right 5th and 6th ribs. Take tylenol as needed for pain.     PRINCIPAL DISCHARGE DIAGNOSIS  Diagnosis: Pericardial effusion  Assessment and Plan of Treatment: You were fund to have a large pericardial effusion (fluid around the heart) which was causing cardiac tamponade (compression of the heart). A pericardiocentesis was performed where about 700 mL of fluid was removed from the sac around the heart and a drain was placed to help facilitate flow of extra fluid. This drain was removed 8/16/21. Your repeat echocardiogram (ultrasound of the heart) showed that the fluid around your heart is no longer there. Please return to the hospital if you develop chest pain, shortness of breath or low blood pressure. Follow up with your primary care doctor within one week of discharge.      SECONDARY DISCHARGE DIAGNOSES  Diagnosis: Frequent falls  Assessment and Plan of Treatment: You have had multiple falls. Your CT scan of the head and cervical spine did not show any concerning findings and the xrays of your lower extremities did not show any fractures. Physical therapy recommended rehab however you are opting to be discharged home.    Diagnosis: Rib fractures  Assessment and Plan of Treatment: You were found to have fractures of the right 5th and 6th ribs. Take tylenol as needed for pain.    Diagnosis: Cellulitis  Assessment and Plan of Treatment: You were found to have cellulitis (skin infection) of your legs for which you were treated with IV antibiotics. Your infection has resolved. Your ultrasound of the legs was negative for any blood clots.    Diagnosis: Acute respiratory failure with hypoxia and hypercapnia  Assessment and Plan of Treatment: Your oxygen saturation was found to be low due your pericardial effusion. You now require home oxygen. Please use as ordered and follow up closely with your PCP and pulmonary team.

## 2021-08-05 NOTE — PROGRESS NOTE ADULT - PROBLEM SELECTOR PLAN 2
Acute respiratory failure 2/2 to pericardial tamponade.  - patient w/ increasing dyspnea 7/21, placed on BIPAP. CXR w/ pleural effusions L>R. Patient s/p 20mg IV push of lasix 7/21. Patient now able to be placed NC however w/ some mild tachypnea.   - D-dimer elevated. CTA performed -> w/o evidence of PE  - patient last echo-> normal left ventricular systolic function, large pericardial effusion, right ventricular diastolic collapse is present , consistent w/ tamponade.   - patient s/p pericardiocentesis w/ 700 cc drained now w/ chest tube to water seal   - f/u IR recs, f/u cards recs; patient w/ bilateral effusions unclear etiology, pulm consulted for assistance--> unable to perform diagnostic thoracentesis effusions too small. Started po 20mg Lasix daily 7/22.  - repeat echo 7/22, 8/1 W/ resolution of pericardial effusion. EF wnl. repeat echo pending

## 2021-08-05 NOTE — PROGRESS NOTE ADULT - SUBJECTIVE AND OBJECTIVE BOX
[facility] Division of [facility] Medicine  Avila Whitehead MD  Pager [phone]      Patient is a 89y old  Female who presents with a chief complaint of Frequent Falls, concern for Cellulitis. (04 Aug 2021 13:42)      SUBJECTIVE / OVERNIGHT EVENTS:    No acute event. Pt offers no new complaint     ADDITIONAL REVIEW OF SYSTEMS:    RESPIRATORY: No cough, wheezing, chills or hemoptysis; No shortness of breath  CARDIOVASCULAR: No chest pain, palpitations, dizziness, or leg swelling  GASTROINTESTINAL: No abdominal or epigastric pain. No nausea, vomiting, or hematemesis; No diarrhea or constipation. No melena or hematochezia.      MEDICATIONS  (STANDING):  amLODIPine   Tablet 10 milliGRAM(s) Oral daily  aspirin enteric coated 81 milliGRAM(s) Oral daily  furosemide    Tablet 20 milliGRAM(s) Oral daily  lidocaine   Patch 1 Patch Transdermal daily  multivitamin 1 Tablet(s) Oral daily  polyethylene glycol 3350 17 Gram(s) Oral daily  senna 2 Tablet(s) Oral at bedtime    MEDICATIONS  (PRN):  aluminum hydroxide/magnesium hydroxide/simethicone Suspension 30 milliLiter(s) Oral every 4 hours PRN Dyspepsia  melatonin 3 milliGRAM(s) Oral at bedtime PRN Insomnia      CAPILLARY BLOOD GLUCOSE        I&O's Summary    04 Aug 2021 07:01  -  05 Aug 2021 07:00  --------------------------------------------------------  IN: 150 mL / OUT: 904 mL / NET: -754 mL        PHYSICAL EXAM:  Vital Signs Last 24 Hrs  T(C): 36.3 (05 Aug 2021 13:56), Max: 37.1 (05 Aug 2021 09:30)  T(F): 97.3 (05 Aug 2021 13:56), Max: 98.7 (05 Aug 2021 09:30)  HR: 71 (05 Aug 2021 13:56) (60 - 71)  BP: 119/58 (05 Aug 2021 13:56) (107/60 - 124/55)  BP(mean): --  RR: 16 (05 Aug 2021 13:56) (16 - 17)  SpO2: 98% (05 Aug 2021 13:56) (95% - 100%)    CONSTITUTIONAL: NAD,  EYES: PERRLA; conjunctiva and sclera clear  ENMT: Moist oral mucosa, no pharyngeal injection or exudates;   NECK: Supple, no palpable masses;  RESPIRATORY: Normal respiratory effort; b/l crackles bilaterally  CARDIOVASCULAR: Regular rate and rhythm, normal S1 and S2, no murmur/rub/gallop; No lower extremity edema; Peripheral pulses are 2+ bilaterally. pericardial drain in place   ABDOMEN: Nontender to palpation, normoactive bowel sounds, no rebound/guarding;   MUSCLOSKELETAL:   no clubbing or cyanosis of digits; no joint swelling or tenderness to palpation  PSYCH: A+O to person, place, and time; affect appropriate  NEUROLOGY: CN 2-12 are intact and symmetric; no gross sensory deficits;   SKIN: chronic LE venous stasis changes     LABS:                        10.5   5.78  )-----------( 183      ( 05 Aug 2021 06:16 )             32.5     08-05    132<L>  |  93<L>  |  23  ----------------------------<  112<H>  4.4   |  30  |  0.82    Ca    8.6      05 Aug 2021 06:16  Phos  3.2     08-05  Mg     2.40     08-05                  RADIOLOGY & ADDITIONAL TESTS:  Results Reviewed:   Imaging Personally Reviewed:  Electrocardiogram Personally Reviewed:    COORDINATION OF CARE:  Care Discussed with Consultants/Other Providers [Y/N]:  Prior or Outpatient Records Reviewed [Y/N]:

## 2021-08-05 NOTE — PROVIDER CONTACT NOTE (MEDICATION) - RECOMMENDATIONS
Continue to educate Pt on the importance of adhering to medication regimen and monitor bowel movements.

## 2021-08-06 NOTE — PROGRESS NOTE ADULT - PROBLEM SELECTOR PLAN 2
Acute respiratory failure 2/2 to pericardial tamponade.  - patient w/ increasing dyspnea 7/21, placed on BIPAP. CXR w/ pleural effusions L>R. Patient s/p 20mg IV push of lasix 7/21. Patient now able to be placed NC however w/ some mild tachypnea.   - D-dimer elevated. CTA performed -> w/o evidence of PE  - patient last echo-> normal left ventricular systolic function, large pericardial effusion, right ventricular diastolic collapse is present , consistent w/ tamponade.   - patient s/p pericardiocentesis w/ 700 cc drained now w/ chest tube to water seal   - f/u IR recs, f/u cards recs; patient w/ bilateral effusions unclear etiology, pulm consulted for assistance--> unable to perform diagnostic thoracentesis effusions too small. Started po 20mg Lasix daily 7/22.  - repeat echo 7/22, 8/1, 8/5 W/ resolution of pericardial effusion.

## 2021-08-06 NOTE — DISCHARGE NOTE NURSING/CASE MANAGEMENT/SOCIAL WORK - NSSCTYPOFSERV_GEN_ALL_CORE
RN PT SW & HHA; RN to visit day after discharge. PLEASE CONTACT HOME CARE AGENCY DIRECTLY FOR CONCERNS/QUESTIONS RE: HOME CARE SERVICES

## 2021-08-06 NOTE — PROGRESS NOTE ADULT - SUBJECTIVE AND OBJECTIVE BOX
[facility] Division of [facility] Medicine  Avila Whitehead MD  Pager [phone]      Patient is a 89y old  Female who presents with a chief complaint of Frequent Falls, concern for Cellulitis. (05 Aug 2021 14:36)      SUBJECTIVE / OVERNIGHT EVENTS:    No fever o/n. No new complaint     ADDITIONAL REVIEW OF SYSTEMS:    RESPIRATORY: No cough, wheezing, chills or hemoptysis; No shortness of breath  CARDIOVASCULAR: No chest pain, palpitations, dizziness, or leg swelling  GASTROINTESTINAL: No abdominal or epigastric pain. No nausea, vomiting, or hematemesis; No diarrhea or constipation. No melena or hematochezia.      MEDICATIONS  (STANDING):  amLODIPine   Tablet 10 milliGRAM(s) Oral daily  aspirin enteric coated 81 milliGRAM(s) Oral daily  furosemide    Tablet 20 milliGRAM(s) Oral daily  lidocaine   Patch 1 Patch Transdermal daily  multivitamin 1 Tablet(s) Oral daily  polyethylene glycol 3350 17 Gram(s) Oral daily  senna 2 Tablet(s) Oral at bedtime    MEDICATIONS  (PRN):  aluminum hydroxide/magnesium hydroxide/simethicone Suspension 30 milliLiter(s) Oral every 4 hours PRN Dyspepsia  melatonin 3 milliGRAM(s) Oral at bedtime PRN Insomnia      CAPILLARY BLOOD GLUCOSE        I&O's Summary    05 Aug 2021 07:01  -  06 Aug 2021 07:00  --------------------------------------------------------  IN: 400 mL / OUT: 710 mL / NET: -310 mL    06 Aug 2021 07:01  -  06 Aug 2021 13:43  --------------------------------------------------------  IN: 200 mL / OUT: 410 mL / NET: -210 mL        PHYSICAL EXAM:  Vital Signs Last 24 Hrs  T(C): 36.6 (06 Aug 2021 12:00), Max: 36.6 (06 Aug 2021 12:00)  T(F): 97.8 (06 Aug 2021 12:00), Max: 97.8 (06 Aug 2021 12:00)  HR: 62 (06 Aug 2021 12:00) (62 - 71)  BP: 120/58 (06 Aug 2021 12:00) (113/43 - 132/53)  BP(mean): --  RR: 17 (06 Aug 2021 12:00) (16 - 18)  SpO2: 94% (06 Aug 2021 12:00) (94% - 99%)    CONSTITUTIONAL: NAD,  EYES: PERRLA; conjunctiva and sclera clear  ENMT: Moist oral mucosa, no pharyngeal injection or exudates;   NECK: Supple, no palpable masses;  RESPIRATORY: Normal respiratory effort; b/l crackles bilaterally  CARDIOVASCULAR: Regular rate and rhythm, normal S1 and S2, no murmur/rub/gallop; No lower extremity edema; Peripheral pulses are 2+ bilaterally. pericardial drain in place   ABDOMEN: Nontender to palpation, normoactive bowel sounds, no rebound/guarding;   MUSCLOSKELETAL:   no clubbing or cyanosis of digits; no joint swelling or tenderness to palpation  PSYCH: A+O to person, place, and time; affect appropriate  NEUROLOGY: CN 2-12 are intact and symmetric; no gross sensory deficits;   SKIN: chronic LE venous stasis changes     LABS:                        10.8   5.16  )-----------( 169      ( 06 Aug 2021 06:25 )             33.2     08-06    132<L>  |  94<L>  |  22  ----------------------------<  113<H>  4.3   |  31  |  0.84    Ca    8.9      06 Aug 2021 06:25  Phos  3.3     08-06  Mg     2.40     08-06                  RADIOLOGY & ADDITIONAL TESTS:  Results Reviewed:   Imaging Personally Reviewed:  Electrocardiogram Personally Reviewed:    COORDINATION OF CARE:  Care Discussed with Consultants/Other Providers [Y/N]:  Prior or Outpatient Records Reviewed [Y/N]:

## 2021-08-06 NOTE — DISCHARGE NOTE NURSING/CASE MANAGEMENT/SOCIAL WORK - NSDCCRNAME_GEN_ALL_CORE_FT
[facility]  Private hire lists of aide agencies also provided (attached to ambulance & MOLST forms) [facility]  Private hire lists of aide agencies also provided (attached to ambulance & MOLST forms)  [facility]

## 2021-08-06 NOTE — DISCHARGE NOTE NURSING/CASE MANAGEMENT/SOCIAL WORK - NSDCCRTYPESERV_GEN_ALL_CORE_FT
ADL assistance ADL assistance  Aide to start Sunday 8/8/21 @2pm  Ambulance for 12noon pickup Sunday 8/8/21 ADL assistance  Aide to start Sunday 8/17/21 @2pm  Ambulance for 3:00PM; trip #[id number] pickup Tuesday 8/17/21

## 2021-08-06 NOTE — DISCHARGE NOTE NURSING/CASE MANAGEMENT/SOCIAL WORK - PATIENT PORTAL LINK FT
You can access the FollowMyHealth Patient Portal offered by [facility] by registering at the following website: [email or web address]. By joining [employer]s FollowMyHealth portal, you will also be able to view your health information using other applications (apps) compatible with our system.

## 2021-08-06 NOTE — DISCHARGE NOTE NURSING/CASE MANAGEMENT/SOCIAL WORK - CASE MANAGER'S NAME
----- Message from Atif Murdock MD sent at 1/11/2018  5:02 PM CST -----  Cr stable, improved.   Glucose NL.   LFTs NL.   Cbc NL.   Triglyceride level is slight high. Patient to reduce saturated fat in diet.     
.Attempted to call patient, no answer. left vm to return office call.     
Left a message on PT voicemail to give our office a call back.  
Pt aware. No further questions or concerns.     Diet mailed.   
WILBERT Chaves RN CCM

## 2021-08-06 NOTE — DISCHARGE NOTE NURSING/CASE MANAGEMENT/SOCIAL WORK - NSDCDMETYPESERV_GEN_ALL_CORE_FT
Oxygen, semi-electrical hospital bed & commode to be delivered to the home Friday/Saturday; 8/6-8/7/21 Oxygen, semi-electrical hospital bed & commode to be delivered to the home Friday 8/7/21 Oxygen, semi-electrical hospital bed & commode already delivered to the home Friday 8/7/21. NEW ORDER FOR WHEELCHAIR RECENTLY SUBMITTED; PLEASE FOLLOW-UP WITH [employer] RE: INSURANCE APPROVAL & DELIVERY DATE Oxygen, semi-electrical hospital bed & commode already delivered to the home Friday 8/7/21. NEW ORDER FOR WHEELCHAIR RECENTLY SUBMITTED; WHEELCHAIR DUE TO BE DELIVERED TO HOME TOMORROW WEDNESDAY AFTER 3PM

## 2021-08-06 NOTE — DISCHARGE NOTE NURSING/CASE MANAGEMENT/SOCIAL WORK - NSSCCARECORD_GEN_ALL_CORE
Home Care Agency/Durable Medical Equipment Agency Home Care Agency/Durable Medical Equipment Agency/[facility]

## 2021-08-07 NOTE — PROGRESS NOTE ADULT - PROBLEM SELECTOR PLAN 1
CT Chest with large pericardial effusion.   Admission ECHO w/ evidence of tamponade   S/p pericardiocentesis 700cc drained, chest tube to water seal. Repeat echo w/ resolution of pericardial effusion.   Telemetry monitoring for now.   monitor output from pericardial drain. f/u IR re: when can remove (?<10cc output/24 hours?, they will re-evaluate on Monday)

## 2021-08-07 NOTE — PROGRESS NOTE ADULT - SUBJECTIVE AND OBJECTIVE BOX
[facility] Medicine  Pager [phone]    Patient is a 89y old  Female who presents with a chief complaint of Frequent Falls, concern for Cellulitis. (06 Aug 2021 13:42)      INTERVAL HPI/OVERNIGHT EVENTS:    MEDICATIONS  (STANDING):  amLODIPine   Tablet 10 milliGRAM(s) Oral daily  aspirin enteric coated 81 milliGRAM(s) Oral daily  benzocaine 15 mG/menthol 3.6 mG (Sugar-Free) Lozenge 1 Lozenge Oral three times a day  furosemide    Tablet 20 milliGRAM(s) Oral daily  lidocaine   Patch 1 Patch Transdermal daily  multivitamin 1 Tablet(s) Oral daily  polyethylene glycol 3350 17 Gram(s) Oral daily  senna 2 Tablet(s) Oral at bedtime    MEDICATIONS  (PRN):  acetaminophen   Tablet .. 650 milliGRAM(s) Oral every 6 hours PRN Mild Pain (1 - 3), Moderate Pain (4 - 6), Severe Pain (7 - 10)  aluminum hydroxide/magnesium hydroxide/simethicone Suspension 30 milliLiter(s) Oral every 4 hours PRN Dyspepsia  melatonin 3 milliGRAM(s) Oral at bedtime PRN Insomnia      Allergies    No Known Allergies    Intolerances        REVIEW OF SYSTEMS:  Please see interval HPI:    Vital Signs Last 24 Hrs  T(C): 36.4 (07 Aug 2021 12:21), Max: 36.6 (06 Aug 2021 22:55)  T(F): 97.6 (07 Aug 2021 12:21), Max: 97.8 (06 Aug 2021 22:55)  HR: 67 (07 Aug 2021 12:21) (67 - 72)  BP: 121/60 (07 Aug 2021 12:21) (121/60 - 125/62)  BP(mean): --  RR: 18 (07 Aug 2021 12:21) (18 - 18)  SpO2: 97% (07 Aug 2021 12:21) (96% - 100%)  I&O's Detail    06 Aug 2021 07:01  -  07 Aug 2021 07:00  --------------------------------------------------------  IN:    Oral Fluid: 200 mL  Total IN: 200 mL    OUT:    Chest Tube (mL): 40 mL    Voided (mL): 400 mL  Total OUT: 440 mL    Total NET: -240 mL            PHYSICAL EXAM:  GENERAL:   HEAD:    EYES:   ENMT:   NECK:   NERVOUS SYSTEM:    CHEST/LUNG:   HEART:   ABDOMEN:   EXTREMITIES:    LYMPH:   SKIN:     LABS:                        10.8   5.16  )-----------( 179      ( 07 Aug 2021 07:06 )             33.2     07 Aug 2021 07:06    131    |  93     |  22     ----------------------------<  106    4.5     |  27     |  0.84     Ca    9.0        07 Aug 2021 07:06  Phos  3.6       07 Aug 2021 07:06  Mg     2.40      07 Aug 2021 07:06        CAPILLARY BLOOD GLUCOSE        BLOOD CULTURE    RADIOLOGY & ADDITIONAL TESTS:    Imaging Personally Reviewed:  [ ] YES     Consultant(s) Notes Reviewed:      Care Discussed with Consultants/Other Providers: abdoulaye [facility] Medicine  Pager [phone]    Patient is a 89y old  Female who presents with a chief complaint of Frequent Falls, concern for Cellulitis. (06 Aug 2021 13:42)      INTERVAL HPI/OVERNIGHT EVENTS:  Reports had a bad night, couldn't sleep, felt cold. Offered additional blanket. Still w/ drain in place, understands plan for IR re-eval of otpt on Monday. Patient reveals that she is a Holocaust survivor, and that her son is disabled. No other complaints at this time.     MEDICATIONS  (STANDING):  amLODIPine   Tablet 10 milliGRAM(s) Oral daily  aspirin enteric coated 81 milliGRAM(s) Oral daily  benzocaine 15 mG/menthol 3.6 mG (Sugar-Free) Lozenge 1 Lozenge Oral three times a day  furosemide    Tablet 20 milliGRAM(s) Oral daily  lidocaine   Patch 1 Patch Transdermal daily  multivitamin 1 Tablet(s) Oral daily  polyethylene glycol 3350 17 Gram(s) Oral daily  senna 2 Tablet(s) Oral at bedtime    MEDICATIONS  (PRN):  acetaminophen   Tablet .. 650 milliGRAM(s) Oral every 6 hours PRN Mild Pain (1 - 3), Moderate Pain (4 - 6), Severe Pain (7 - 10)  aluminum hydroxide/magnesium hydroxide/simethicone Suspension 30 milliLiter(s) Oral every 4 hours PRN Dyspepsia  melatonin 3 milliGRAM(s) Oral at bedtime PRN Insomnia      Allergies  No Known Allergies    Intolerances    REVIEW OF SYSTEMS:  Please see interval HPI:    Vital Signs Last 24 Hrs  T(C): 36.4 (07 Aug 2021 12:21), Max: 36.6 (06 Aug 2021 22:55)  T(F): 97.6 (07 Aug 2021 12:21), Max: 97.8 (06 Aug 2021 22:55)  HR: 67 (07 Aug 2021 12:21) (67 - 72)  BP: 121/60 (07 Aug 2021 12:21) (121/60 - 125/62)  RR: 18 (07 Aug 2021 12:21) (18 - 18)  SpO2: 97% (07 Aug 2021 12:21) (96% - 100%)  I&O's Detail    06 Aug 2021 07:01  -  07 Aug 2021 07:00  --------------------------------------------------------  IN:    Oral Fluid: 200 mL  Total IN: 200 mL    OUT:    Chest Tube (mL): 40 mL    Voided (mL): 400 mL  Total OUT: 440 mL    Total NET: -240 mL    PHYSICAL EXAM:  GENERAL: Elderly female sleeping in bed, but arousable to voice  HEAD:  NC/AT  EYES: EOMI, clear sclera/conjunctiva  ENMT: MMM  NECK: supple  NERVOUS SYSTEM: moving extremities, conversant    CHEST/LUNG: clear anteriorly, no respiratory distress  HEART: S1S2 RRR,+pericardial drain in place   ABDOMEN: soft, non-tender  EXTREMITIES:  no c/c/e  SKIN: chronic LE venous stasis changes    LABS:                        10.8   5.16  )-----------( 179      ( 07 Aug 2021 07:06 )             33.2     07 Aug 2021 07:06    131    |  93     |  22     ----------------------------<  106    4.5     |  27     |  0.84     Ca    9.0        07 Aug 2021 07:06  Phos  3.6       07 Aug 2021 07:06  Mg     2.40      07 Aug 2021 07:06    CAPILLARY BLOOD GLUCOSE    BLOOD CULTURE    RADIOLOGY & ADDITIONAL TESTS:    Imaging Personally Reviewed:  [ ] YES     Consultant(s) Notes Reviewed:      Care Discussed with Consultants/Other Providers: KARLO Merrill re: IR plan to reassess drain Monday, possible d/c home w/ HHA once drain able to be removed

## 2021-08-07 NOTE — PROGRESS NOTE ADULT - PROBLEM SELECTOR PLAN 2
Acute respiratory failure 2/2 to pericardial tamponade.  - patient w/ increasing dyspnea 7/21, placed on BIPAP. CXR w/ pleural effusions L>R. Patient s/p 20mg IV push of lasix 7/21. Patient now able to be placed NC however w/ some mild tachypnea.   - D-dimer elevated. CTA performed -> w/o evidence of PE  - patient last echo-> normal left ventricular systolic function, large pericardial effusion, right ventricular diastolic collapse is present , consistent w/ tamponade.   - patient s/p pericardiocentesis w/ 700 cc drained now w/ chest tube to water seal   - appreciate cards/IR/pulm assistance--> unable to perform diagnostic thoracentesis effusions too small. Started po 20mg Lasix daily 7/22.  - repeat echo 7/22, 8/1, 8/5 W/ resolution of pericardial effusion.  - c/w diuresis, wean O2 as tolerated

## 2021-08-07 NOTE — PROGRESS NOTE ADULT - PROBLEM SELECTOR PLAN 7
DVT ppx: has not been on pharmacological VTE ppx ?setting of pericardial effusion? can do SCDs...   Dispo: ?home w/ HHA services, pending clearance by IR/removal of pericardial drain  GOC: DNR/DNI, MOLST form completed, decision maker: patient.

## 2021-08-07 NOTE — PROGRESS NOTE ADULT - PROBLEM SELECTOR PLAN 4
In the setting of bilateral LE swelling, chronic venous stasis dermatitis. resolved  s/p Ceftriaxone, s/p IV Unasyn for 7 days. monitor off further abx  BCx NGTD   Doppler negative for DVT

## 2021-08-07 NOTE — PROGRESS NOTE ADULT - ASSESSMENT
89 yo F w/ HTN, who presented to ED for repeated falls, found to have 5th/6th rib fractures, incidental pericardial effusion, and cellulitis- admitted for pain control and IV Antibiotics, course c/b acute respiratory failure 2/2 pericardial tamponade s/p IR drain.

## 2021-08-07 NOTE — PROGRESS NOTE ADULT - PROBLEM SELECTOR PLAN 5
CT Head/CT Cervical Spine with no acute pathology.   with rib fractures as noted above  X-rays Ankle, Knee, Pelvis, Tib/Fib negative for acute pathology.   Pain control, Tylenol prn. Hot/Cold packs. Lidocaine patch.   Incentive Spirometry.  Fall Risk Protocol, PT evaluation--> AILYN. discussions ongoing but patient/family goal is home w/ HHA services  B12, Folate, TSH wnl

## 2021-08-07 NOTE — PROGRESS NOTE ADULT - PROBLEM SELECTOR PLAN 3
Chest X-ray: Rt sided fractures, CT Chest: Acute 5th and 6th Rib fractures.  Pain control, Hot/Cold packs.   Incentive Spirometry.  tylenol prn  add lidocaine patch to the R chest

## 2021-08-08 NOTE — PROGRESS NOTE ADULT - SUBJECTIVE AND OBJECTIVE BOX
[facility] Medicine  Pager [phone]    Patient is a 89y old  Female who presents with a chief complaint of Frequent Falls, concern for Cellulitis. (07 Aug 2021 14:15)      INTERVAL HPI/OVERNIGHT EVENTS:    MEDICATIONS  (STANDING):  amLODIPine   Tablet 10 milliGRAM(s) Oral daily  aspirin enteric coated 81 milliGRAM(s) Oral daily  benzocaine 15 mG/menthol 3.6 mG (Sugar-Free) Lozenge 1 Lozenge Oral three times a day  furosemide    Tablet 20 milliGRAM(s) Oral daily  lidocaine   Patch 1 Patch Transdermal daily  multivitamin 1 Tablet(s) Oral daily  polyethylene glycol 3350 17 Gram(s) Oral daily  senna 2 Tablet(s) Oral at bedtime    MEDICATIONS  (PRN):  acetaminophen   Tablet .. 650 milliGRAM(s) Oral every 6 hours PRN Mild Pain (1 - 3), Moderate Pain (4 - 6), Severe Pain (7 - 10)  aluminum hydroxide/magnesium hydroxide/simethicone Suspension 30 milliLiter(s) Oral every 4 hours PRN Dyspepsia  melatonin 3 milliGRAM(s) Oral at bedtime PRN Insomnia      Allergies    No Known Allergies    Intolerances        REVIEW OF SYSTEMS:  Please see interval HPI:    Vital Signs Last 24 Hrs  T(C): 36.3 (08 Aug 2021 12:02), Max: 36.6 (07 Aug 2021 21:16)  T(F): 97.4 (08 Aug 2021 12:02), Max: 97.8 (07 Aug 2021 21:16)  HR: 66 (08 Aug 2021 12:02) (62 - 66)  BP: 114/53 (08 Aug 2021 12:02) (114/53 - 130/60)  BP(mean): --  RR: 36 (08 Aug 2021 12:02) (18 - 36)  SpO2: 100% (08 Aug 2021 12:02) (99% - 100%)  I&O's Detail    07 Aug 2021 07:01  -  08 Aug 2021 07:00  --------------------------------------------------------  IN:    Oral Fluid: 240 mL  Total IN: 240 mL    OUT:    Chest Tube (mL): 20 mL    Voided (mL): 500 mL  Total OUT: 520 mL    Total NET: -280 mL            PHYSICAL EXAM:  GENERAL:   HEAD:    EYES:   ENMT:   NECK:   NERVOUS SYSTEM:    CHEST/LUNG:   HEART:   ABDOMEN:   EXTREMITIES:    LYMPH:   SKIN:     LABS:      Ca    9.0        07 Aug 2021 07:06        CAPILLARY BLOOD GLUCOSE        BLOOD CULTURE    RADIOLOGY & ADDITIONAL TESTS:    Imaging Personally Reviewed:  [ ] YES     Consultant(s) Notes Reviewed:      Care Discussed with Consultants/Other Providers: [facility] Medicine  Pager [phone]    Patient is a 89y old  Female who presents with a chief complaint of Frequent Falls, concern for Cellulitis. (07 Aug 2021 14:15)    INTERVAL HPI/OVERNIGHT EVENTS:  Reports trouble sleeping, is hard to adapt to being in hospital, even things like having bowel movement is different. Denies chest pain/SOB, is hopeful that drain can come out soon, would like to be out of hospital. Discussed plan for IR re-eval Monday, goal output <10 cc for removal (was 20 cc last 24 hours).     Remarks upon flowers on the window sill, how they brighter up her room.     MEDICATIONS  (STANDING):  amLODIPine   Tablet 10 milliGRAM(s) Oral daily  aspirin enteric coated 81 milliGRAM(s) Oral daily  benzocaine 15 mG/menthol 3.6 mG (Sugar-Free) Lozenge 1 Lozenge Oral three times a day  furosemide    Tablet 20 milliGRAM(s) Oral daily  lidocaine   Patch 1 Patch Transdermal daily  multivitamin 1 Tablet(s) Oral daily  polyethylene glycol 3350 17 Gram(s) Oral daily  senna 2 Tablet(s) Oral at bedtime    MEDICATIONS  (PRN):  acetaminophen   Tablet .. 650 milliGRAM(s) Oral every 6 hours PRN Mild Pain (1 - 3), Moderate Pain (4 - 6), Severe Pain (7 - 10)  aluminum hydroxide/magnesium hydroxide/simethicone Suspension 30 milliLiter(s) Oral every 4 hours PRN Dyspepsia  melatonin 3 milliGRAM(s) Oral at bedtime PRN Insomnia    Allergies  No Known Allergies    Intolerances    REVIEW OF SYSTEMS:  Please see interval HPI:    Vital Signs Last 24 Hrs  T(C): 36.3 (08 Aug 2021 12:02), Max: 36.6 (07 Aug 2021 21:16)  T(F): 97.4 (08 Aug 2021 12:02), Max: 97.8 (07 Aug 2021 21:16)  HR: 66 (08 Aug 2021 12:02) (62 - 66)  BP: 114/53 (08 Aug 2021 12:02) (114/53 - 130/60)  BP(mean): --  RR: 36 (08 Aug 2021 12:02) (18 - 36)  SpO2: 100% (08 Aug 2021 12:02) (99% - 100%)  I&O's Detail    07 Aug 2021 07:01  -  08 Aug 2021 07:00  --------------------------------------------------------  IN:    Oral Fluid: 240 mL  Total IN: 240 mL    OUT:    Chest Tube (mL): 20 mL    Voided (mL): 500 mL  Total OUT: 520 mL    Total NET: -280 mL    PHYSICAL EXAM:  GENERAL: NAD, elderly female, lying in bed, arousable to voice  HEAD:  NC/AT  EYES: EOMI, clear sclera/conjunctiva  ENMT: MMM  NECK: supple  NERVOUS SYSTEM: moving extremities, conversant    CHEST/LUNG: clear anteriorly no respiratory distress, NC in place  HEART: S1S2 RRR +pericardial drain  ABDOMEN: soft, non-tender  EXTREMITIES:  no c/c/e  SKIN: chronic LE venous stasis changes    LABS:                        10.8   5.16  )-----------( 179      ( 07 Aug 2021 07:06 )             33.2     08-07    131<L>  |  93<L>  |  22  ----------------------------<  106<H>  4.5   |  27  |  0.84    Ca    9.0      07 Aug 2021 07:06  Phos  3.6     08-07  Mg     2.40     08-07      CAPILLARY BLOOD GLUCOSE    BLOOD CULTURE    RADIOLOGY & ADDITIONAL TESTS:    Imaging Personally Reviewed:  [ ] YES     Consultant(s) Notes Reviewed:      Care Discussed with Consultants/Other Providers: KARLO Rodriguez re: monitoring drain output, plan for IR re-eval in AM, goal home w/ HHA services once drain removed, given age/son disabled, unclear if patient would be able to manage drain at home

## 2021-08-08 NOTE — PROGRESS NOTE ADULT - PROBLEM SELECTOR PLAN 3
Chest X-ray: Rt sided fractures, CT Chest: Acute 5th and 6th Rib fractures.  Pain control, Hot/Cold packs.   Incentive Spirometry.  tylenol prn  c/w lidocaine patch to the R chest

## 2021-08-08 NOTE — PROGRESS NOTE ADULT - PROBLEM SELECTOR PLAN 1
CT Chest with large pericardial effusion.   Admission ECHO w/ evidence of tamponade   S/p pericardiocentesis 700cc drained, chest tube to water seal. Repeat echo w/ resolution of pericardial effusion.   Telemetry monitoring for now.   monitor output from pericardial drain. f/u IR re: when can remove (?<10cc output/24 hours?, they will re-evaluate on Monday), patient w/ 20 cc output/24 hours from drain on 8/8.

## 2021-08-08 NOTE — PROGRESS NOTE ADULT - ASSESSMENT
89 yo F w/ HTN, who presented to ED for repeated falls, found to have 5th/6th rib fractures, incidental pericardial effusion, and cellulitis- admitted for pain control and IV Antibiotics, course c/b acute respiratory failure 2/2 pericardial tamponade s/p IR drain.

## 2021-08-09 NOTE — PROGRESS NOTE ADULT - PROBLEM SELECTOR PLAN 2
Oxygen saturations stable on 2L NC  - secondary to pericardial tamponade - management as above  - CTAP negative for PE, TTE with normal LV function, RV collapse i/s/o pericardial tamponade now improved post pericardiocentesis and drain   - bilateral pleural effusions - pulm unable to perform thoracentesis as effusions too small, started on Lasix 20mg PO daily per cardiology   - monitor oxygen saturations, set up for home oxygen

## 2021-08-09 NOTE — PROGRESS NOTE ADULT - PROBLEM SELECTOR PLAN 6
Stable  - at home ?losartan - has been held since admission, started on amlodipine 10mg daily   - BPs well controlled on amlodipine 10mg daily   - monitor vital signs Stable  - at home on ?losartan - has been held since admission, started on amlodipine 10mg daily   - BPs well controlled on amlodipine 10mg daily   - monitor vital signs

## 2021-08-09 NOTE — PROGRESS NOTE ADULT - PROBLEM SELECTOR PLAN 5
Presented with frequent falls at home  - CTH/C-spine negative, XRAY Knee/Pelvis/Ankle negative, rib fractures as above   - fall precautions, PT recommended AILYN however family wants home with HHA

## 2021-08-09 NOTE — PROGRESS NOTE ADULT - ASSESSMENT
89F with hx of HTN who p/w repeated falls, found to have 5th/6th rib fractures, incidental pericardial effusion and cellulitis with course c/b acute respiratory failure 2/2 pericardial tamponade s/p IR drain.  89F, DNR/DNI, with hx of HTN who p/w repeated falls, found to have 5th/6th rib fractures, incidental pericardial effusion and cellulitis with course c/b acute respiratory failure 2/2 pericardial tamponade s/p IR drain.

## 2021-08-09 NOTE — PROGRESS NOTE ADULT - SUBJECTIVE AND OBJECTIVE BOX
Ria Perry MD  [facility] Division [facility] Medicine  Pager [phone] (M-F 8AM-5PM)  Other Times: [phone]    Patient is a 89y old  Female who presents with a chief complaint of Frequent Falls, concern for Cellulitis. (08 Aug 2021 12:36)    SUBJECTIVE / OVERNIGHT EVENTS: No acute events overnight. Wants go home. No CP/SOB.    MEDICATIONS  (STANDING):  amLODIPine   Tablet 10 milliGRAM(s) Oral daily  aspirin enteric coated 81 milliGRAM(s) Oral daily  benzocaine 15 mG/menthol 3.6 mG (Sugar-Free) Lozenge 1 Lozenge Oral three times a day  furosemide    Tablet 20 milliGRAM(s) Oral daily  lidocaine   Patch 1 Patch Transdermal daily  multivitamin 1 Tablet(s) Oral daily  polyethylene glycol 3350 17 Gram(s) Oral daily  senna 2 Tablet(s) Oral at bedtime    MEDICATIONS  (PRN):  acetaminophen   Tablet .. 650 milliGRAM(s) Oral every 6 hours PRN Mild Pain (1 - 3), Moderate Pain (4 - 6), Severe Pain (7 - 10)  aluminum hydroxide/magnesium hydroxide/simethicone Suspension 30 milliLiter(s) Oral every 4 hours PRN Dyspepsia  melatonin 3 milliGRAM(s) Oral at bedtime PRN Insomnia    PHYSICAL EXAM:  Vital Signs Last 24 Hrs  T(C): 36.7 (09 Aug 2021 05:40), Max: 36.8 (08 Aug 2021 21:00)  T(F): 98 (09 Aug 2021 05:40), Max: 98.2 (08 Aug 2021 21:00)  HR: 68 (09 Aug 2021 05:40) (65 - 70)  BP: 126/56 (09 Aug 2021 05:40) (125/62 - 130/59)  RR: 18 (09 Aug 2021 05:40) (18 - 18)  SpO2: 100% (09 Aug 2021 05:40) (100% - 100%)    CONSTITUTIONAL: NAD, well-developed, well-groomed  RESPIRATORY: Normal respiratory effort; lungs are clear to auscultation bilaterally  CARDIOVASCULAR: Regular rate and rhythm, normal S1 and S2, no murmur/rub/gallop; No lower extremity edema +pericardial drain   GASTROINTESTINAL: Nontender to palpation, normoactive bowel sounds, no rebound/guarding; No hepatosplenomegaly  MUSCULOSKELETAL:  no clubbing or cyanosis of digits; no joint swelling or tenderness to palpation  NEUROLOGY: non-focal; no gross sensory deficits   PSYCH: A+O to person, place, and time; affect appropriate  SKIN: No rashes; warm     LABS:                        10.0   4.54  )-----------( 165      ( 09 Aug 2021 07:19 )             31.3     08-09    135  |  95<L>  |  18  ----------------------------<  93  4.2   |  29  |  0.74    Ca    8.8      09 Aug 2021 07:19  Phos  3.5     08-09  Mg     2.20     08-09    RADIOLOGY & ADDITIONAL TESTS:  Results Reviewed:   Imaging Personally Reviewed:  Electrocardiogram Personally Reviewed:    COORDINATION OF CARE:  Care Discussed with Consultants/Other Providers [Y/N]: KARLO Rodriguez reached out to IR - IR to assess if drain can be pulled  Prior or Outpatient Records Reviewed [Y/N]:

## 2021-08-09 NOTE — PROGRESS NOTE ADULT - PROBLEM SELECTOR PLAN 1
Saturating well on 2LNC, BPs stable  - CT Chest with large pericardial effusion, TTE with e/o of tamponade   - s/p pericardiocentesis (700cc drained), now with drain, repeat echo w/ resolution of pericardial effusion  - monitor drain output, f/u IR regarding drain removal (<10cc/24hours)  - c/w telemetry monitoring

## 2021-08-09 NOTE — PROGRESS NOTE ADULT - NSICDXPILOT_GEN_ALL_CORE
[place]

## 2021-08-09 NOTE — CHART NOTE - NSCHARTNOTEFT_GEN_A_CORE
Nutrition services f/u.      89F with hx of HTN who p/w repeated falls, found to have 5th/6th rib fractures, incidental pericardial effusion and cellulitis with course c/b acute respiratory failure 2/2 pericardial tamponade s/p IR drain. (Hospitalist Attending note 8/9/21)    Patient sleeping at time of visit. Spoke to RN for collateral who reported patient tolerating diet without any issues. Per meal intake record, patient mostly eating >75% of meals.      Diet, Dysphagia 2 Mechanical Soft-Thin Liquids (07-20-21 @ 00:10)    Current Weight: No new weights to assess, 7/16 - 75.9kg      Pertinent Medications:   amLODIPine   Tablet  furosemide    Tablet  multivitamin  polyethylene glycol 3350  senna    Pertinent Labs:  08-09 Na135 mmol/L Glu 93 mg/dL K+ 4.2 mmol/L Cr  0.74 mg/dL BUN 18 mg/dL 08-09 Phos 3.5 mg/dL    Skin: 2+ edema noted to feet, ankles, legs; no pressure injuries at this time     Estimated Needs: Refer to initial dietitian evaluation  [ X ] no change since previous assessment  [ ] recalculated:     Previous Nutrition Diagnosis:   Increased nutrient needs - resolved    Additional Recommendations:  1) Continue current diet as ordered;  2) Continue Multivitamin tablet daily;  3) Assist with meals as needed;  4) Please obtain and record current weights.    Dorita Win MS, RDN, CDN  Pager [phone]

## 2021-08-09 NOTE — PROGRESS NOTE ADULT - PROBLEM SELECTOR PLAN 7
DVT ppx: SCDs holding pharmacological ppx i/s/o pericardial effusion  GOC: DNR/DNI, MOLST form completed, decision maker: patient  Dispo: Home with HHA and Oxygen, pending IR removal of pericardial drain

## 2021-08-10 NOTE — PROGRESS NOTE ADULT - SUBJECTIVE AND OBJECTIVE BOX
Ria Perry MD  [facility] Division of Hospital Medicine  Pager [phone] (M-F 8AM-5PM)  Other Times: [phone]    Patient is a 89y old  Female who presents with a chief complaint of Frequent Falls, concern for Cellulitis. (09 Aug 2021 12:17)    SUBJECTIVE / OVERNIGHT EVENTS: Patient seen and examined at bedside. Denies cp/sob.     MEDICATIONS  (STANDING):  amLODIPine   Tablet 10 milliGRAM(s) Oral daily  aspirin enteric coated 81 milliGRAM(s) Oral daily  benzocaine 15 mG/menthol 3.6 mG (Sugar-Free) Lozenge 1 Lozenge Oral three times a day  furosemide    Tablet 20 milliGRAM(s) Oral daily  lidocaine   Patch 1 Patch Transdermal daily  multivitamin 1 Tablet(s) Oral daily  polyethylene glycol 3350 17 Gram(s) Oral daily  senna 2 Tablet(s) Oral at bedtime    MEDICATIONS  (PRN):  acetaminophen   Tablet .. 650 milliGRAM(s) Oral every 6 hours PRN Mild Pain (1 - 3), Moderate Pain (4 - 6), Severe Pain (7 - 10)  aluminum hydroxide/magnesium hydroxide/simethicone Suspension 30 milliLiter(s) Oral every 4 hours PRN Dyspepsia  melatonin 3 milliGRAM(s) Oral at bedtime PRN Insomnia      PHYSICAL EXAM:  Vital Signs Last 24 Hrs  T(C): 36.4 (10 Aug 2021 05:00), Max: 36.4 (09 Aug 2021 21:00)  T(F): 97.5 (10 Aug 2021 05:00), Max: 97.6 (09 Aug 2021 21:00)  HR: 75 (10 Aug 2021 05:00) (62 - 78)  BP: 127/58 (10 Aug 2021 05:00) (111/54 - 127/58)  BP(mean): --  RR: 20 (10 Aug 2021 05:00) (18 - 20)  SpO2: 95% (10 Aug 2021 05:00) (95% - 98%)    CONSTITUTIONAL: NAD, well-developed, well-groomed  RESPIRATORY: Normal respiratory effort; lungs are clear to auscultation bilaterally  CARDIOVASCULAR: Regular rate and rhythm, normal S1 and S2, no murmur/rub/gallop; No lower extremity edema +pericardial drain   GASTROINTESTINAL: Nontender to palpation, normoactive bowel sounds, no rebound/guarding; No hepatosplenomegaly  MUSCULOSKELETAL:  no clubbing or cyanosis of digits; no joint swelling or tenderness to palpation  NEUROLOGY: non-focal; no gross sensory deficits   PSYCH: A+O to person, place, and time; affect appropriate  SKIN: No rashes; warm     LABS:                        10.3   5.06  )-----------( 153      ( 10 Aug 2021 08:05 )             31.8     08-10    133<L>  |  94<L>  |  18  ----------------------------<  106<H>  4.2   |  29  |  0.73    Ca    9.0      10 Aug 2021 08:05  Phos  3.3     08-10  Mg     2.20     08-10                  RADIOLOGY & ADDITIONAL TESTS:  Results Reviewed:   Imaging Personally Reviewed:  Electrocardiogram Personally Reviewed:    COORDINATION OF CARE:  Care Discussed with Consultants/Other Providers [Y/N]:  Prior or Outpatient Records Reviewed [Y/N]:

## 2021-08-10 NOTE — PROGRESS NOTE ADULT - PROBLEM SELECTOR PLAN 6
Stable  - at home on ?losartan - has been held since admission, started on amlodipine 10mg daily   - BPs well controlled on amlodipine 10mg daily   - monitor vital signs

## 2021-08-10 NOTE — PROGRESS NOTE ADULT - ASSESSMENT
89F, DNR/DNI, with hx of HTN who p/w repeated falls, found to have 5th/6th rib fractures, incidental pericardial effusion and cellulitis with course c/b acute respiratory failure 2/2 pericardial tamponade s/p IR drain.

## 2021-08-10 NOTE — PROGRESS NOTE ADULT - PROBLEM SELECTOR PLAN 1
Saturating well on 2LNC, BPs stable  - CT Chest with large pericardial effusion, TTE with e/o of tamponade   - s/p pericardiocentesis (700cc drained), now with drain, repeat echo w/ resolution of pericardial effusion  - monitor drain output, IR recommending removal once draining <10cc/24hours  - c/w telemetry monitoring

## 2021-08-11 NOTE — PROVIDER CONTACT NOTE (CHANGE IN STATUS NOTIFICATION) - SITUATION
Pt. desat to 87% on 2 L nasal cannula, pt. tachypneic to 22 breaths/minute upon assessment. Pt. states she is anxious and does not know where she is (A&Ox2/confused to situation and place). Pt. baseline is A&Ox4. Pt. is responsive/alert, arouses to voice, denies chest pain.

## 2021-08-11 NOTE — PROVIDER CONTACT NOTE (CHANGE IN STATUS NOTIFICATION) - ASSESSMENT
Pt. stated she did not know where she was and was "scared." Pt. agitated and attempted to get out of bed. Pt. now A&Ox2, confused to situation/time. Oxygen was increased to 3L (as originally ordered), pt. now satting 92-95% on continuous pulse ox. Pt has no IV inserted, NANDO Rose made aware of this due to it being taken out by previous day shift. Chest tube intact/drainage output= 50 mL.

## 2021-08-11 NOTE — PROVIDER CONTACT NOTE (CHANGE IN STATUS NOTIFICATION) - ACTION/TREATMENT ORDERED:
CBC, BMP and VBG ordered STAT and sent (@01:45). Urgent chest x-ray ordered (@02:03). IM furosemide given as per orders.   Oxygen was increased to 3L nasal cannula until pt. no longer SOB/tachypneic. Pt. changed back to 2 L NC at 0500 and is currently tolerating it well.   Continue to monitor mental status changes; pt. made an EC due to confusion and attempting to climb out of bed.

## 2021-08-11 NOTE — CHART NOTE - NSCHARTNOTEFT_GEN_A_CORE
Patient has a large pericardial effusion, cardiac tamponade, s/p pericardiocentesis and has acute right sided rib fractures (5th and 6th ribs) as a result of recent fall. Patient demonstrates decreased mobility, endurance and sitting balance. Patient will require a standard wheelchair with elevated leg rests, seatbelt, brake extensions, anti-tippers and foam cushion to achieve daily tasks and therapies cannot be achieved with a walker, cane, or crutches. Patient will be utilizing wheelchair to go to medical appointments and within the home.

## 2021-08-11 NOTE — CHART NOTE - NSCHARTNOTEFT_GEN_A_CORE
Notified by RN patient desated to 88 on 2 L NC when an episode of confusion occurred when patient woke up from sleep around 1 am. Patient placed on 3L NC at the time and sating now > 95%. Patient evaluated at bedside by provider, patient now A&O x 4 and acknowledges episode of confusion. States patient started to panic when unable to identify surroundings. States shortness of breath has resolved and patient feeling better. Notified by RN patient de-sated to 88 on 2 L NC when an episode of confusion occurred when patient woke up from sleep around 1 am. Patient placed on 3L NC at the time and sating now > 95%. Patient evaluated at bedside by provider, patient now A&O x 4 and acknowledges episode of confusion stating that she was disoriented 2/2 to being woken up from sleep. States started to panic when unable to identify surroundings. States shortness of breath has resolved and patient feeling better.  Denies chest pain, abdominal pain, numbness/tingling of the extremities, changes in vision, disorientation.    Vital Signs Last 24 Hrs  T(C): 36.3 (10 Aug 2021 21:00), Max: 36.7 (10 Aug 2021 13:00)  T(F): 97.3 (10 Aug 2021 21:00), Max: 98 (10 Aug 2021 13:00)  HR: 77 (10 Aug 2021 21:00) (68 - 77)  BP: 132/84 (10 Aug 2021 21:00) (113/59 - 132/84)  BP(mean): --  RR: 20 (10 Aug 2021 21:00) (20 - 20)  SpO2: 94% (10 Aug 2021 21:00) (94% - 96%)    Constitutional: NAD, well-developed, well-nourished  Neck: supple, no JVD  Respiratory: +Wheezes auscultated RL Lung   Cardiovascular: regular rate and rhythm, S1 and S2, no murmurs, rubs or gallops, no edema, 2+ peripheral pulses  Gastrointestinal: soft, nontender, nondistended, +bowel sounds, no hernia  Skin: warm, dry, no rash  Neurologic: sensation grossly intact, CN grossly intact, non-focal exam    Plan:   -Hypoxia may be 2/2 JHONNY  -Continue with pulse oximetry   -CXR- assess pulm edema/pleural effusions    -Lasix 40 mg IM   -Duoneb prn - crackles auscultated on exam   -CBC, VBG, CMP   -continue with 3L NC and de-escalate as tolerated   -continue to monitor    NANDO May  Department of Medicine   In House # [id number]

## 2021-08-11 NOTE — PROGRESS NOTE ADULT - PROBLEM SELECTOR PLAN 1
Saturating well on 2LNC, BPs stable  - CT Chest with large pericardial effusion, TTE with e/o of tamponade   - s/p pericardiocentesis (700cc drained), now with drain, repeat echo w/ resolution of pericardial effusion  - monitor drain output - currently 50cc, IR recommending removal once draining <10cc/24hours  - c/w telemetry monitoring

## 2021-08-11 NOTE — PROGRESS NOTE ADULT - SUBJECTIVE AND OBJECTIVE BOX
Ria Perry MD  [facility] Division of Hospital Medicine  Pager [phone] (M-F 8AM-5PM)  Other Times: [phone]    Patient is a 89y old  Female who presents with a chief complaint of Frequent Falls, concern for Cellulitis. (10 Aug 2021 12:42)    SUBJECTIVE / OVERNIGHT EVENTS: Patient seen and examined at bedside. Episode of AMS overnight, now resolved. Denies cp and SOB.     MEDICATIONS  (STANDING):  amLODIPine   Tablet 10 milliGRAM(s) Oral daily  aspirin enteric coated 81 milliGRAM(s) Oral daily  benzocaine 15 mG/menthol 3.6 mG (Sugar-Free) Lozenge 1 Lozenge Oral three times a day  furosemide    Tablet 20 milliGRAM(s) Oral daily  lidocaine   Patch 1 Patch Transdermal daily  multivitamin 1 Tablet(s) Oral daily  polyethylene glycol 3350 17 Gram(s) Oral daily  senna 2 Tablet(s) Oral at bedtime    MEDICATIONS  (PRN):  acetaminophen   Tablet .. 650 milliGRAM(s) Oral every 6 hours PRN Mild Pain (1 - 3), Moderate Pain (4 - 6), Severe Pain (7 - 10)  albuterol/ipratropium for Nebulization. 3 milliLiter(s) Nebulizer once PRN Shortness of Breath  aluminum hydroxide/magnesium hydroxide/simethicone Suspension 30 milliLiter(s) Oral every 4 hours PRN Dyspepsia  melatonin 3 milliGRAM(s) Oral at bedtime PRN Insomnia      PHYSICAL EXAM:  Vital Signs Last 24 Hrs  T(C): 36.4 (11 Aug 2021 05:00), Max: 36.7 (11 Aug 2021 01:40)  T(F): 97.5 (11 Aug 2021 05:00), Max: 98 (11 Aug 2021 01:40)  HR: 68 (11 Aug 2021 05:00) (68 - 77)  BP: 136/64 (11 Aug 2021 05:00) (121/58 - 136/64)  RR: 20 (11 Aug 2021 05:00) (20 - 22)  SpO2: 97% (11 Aug 2021 05:00) (94% - 97%)    CONSTITUTIONAL: NAD, well-developed, well-groomed  RESPIRATORY: Normal respiratory effort; lungs are clear to auscultation bilaterally  CARDIOVASCULAR: Regular rate and rhythm, normal S1 and S2, no murmur/rub/gallop; No lower extremity edema, pericardial drain in place   GASTROINTESTINAL: Nontender to palpation, normoactive bowel sounds, no rebound/guarding; No hepatosplenomegaly  MUSCULOSKELETAL:  no clubbing or cyanosis of digits; no joint swelling or tenderness to palpation  NEUROLOGY: non-focal; no gross sensory deficits   PSYCH: A+O to person, place, and time; affect appropriate  SKIN: No rashes; warm     LABS:                        10.6   4.59  )-----------( 156      ( 11 Aug 2021 02:05 )             32.4     08-11    133<L>  |  93<L>  |  18  ----------------------------<  116<H>  4.5   |  30  |  0.73    Ca    9.0      11 Aug 2021 02:05  Phos  3.4     08-11  Mg     2.10     08-11    TPro  5.9<L>  /  Alb  3.1<L>  /  TBili  0.4  /  DBili  x   /  AST  16  /  ALT  12  /  AlkPhos  93  08-11                RADIOLOGY & ADDITIONAL TESTS:  Results Reviewed:   Imaging Personally Reviewed:  Electrocardiogram Personally Reviewed:    COORDINATION OF CARE:  Care Discussed with Consultants/Other Providers [Y/N]:  Prior or Outpatient Records Reviewed [Y/N]:

## 2021-08-11 NOTE — PROVIDER CONTACT NOTE (CHANGE IN STATUS NOTIFICATION) - BACKGROUND
89 yr old F, admitted w/ pericardial effusion, acute respiratory failure w/ hypoxia and hypercapnia. Had repeated falls prior to hospital.   s/p percardicentesis 7/20 & s/p pericardial drain to water seal.

## 2021-08-12 NOTE — PROGRESS NOTE ADULT - PROBLEM SELECTOR PLAN 3
Right 5th/6th rib fractures after fall   - lidocaine patch, Tylenol PRN for pain Oxygen saturations stable on 2L NC  - secondary to pericardial tamponade - management as above  - CTAP negative for PE, TTE with normal LV function, RV collapse i/s/o pericardial tamponade now improved post pericardiocentesis and drain   - bilateral pleural effusions - pulm unable to perform thoracentesis as effusions too small, started on Lasix 20mg PO daily per cardiology   - monitor oxygen saturations, set up for home oxygen

## 2021-08-12 NOTE — PROGRESS NOTE ADULT - PROBLEM SELECTOR PLAN 6
Stable  - at home on ?losartan - has been held since admission, started on amlodipine 10mg daily   - BPs well controlled on amlodipine 10mg daily   - monitor vital signs Presented with frequent falls at home  - CTH/C-spine negative, XRAY Knee/Pelvis/Ankle negative, rib fractures as above   - fall precautions, PT recommended AILYN however family wants home with HHA

## 2021-08-12 NOTE — PROGRESS NOTE ADULT - PROBLEM SELECTOR PLAN 2
Oxygen saturations stable on 2L NC  - secondary to pericardial tamponade - management as above  - CTAP negative for PE, TTE with normal LV function, RV collapse i/s/o pericardial tamponade now improved post pericardiocentesis and drain   - bilateral pleural effusions - pulm unable to perform thoracentesis as effusions too small, started on Lasix 20mg PO daily per cardiology   - monitor oxygen saturations, set up for home oxygen Waxing and waning mental status (baseline AAOx3)  - hospital induced delirium given prolonged stay vs. UTI  - UA positive, send UCx, empiric CTX pending cultures  - VBG with hypercapnia to 60s - however this is patient's baseline  - frequent reorientation

## 2021-08-12 NOTE — PROGRESS NOTE ADULT - SUBJECTIVE AND OBJECTIVE BOX
Pt. examined at bedside  s/p pericardial drain aom 7/19  no complaints offered          Vital Signs Last 24 Hrs  T(C): 36.6 (12 Aug 2021 14:39), Max: 36.6 (12 Aug 2021 14:39)  T(F): 97.8 (12 Aug 2021 14:39), Max: 97.8 (12 Aug 2021 14:39)  HR: 68 (12 Aug 2021 14:39) (64 - 72)  BP: 122/62 (12 Aug 2021 14:39) (122/62 - 135/52)  BP(mean): --  RR: 20 (12 Aug 2021 14:39) (20 - 22)  SpO2: 96% (12 Aug 2021 14:39) (96% - 100%)    Focused Exam findings:    General: NAD  Chest: chest tube in place site c/d/i  output blood tinged  50cc/24h          LABS:                        10.7   4.80  )-----------( 141      ( 12 Aug 2021 13:17 )             33.0     08-12    134<L>  |  93<L>  |  16  ----------------------------<  85  4.3   |  29  |  0.72    Ca    9.0      12 Aug 2021 13:17  Phos  3.4     08-11  Mg     2.10     08-11    TPro  5.9<L>  /  Alb  3.1<L>  /  TBili  0.4  /  DBili  x   /  AST  16  /  ALT  12  /  AlkPhos  93  08-11    I&O's Detail

## 2021-08-12 NOTE — PROGRESS NOTE ADULT - SUBJECTIVE AND OBJECTIVE BOX
Ria Perry MD  [facility] Division of Hospital Medicine  Pager [phone] (M-F 8AM-5PM)  Other Times: [phone]    Patient is a 89y old  Female who presents with a chief complaint of Frequent Falls, concern for Cellulitis. (11 Aug 2021 13:31)    SUBJECTIVE / OVERNIGHT EVENTS: No acute events overnight. Denies chest pain/sob.     MEDICATIONS  (STANDING):  amLODIPine   Tablet 10 milliGRAM(s) Oral daily  aspirin enteric coated 81 milliGRAM(s) Oral daily  benzocaine 15 mG/menthol 3.6 mG (Sugar-Free) Lozenge 1 Lozenge Oral three times a day  furosemide    Tablet 20 milliGRAM(s) Oral daily  lidocaine   Patch 1 Patch Transdermal daily  multivitamin 1 Tablet(s) Oral daily  polyethylene glycol 3350 17 Gram(s) Oral daily  senna 2 Tablet(s) Oral at bedtime    MEDICATIONS  (PRN):  acetaminophen   Tablet .. 650 milliGRAM(s) Oral every 6 hours PRN Mild Pain (1 - 3), Moderate Pain (4 - 6), Severe Pain (7 - 10)  aluminum hydroxide/magnesium hydroxide/simethicone Suspension 30 milliLiter(s) Oral every 4 hours PRN Dyspepsia  melatonin 3 milliGRAM(s) Oral at bedtime PRN Insomnia      PHYSICAL EXAM:  Vital Signs Last 24 Hrs  T(C): 36.3 (12 Aug 2021 06:39), Max: 36.3 (11 Aug 2021 13:00)  T(F): 97.3 (12 Aug 2021 06:39), Max: 97.4 (11 Aug 2021 22:30)  HR: 64 (12 Aug 2021 06:39) (64 - 72)  BP: 135/52 (12 Aug 2021 06:39) (121/65 - 135/52)  RR: 20 (12 Aug 2021 06:39) (20 - 22)  SpO2: 97% (12 Aug 2021 06:39) (97% - 100%)    CONSTITUTIONAL: NAD, well-developed, well-groomed  RESPIRATORY: Normal respiratory effort; lungs are clear to auscultation bilaterally  CARDIOVASCULAR: Regular rate and rhythm, normal S1 and S2, no murmur/rub/gallop; No lower extremity edema  GASTROINTESTINAL: Nontender to palpation, normoactive bowel sounds, no rebound/guarding; No hepatosplenomegaly  MUSCULOSKELETAL:  no clubbing or cyanosis of digits; no joint swelling or tenderness to palpation  NEUROLOGY: non-focal; no gross sensory deficits   PSYCH: A+O to person, place, and time; affect appropriate  SKIN: No rashes; warm     LABS:                        10.6   4.59  )-----------( 156      ( 11 Aug 2021 02:05 )             32.4     08-11    133<L>  |  93<L>  |  18  ----------------------------<  116<H>  4.5   |  30  |  0.73    Ca    9.0      11 Aug 2021 02:05  Phos  3.4     08-11  Mg     2.10     08-11    TPro  5.9<L>  /  Alb  3.1<L>  /  TBili  0.4  /  DBili  x   /  AST  16  /  ALT  12  /  AlkPhos  93  08-11                RADIOLOGY & ADDITIONAL TESTS:  Results Reviewed:   Imaging Personally Reviewed:  Electrocardiogram Personally Reviewed:    COORDINATION OF CARE:  Care Discussed with Consultants/Other Providers [Y/N]:  Prior or Outpatient Records Reviewed [Y/N]:

## 2021-08-12 NOTE — PROGRESS NOTE ADULT - PROBLEM SELECTOR PLAN 4
Resolved   - s/p CTX and Unasyn x 7 days  - LE duplex negative for DVT Right 5th/6th rib fractures after fall   - lidocaine patch, Tylenol PRN for pain

## 2021-08-12 NOTE — PROGRESS NOTE ADULT - ASSESSMENT
89 year old female with pericardial effusion s/p drain placement on 7/19 still with high output    Plan:  continue drainage   monitor output  recommend thoracic surgery consult

## 2021-08-12 NOTE — PROGRESS NOTE ADULT - PROBLEM SELECTOR PLAN 1
Saturating well on 2LNC, BPs stable  - CT Chest with large pericardial effusion, TTE with e/o of tamponade   - s/p pericardiocentesis (700cc drained), now with drain, repeat echo w/ resolution of pericardial effusion  - monitor drain output - currently 50cc, IR recommending removal once draining <10cc/24hours  - c/w telemetry monitoring Saturating well on 2LNC, BPs stable  - CT Chest with large pericardial effusion, TTE with e/o of tamponade   - s/p pericardiocentesis (700cc drained), now with drain, repeat echo w/ resolution of pericardial effusion  - monitor drain output - currently 50cc, IR recommending removal once draining <10cc/24hours  - 8/12: drain output noted to be more sanguineous than baseline, Hb stable, vital stable, will repeat TTE, IR to come evaluate drain   - c/w telemetry monitoring

## 2021-08-12 NOTE — PROGRESS NOTE ADULT - PROBLEM SELECTOR PLAN 7
DVT ppx: SCDs holding pharmacological ppx i/s/o pericardial effusion  GOC: DNR/DNI, MOLST form completed, decision maker: patient  Dispo: Home with HHA and Oxygen, pending IR removal of pericardial drain Stable  - at home on ?losartan - has been held since admission, started on amlodipine 10mg daily   - BPs well controlled on amlodipine 10mg daily   - monitor vital signs

## 2021-08-12 NOTE — PROGRESS NOTE ADULT - PROBLEM SELECTOR PLAN 5
Presented with frequent falls at home  - CTH/C-spine negative, XRAY Knee/Pelvis/Ankle negative, rib fractures as above   - fall precautions, PT recommended AILYN however family wants home with HHA Resolved   - s/p CTX and Unasyn x 7 days  - LE duplex negative for DVT

## 2021-08-13 NOTE — CONSULT NOTE ADULT - TIME BILLING
high complexity of this case
Medical management as above, review of results/records, discussion with patient and primary team.

## 2021-08-13 NOTE — PROGRESS NOTE ADULT - PROBLEM SELECTOR PLAN 1
Saturating well on 2LNC, BPs stable  - CT Chest with large pericardial effusion, TTE with e/o of tamponade   - s/p pericardiocentesis (700cc drained), now with drain, repeat echo w/ resolution of pericardial effusion  - monitor drain output - currently 50cc, IR recommending removal once draining <10cc/24hours  - 8/12: drain output noted to be more sanguineous than baseline, Hb stable, vital stable, repeat TTE unchanged, IR evaluated - recommending thoracic surgery eval (recs pending)  - c/w telemetry monitoring

## 2021-08-13 NOTE — CONSULT NOTE ADULT - REASON FOR ADMISSION
Frequent Falls, concern for Cellulitis.

## 2021-08-13 NOTE — PROGRESS NOTE ADULT - PROBLEM SELECTOR PLAN 2
Waxing and waning mental status (baseline AAOx3)  - hospital induced delirium given prolonged stay vs. UTI  - UA positive, send UCx, empiric CTX pending cultures  - VBG with hypercapnia to 60s - however this is patient's baseline  - frequent reorientation

## 2021-08-13 NOTE — CONSULT NOTE ADULT - SUBJECTIVE AND OBJECTIVE BOX
HPI:  HPI: History obtained directly from patient who is alert/oriented x4. A 89F PMH of HTN, HLD, Chronic Venous Stasis Dermatitis, Pericardial Effusion who presents to ED for repeated falls over the past week, last fall last night, pt states she lost her footing and landed on the couch, was able to right herself up but was unable to get out of bed, pt has been feeling weak and having leg weakness x weeks. Pt also endorses L ankle pain since the fall last night, as per daughter in room pt had wound which was being treated on R leg but unsure of recent swelling. Pt denies any recent/current symptoms of shortness of breath, chest pain, abdominal pain, nausea, vomiting, dizziness, paplitations, urinary frequency, denies fevers/chills, nausea/vomiting. Patient states that she is independent in most activities of daily living and ambulates with a cane and at times "crawls" up the stairs to get to the 2nd level. Receives HHA 4 hours a day , 7 days week.    (2021 18:15)    PT found to have Pericardial tamponade   - s/p percardial drain placement by IR   tube remains on waterseal with increased output    Final Diagnosis  PERICARDIAL FLUID  NEGATIVE FOR MALIGNANT CELLS.          PAST MEDICAL & SURGICAL HISTORY:  HTN (Hypertension), HLD, Chronic Venous Stasis Dermatitis, Pericardial Effusion, frequent falls        Review of Systems:   CONSTITUTIONAL: No fever, weight loss, or fatigue  EYES: No eye pain, visual disturbances, or discharge  ENMT:  No difficulty hearing, tinnitus, vertigo; No sinus or throat pain  NECK: No pain or stiffness  RESPIRATORY: No cough, wheezing, chills or hemoptysis; No shortness of breath  CARDIOVASCULAR: No chest pain, palpitations, dizziness, or leg swelling  GASTROINTESTINAL: No abdominal or epigastric pain. No nausea, vomiting, or hematemesis; No diarrhea or constipation. No melena or hematochezia.  GENITOURINARY: No dysuria, frequency, hematuria, or incontinence  NEUROLOGICAL: No headaches, memory loss, loss of strength, numbness, or tremors  SKIN: No itching, burning, rashes, or lesions   LYMPH NODES: No enlarged glands  ENDOCRINE: No heat or cold intolerance; No hair loss  MUSCULOSKELETAL: LE tenderness, R>L.   HEME/LYMPH: No easy bruising, or bleeding gums  ALLERY AND IMMUNOLOGIC: No hives or eczema        MEDICATIONS  (STANDING):  amLODIPine   Tablet 10 milliGRAM(s) Oral daily  aspirin enteric coated 81 milliGRAM(s) Oral daily  benzocaine 15 mG/menthol 3.6 mG (Sugar-Free) Lozenge 1 Lozenge Oral three times a day  cefTRIAXone   IVPB 1000 milliGRAM(s) IV Intermittent every 24 hours  furosemide    Tablet 20 milliGRAM(s) Oral daily  lidocaine   Patch 1 Patch Transdermal daily  multivitamin 1 Tablet(s) Oral daily  polyethylene glycol 3350 17 Gram(s) Oral daily  senna 2 Tablet(s) Oral at bedtime    MEDICATIONS  (PRN):  acetaminophen   Tablet .. 650 milliGRAM(s) Oral every 6 hours PRN Mild Pain (1 - 3), Moderate Pain (4 - 6), Severe Pain (7 - 10)  albuterol/ipratropium for Nebulization 3 milliLiter(s) Nebulizer every 6 hours PRN Shortness of Breath and/or Wheezing  aluminum hydroxide/magnesium hydroxide/simethicone Suspension 30 milliLiter(s) Oral every 4 hours PRN Dyspepsia  melatonin 3 milliGRAM(s) Oral at bedtime PRN Insomnia      Allergies  No Known Allergies  Intolerances        SOCIAL HISTORY:  Lives at home with her son Izzy HATFIELD 4 hours a day/ 7 days week.   Ambulates with a cane, mostly independent in ADLs.   Denies any smoking, tobacco use, or illicit drug use.        FAMILY HISTORY:  No pertinent family history in first degree relatives  unless noted, no significant family hx with Mother, Father, Siblings          Vital Signs Last 24 Hrs  T(C): 36.3 (13 Aug 2021 06:04), Max: 36.6 (12 Aug 2021 14:39)  T(F): 97.3 (13 Aug 2021 06:04), Max: 97.8 (12 Aug 2021 14:39)  HR: 66 (13 Aug 2021 06:04) (66 - 68)  BP: 125/55 (13 Aug 2021 06:04) (117/50 - 125/55)  BP(mean): --  RR: 20 (13 Aug 2021 06:04) (20 - 20)  SpO2: 96% (13 Aug 2021 06:04) (96% - 97%)        PHYSICAL EXAM  GENERAL: NAD, elderly, frail, comfortable appearing.   HEAD:  Atraumatic, Normocephalic  EYES: EOMI, conjunctiva and sclera clear  NECK: Supple, No JVD  CHEST/LUNG: Clear to auscultation bilaterally; No wheeze  HEART: Regular rate and rhythm; No murmurs, rubs, or gallops  ABDOMEN: Soft, Nontender, Nondistended; Bowel sounds present  EXTREMITIES:  2+ Peripheral Pulses, No clubbing, cyanosis, or edema  NEUROLOGY: non-focal, alert and oriented x4, capacitated.   SKIN: No rashes or lesions            LABS:                        10.1   4.49  )-----------( 150      ( 13 Aug 2021 06:14 )             31.5     08-13    132<L>  |  93<L>  |  22  ----------------------------<  99  4.3   |  32<H>  |  0.80    Ca    8.7      13 Aug 2021 06:14  Phos  4.0     08-13  Mg     1.90     08-13        Urinalysis Basic - ( 12 Aug 2021 11:29 )    Color: Yellow / Appearance: Slightly Turbid / S.024 / pH: x  Gluc: x / Ketone: Negative  / Bili: Negative / Urobili: <2 mg/dL   Blood: x / Protein: 30 mg/dL / Nitrite: Negative   Leuk Esterase: Large / RBC: 2 /HPF / WBC 15 /HPF   Sq Epi: x / Non Sq Epi: x / Bacteria: Moderate      Final Diagnosis  PERICARDIAL FLUID  NEGATIVE FOR MALIGNANT CELLS.      RADIOLOGY & ADDITIONAL STUDIES:      Patient name: VAUGHN HOSKINS  YOB: 1924   Age: 89 (F)   MR#: [id number]  Study Date: 2021  Location: [id number]: MARLENA Enrique  Study quality: Technically good  Referring Physician: Ria Perry MD  Blood Pressure: 128/56 mmHg  Height: 165 cm  Weight: 77 kg  BSA: 1.9 m2  ------------------------------------------------------------------------  PROCEDURE: Limited transthoracic echocardiogram with 2-D.  M-Mode and spectral and color flow Doppler.  INDICATION: Cardiac tamponade (I31.4)  ------------------------------------------------------------------------  CONCLUSIONS:  Limited repeat study to re-evaluate pericardial effusion.  1. Small pericardial effusion posterior to the left  ventricle, adjacent to the RV free wall, and superior to  the right atrium.  2. Left pleural effusion.  *** Compared with echocardiogram of 2021, no  significant changes noted.  ------------------------------------------------------------------------  Confirmed on  2021 - 08:26:01 by Dax Adamson M.D.,  [facility], [facility]                ASSESSMENT:   89yFemalePAST MEDICAL & SURGICAL HISTORY:  HTN (Hypertension)  Squamous Cell Carcinoma of Skin of Face  removed from R cheek 4 years ago  Abnormality of femur    HEALTH ISSUES - PROBLEM Dx:  Pericardial effusion  Final Diagnosis  PERICARDIAL FLUID  NEGATIVE FOR MALIGNANT CELLS.      PLAN:  will review case with Dr Espinal  continue care per primary team    Ashley COLLIER [id number] HPI:  HPI: History obtained directly from patient who is alert/oriented x4. A 89F PMH of HTN, HLD, Chronic Venous Stasis Dermatitis, Pericardial Effusion who presents to ED for repeated falls over the past week, last fall last night, pt states she lost her footing and landed on the couch, was able to right herself up but was unable to get out of bed, pt has been feeling weak and having leg weakness x weeks. Pt also endorses L ankle pain since the fall last night, as per daughter in room pt had wound which was being treated on R leg but unsure of recent swelling. Pt denies any recent/current symptoms of shortness of breath, chest pain, abdominal pain, nausea, vomiting, dizziness, paplitations, urinary frequency, denies fevers/chills, nausea/vomiting. Patient states that she is independent in most activities of daily living and ambulates with a cane and at times "crawls" up the stairs to get to the 2nd level. Receives HHA 4 hours a day , 7 days week.    (2021 18:15)    PT found to have Pericardial tamponade   - s/p percardial drain placement by IR   tube remains on waterseal with increased output    Final Diagnosis  PERICARDIAL FLUID  NEGATIVE FOR MALIGNANT CELLS.          PAST MEDICAL & SURGICAL HISTORY:  HTN (Hypertension), HLD, Chronic Venous Stasis Dermatitis, Pericardial Effusion, frequent falls        Review of Systems:   CONSTITUTIONAL: No fever, weight loss, or fatigue  EYES: No eye pain, visual disturbances, or discharge  ENMT:  No difficulty hearing, tinnitus, vertigo; No sinus or throat pain  NECK: No pain or stiffness  RESPIRATORY: No cough, wheezing, chills or hemoptysis; No shortness of breath  CARDIOVASCULAR: No chest pain, palpitations, dizziness, or leg swelling  GASTROINTESTINAL: No abdominal or epigastric pain. No nausea, vomiting, or hematemesis; No diarrhea or constipation. No melena or hematochezia.  GENITOURINARY: No dysuria, frequency, hematuria, or incontinence  NEUROLOGICAL: No headaches, memory loss, loss of strength, numbness, or tremors  SKIN: No itching, burning, rashes, or lesions   LYMPH NODES: No enlarged glands  ENDOCRINE: No heat or cold intolerance; No hair loss  MUSCULOSKELETAL: LE tenderness, R>L.   HEME/LYMPH: No easy bruising, or bleeding gums  ALLERY AND IMMUNOLOGIC: No hives or eczema        MEDICATIONS  (STANDING):  amLODIPine   Tablet 10 milliGRAM(s) Oral daily  aspirin enteric coated 81 milliGRAM(s) Oral daily  benzocaine 15 mG/menthol 3.6 mG (Sugar-Free) Lozenge 1 Lozenge Oral three times a day  cefTRIAXone   IVPB 1000 milliGRAM(s) IV Intermittent every 24 hours  furosemide    Tablet 20 milliGRAM(s) Oral daily  lidocaine   Patch 1 Patch Transdermal daily  multivitamin 1 Tablet(s) Oral daily  polyethylene glycol 3350 17 Gram(s) Oral daily  senna 2 Tablet(s) Oral at bedtime    MEDICATIONS  (PRN):  acetaminophen   Tablet .. 650 milliGRAM(s) Oral every 6 hours PRN Mild Pain (1 - 3), Moderate Pain (4 - 6), Severe Pain (7 - 10)  albuterol/ipratropium for Nebulization 3 milliLiter(s) Nebulizer every 6 hours PRN Shortness of Breath and/or Wheezing  aluminum hydroxide/magnesium hydroxide/simethicone Suspension 30 milliLiter(s) Oral every 4 hours PRN Dyspepsia  melatonin 3 milliGRAM(s) Oral at bedtime PRN Insomnia      Allergies  No Known Allergies  Intolerances        SOCIAL HISTORY:  Lives at home with her son Izzy HATFIELD 4 hours a day/ 7 days week.   Ambulates with a cane, mostly independent in ADLs.   Denies any smoking, tobacco use, or illicit drug use.        FAMILY HISTORY:  No pertinent family history in first degree relatives  unless noted, no significant family hx with Mother, Father, Siblings          Vital Signs Last 24 Hrs  T(C): 36.3 (13 Aug 2021 06:04), Max: 36.6 (12 Aug 2021 14:39)  T(F): 97.3 (13 Aug 2021 06:04), Max: 97.8 (12 Aug 2021 14:39)  HR: 66 (13 Aug 2021 06:04) (66 - 68)  BP: 125/55 (13 Aug 2021 06:04) (117/50 - 125/55)  BP(mean): --  RR: 20 (13 Aug 2021 06:04) (20 - 20)  SpO2: 96% (13 Aug 2021 06:04) (96% - 97%)        PHYSICAL EXAM  GENERAL: NAD, elderly, frail, comfortable appearing.   HEAD:  Atraumatic, Normocephalic  EYES: EOMI, conjunctiva and sclera clear  NECK: Supple, No JVD  CHEST/LUNG: Clear to auscultation bilaterally; No wheeze  HEART: Regular rate and rhythm; No murmurs, rubs, or gallops  ABDOMEN: Soft, Nontender, Nondistended; Bowel sounds present  EXTREMITIES:  2+ Peripheral Pulses, No clubbing, cyanosis, or edema  NEUROLOGY: non-focal, alert and oriented x4, capacitated.   SKIN: No rashes or lesions            LABS:                        10.1   4.49  )-----------( 150      ( 13 Aug 2021 06:14 )             31.5     08-13    132<L>  |  93<L>  |  22  ----------------------------<  99  4.3   |  32<H>  |  0.80    Ca    8.7      13 Aug 2021 06:14  Phos  4.0     08-13  Mg     1.90     08-13        Urinalysis Basic - ( 12 Aug 2021 11:29 )    Color: Yellow / Appearance: Slightly Turbid / S.024 / pH: x  Gluc: x / Ketone: Negative  / Bili: Negative / Urobili: <2 mg/dL   Blood: x / Protein: 30 mg/dL / Nitrite: Negative   Leuk Esterase: Large / RBC: 2 /HPF / WBC 15 /HPF   Sq Epi: x / Non Sq Epi: x / Bacteria: Moderate      Final Diagnosis  PERICARDIAL FLUID  NEGATIVE FOR MALIGNANT CELLS.      RADIOLOGY & ADDITIONAL STUDIES:      Patient name: VAUGHN HOSKINS  YOB: 1924   Age: 89 (F)   MR#: [id number]  Study Date: 2021  Location: [id number]: MARLENA Enrique  Study quality: Technically good  Referring Physician: Ria Perry MD  Blood Pressure: 128/56 mmHg  Height: 165 cm  Weight: 77 kg  BSA: 1.9 m2  ------------------------------------------------------------------------  PROCEDURE: Limited transthoracic echocardiogram with 2-D.  M-Mode and spectral and color flow Doppler.  INDICATION: Cardiac tamponade (I31.4)  ------------------------------------------------------------------------  CONCLUSIONS:  Limited repeat study to re-evaluate pericardial effusion.  1. Small pericardial effusion posterior to the left  ventricle, adjacent to the RV free wall, and superior to  the right atrium.  2. Left pleural effusion.  *** Compared with echocardiogram of 2021, no  significant changes noted.  ------------------------------------------------------------------------  Confirmed on  2021 - 08:26:01 by Dax Adamson M.D.,  [facility], [facility]                ASSESSMENT:   89yFemalePAST MEDICAL & SURGICAL HISTORY:  HTN (Hypertension)  Squamous Cell Carcinoma of Skin of Face  removed from R cheek 4 years ago  Abnormality of femur    HEALTH ISSUES - PROBLEM Dx:  Pericardial effusion  Final Diagnosis  PERICARDIAL FLUID  NEGATIVE FOR MALIGNANT CELLS.      PLAN:  will review case with Dr Miner  continue care per primary team    Ashley COLLIER [id number] HPI:  HPI: History obtained directly from patient who is alert/oriented x4. A 89F PMH of HTN, HLD, Chronic Venous Stasis Dermatitis, Pericardial Effusion who presents to ED for repeated falls over the past week, last fall last night, pt states she lost her footing and landed on the couch, was able to right herself up but was unable to get out of bed, pt has been feeling weak and having leg weakness x weeks. Pt also endorses L ankle pain since the fall last night, as per daughter in room pt had wound which was being treated on R leg but unsure of recent swelling. Pt denies any recent/current symptoms of shortness of breath, chest pain, abdominal pain, nausea, vomiting, dizziness, paplitations, urinary frequency, denies fevers/chills, nausea/vomiting. Patient states that she is independent in most activities of daily living and ambulates with a cane and at times "crawls" up the stairs to get to the 2nd level. Receives HHA 4 hours a day , 7 days week.    (2021 18:15)    PT found to have Pericardial tamponade   - s/p percardial drain placement by IR   tube remains on waterseal with increased output    Final Diagnosis  PERICARDIAL FLUID  NEGATIVE FOR MALIGNANT CELLS.          PAST MEDICAL & SURGICAL HISTORY:  HTN (Hypertension), HLD, Chronic Venous Stasis Dermatitis, Pericardial Effusion, frequent falls        Review of Systems:   CONSTITUTIONAL: No fever, weight loss, or fatigue  EYES: No eye pain, visual disturbances, or discharge  ENMT:  No difficulty hearing, tinnitus, vertigo; No sinus or throat pain  NECK: No pain or stiffness  RESPIRATORY: No cough, wheezing, chills or hemoptysis; No shortness of breath  CARDIOVASCULAR: No chest pain, palpitations, dizziness, or leg swelling  GASTROINTESTINAL: No abdominal or epigastric pain. No nausea, vomiting, or hematemesis; No diarrhea or constipation. No melena or hematochezia.  GENITOURINARY: No dysuria, frequency, hematuria, or incontinence  NEUROLOGICAL: No headaches, memory loss, loss of strength, numbness, or tremors  SKIN: No itching, burning, rashes, or lesions   LYMPH NODES: No enlarged glands  ENDOCRINE: No heat or cold intolerance; No hair loss  MUSCULOSKELETAL: LE tenderness, R>L.   HEME/LYMPH: No easy bruising, or bleeding gums  ALLERY AND IMMUNOLOGIC: No hives or eczema        MEDICATIONS  (STANDING):  amLODIPine   Tablet 10 milliGRAM(s) Oral daily  aspirin enteric coated 81 milliGRAM(s) Oral daily  benzocaine 15 mG/menthol 3.6 mG (Sugar-Free) Lozenge 1 Lozenge Oral three times a day  cefTRIAXone   IVPB 1000 milliGRAM(s) IV Intermittent every 24 hours  furosemide    Tablet 20 milliGRAM(s) Oral daily  lidocaine   Patch 1 Patch Transdermal daily  multivitamin 1 Tablet(s) Oral daily  polyethylene glycol 3350 17 Gram(s) Oral daily  senna 2 Tablet(s) Oral at bedtime    MEDICATIONS  (PRN):  acetaminophen   Tablet .. 650 milliGRAM(s) Oral every 6 hours PRN Mild Pain (1 - 3), Moderate Pain (4 - 6), Severe Pain (7 - 10)  albuterol/ipratropium for Nebulization 3 milliLiter(s) Nebulizer every 6 hours PRN Shortness of Breath and/or Wheezing  aluminum hydroxide/magnesium hydroxide/simethicone Suspension 30 milliLiter(s) Oral every 4 hours PRN Dyspepsia  melatonin 3 milliGRAM(s) Oral at bedtime PRN Insomnia      Allergies  No Known Allergies  Intolerances        SOCIAL HISTORY:  Lives at home with her son Izzy HATFIELD 4 hours a day/ 7 days week.   Ambulates with a cane, mostly independent in ADLs.   Denies any smoking, tobacco use, or illicit drug use.        FAMILY HISTORY:  No pertinent family history in first degree relatives  unless noted, no significant family hx with Mother, Father, Siblings          Vital Signs Last 24 Hrs  T(C): 36.3 (13 Aug 2021 06:04), Max: 36.6 (12 Aug 2021 14:39)  T(F): 97.3 (13 Aug 2021 06:04), Max: 97.8 (12 Aug 2021 14:39)  HR: 66 (13 Aug 2021 06:04) (66 - 68)  BP: 125/55 (13 Aug 2021 06:04) (117/50 - 125/55)  BP(mean): --  RR: 20 (13 Aug 2021 06:04) (20 - 20)  SpO2: 96% (13 Aug 2021 06:04) (96% - 97%)        PHYSICAL EXAM  GENERAL: NAD, elderly, frail, comfortable appearing.   HEAD:  Atraumatic, Normocephalic  EYES: EOMI, conjunctiva and sclera clear  NECK: Supple, No JVD  CHEST/LUNG: Clear to auscultation bilaterally; No wheeze  HEART: Regular rate and rhythm; No murmurs, rubs, or gallops  ABDOMEN: Soft, Nontender, Nondistended; Bowel sounds present  EXTREMITIES:  2+ Peripheral Pulses, No clubbing, cyanosis, or edema  NEUROLOGY: non-focal, alert and oriented x4, capacitated.   SKIN: No rashes or lesions            LABS:                        10.1   4.49  )-----------( 150      ( 13 Aug 2021 06:14 )             31.5     08-13    132<L>  |  93<L>  |  22  ----------------------------<  99  4.3   |  32<H>  |  0.80    Ca    8.7      13 Aug 2021 06:14  Phos  4.0     08-13  Mg     1.90     08-13        Urinalysis Basic - ( 12 Aug 2021 11:29 )    Color: Yellow / Appearance: Slightly Turbid / S.024 / pH: x  Gluc: x / Ketone: Negative  / Bili: Negative / Urobili: <2 mg/dL   Blood: x / Protein: 30 mg/dL / Nitrite: Negative   Leuk Esterase: Large / RBC: 2 /HPF / WBC 15 /HPF   Sq Epi: x / Non Sq Epi: x / Bacteria: Moderate      Final Diagnosis  PERICARDIAL FLUID  NEGATIVE FOR MALIGNANT CELLS.      RADIOLOGY & ADDITIONAL STUDIES:      Patient name: VAUGHN HOSKINS  YOB: 1924   Age: 89 (F)   MR#: [id number]  Study Date: 2021  Location: [id number]: MARLENA Enrique  Study quality: Technically good  Referring Physician: Ria Perry MD  Blood Pressure: 128/56 mmHg  Height: 165 cm  Weight: 77 kg  BSA: 1.9 m2  ------------------------------------------------------------------------  PROCEDURE: Limited transthoracic echocardiogram with 2-D.  M-Mode and spectral and color flow Doppler.  INDICATION: Cardiac tamponade (I31.4)  ------------------------------------------------------------------------  CONCLUSIONS:  Limited repeat study to re-evaluate pericardial effusion.  1. Small pericardial effusion posterior to the left  ventricle, adjacent to the RV free wall, and superior to  the right atrium.  2. Left pleural effusion.  *** Compared with echocardiogram of 2021, no  significant changes noted.  ------------------------------------------------------------------------  Confirmed on  2021 - 08:26:01 by Dax Adamson M.D.,  [facility], [facility]                ASSESSMENT:   89yFemalePAST MEDICAL & SURGICAL HISTORY:  HTN (Hypertension)  Squamous Cell Carcinoma of Skin of Face  removed from R cheek 4 years ago  Abnormality of femur    HEALTH ISSUES - PROBLEM Dx:  Pericardial effusion  Final Diagnosis  PERICARDIAL FLUID  NEGATIVE FOR MALIGNANT CELLS.      PLAN:  case reviewed with Dr Miner  please repeat CT chest without contrast (ordered)   continue care per primary team  will follow    Ashley COLLIER [id number]

## 2021-08-13 NOTE — PROGRESS NOTE ADULT - SUBJECTIVE AND OBJECTIVE BOX
Ria Perry MD  [facility] Division of Hospital Medicine  Pager [phone] (M-F 8AM-5PM)  Other Times: [phone]    Patient is a 89y old  Female who presents with a chief complaint of Frequent Falls, concern for Cellulitis. (13 Aug 2021 10:57)    SUBJECTIVE / OVERNIGHT EVENTS: Patient seen and examined at bedside. Denies chest pain/sob.     MEDICATIONS  (STANDING):  amLODIPine   Tablet 10 milliGRAM(s) Oral daily  aspirin enteric coated 81 milliGRAM(s) Oral daily  benzocaine 15 mG/menthol 3.6 mG (Sugar-Free) Lozenge 1 Lozenge Oral three times a day  cefTRIAXone   IVPB 1000 milliGRAM(s) IV Intermittent every 24 hours  furosemide    Tablet 20 milliGRAM(s) Oral daily  lidocaine   Patch 1 Patch Transdermal daily  multivitamin 1 Tablet(s) Oral daily  polyethylene glycol 3350 17 Gram(s) Oral daily  senna 2 Tablet(s) Oral at bedtime    MEDICATIONS  (PRN):  acetaminophen   Tablet .. 650 milliGRAM(s) Oral every 6 hours PRN Mild Pain (1 - 3), Moderate Pain (4 - 6), Severe Pain (7 - 10)  albuterol/ipratropium for Nebulization 3 milliLiter(s) Nebulizer every 6 hours PRN Shortness of Breath and/or Wheezing  aluminum hydroxide/magnesium hydroxide/simethicone Suspension 30 milliLiter(s) Oral every 4 hours PRN Dyspepsia  melatonin 3 milliGRAM(s) Oral at bedtime PRN Insomnia      PHYSICAL EXAM:  Vital Signs Last 24 Hrs  T(C): 36.3 (13 Aug 2021 06:04), Max: 36.6 (12 Aug 2021 14:39)  T(F): 97.3 (13 Aug 2021 06:04), Max: 97.8 (12 Aug 2021 14:39)  HR: 66 (13 Aug 2021 06:04) (66 - 68)  BP: 125/55 (13 Aug 2021 06:04) (117/50 - 125/55)  RR: 20 (13 Aug 2021 06:04) (20 - 20)  SpO2: 96% (13 Aug 2021 06:04) (96% - 97%)    CONSTITUTIONAL: NAD, well-developed, well-groomed  RESPIRATORY: Normal respiratory effort; lungs are clear to auscultation bilaterally  CARDIOVASCULAR: Regular rate and rhythm, normal S1 and S2, no murmur/rub/gallop; No lower extremity edema + pericardial drain in place with serosanguineous fluid  GASTROINTESTINAL: Nontender to palpation, normoactive bowel sounds, no rebound/guarding; No hepatosplenomegaly  MUSCULOSKELETAL:  no clubbing or cyanosis of digits; no joint swelling or tenderness to palpation  NEUROLOGY: non-focal; no gross sensory deficits   PSYCH: A+O to person, place, and time; affect appropriate  SKIN: No rashes; warm     LABS:                        10.1   4.49  )-----------( 150      ( 13 Aug 2021 06:14 )             31.5     08-13    132<L>  |  93<L>  |  22  ----------------------------<  99  4.3   |  32<H>  |  0.80    Ca    8.7      13 Aug 2021 06:14  Phos  4.0     08-13  Mg     1.90     08-13            Urinalysis Basic - ( 12 Aug 2021 11:29 )    Color: Yellow / Appearance: Slightly Turbid / S.024 / pH: x  Gluc: x / Ketone: Negative  / Bili: Negative / Urobili: <2 mg/dL   Blood: x / Protein: 30 mg/dL / Nitrite: Negative   Leuk Esterase: Large / RBC: 2 /HPF / WBC 15 /HPF   Sq Epi: x / Non Sq Epi: x / Bacteria: Moderate          RADIOLOGY & ADDITIONAL TESTS:  Results Reviewed:   Imaging Personally Reviewed:  Electrocardiogram Personally Reviewed:    COORDINATION OF CARE:  Care Discussed with Consultants/Other Providers [Y/N]:  Prior or Outpatient Records Reviewed [Y/N]:

## 2021-08-13 NOTE — PROGRESS NOTE ADULT - NSPROGADDITIONALINFOA_GEN_ALL_CORE
Plan discussed with daughter Lyric Good 8/9 and KARLO Uribe.
Plan discussed with patient, patient's daughter Lyric Good 8/12, ACP Vijaya
Plan discussed with ACP Jojo, unable to reach daughter Lyric
Plan discussed with ACP Michael, patient's daughter Lyric Rankinal
Plan discussed with KARLO Lilly.

## 2021-08-14 NOTE — PROGRESS NOTE ADULT - PROBLEM SELECTOR PLAN 7
Stable  - at home on ?losartan - has been held since admission, started on amlodipine 10mg daily   - BPs low-normal --> will decrease amlodipine to 5mg daily   - monitor vital signs

## 2021-08-14 NOTE — PROGRESS NOTE ADULT - SUBJECTIVE AND OBJECTIVE BOX
[facility] Division of Hospital Medicine  Dorita Martinez MD  Pager: [phone]      Patient is a 89y old  Female who presents with a chief complaint of Frequent Falls, concern for Cellulitis. (14 Aug 2021 07:04)      SUBJECTIVE / OVERNIGHT EVENTS: Patient reported had desat to high 80s. Patient seen and examined, she has no complaints, denies chest pain or SOB. States she ate some breakfast and is now trying to rest.     MEDICATIONS  (STANDING):  amLODIPine   Tablet 5 milliGRAM(s) Oral daily  aspirin enteric coated 81 milliGRAM(s) Oral daily  benzocaine 15 mG/menthol 3.6 mG (Sugar-Free) Lozenge 1 Lozenge Oral three times a day  cefTRIAXone   IVPB 1000 milliGRAM(s) IV Intermittent every 24 hours  furosemide    Tablet 20 milliGRAM(s) Oral daily  lidocaine   Patch 1 Patch Transdermal daily  multivitamin 1 Tablet(s) Oral daily  polyethylene glycol 3350 17 Gram(s) Oral daily  senna 2 Tablet(s) Oral at bedtime    MEDICATIONS  (PRN):  acetaminophen   Tablet .. 650 milliGRAM(s) Oral every 6 hours PRN Mild Pain (1 - 3), Moderate Pain (4 - 6), Severe Pain (7 - 10)  albuterol/ipratropium for Nebulization 3 milliLiter(s) Nebulizer every 6 hours PRN Shortness of Breath and/or Wheezing  aluminum hydroxide/magnesium hydroxide/simethicone Suspension 30 milliLiter(s) Oral every 4 hours PRN Dyspepsia  melatonin 3 milliGRAM(s) Oral at bedtime PRN Insomnia      CAPILLARY BLOOD GLUCOSE        I&O's Summary    13 Aug 2021 07:01  -  14 Aug 2021 07:00  --------------------------------------------------------  IN: 0 mL / OUT: 20 mL / NET: -20 mL        PHYSICAL EXAM:  Vital Signs Last 24 Hrs  T(C): 37 (14 Aug 2021 06:25), Max: 37 (14 Aug 2021 06:25)  T(F): 98.6 (14 Aug 2021 06:25), Max: 98.6 (14 Aug 2021 06:25)  HR: 70 (14 Aug 2021 06:25) (64 - 71)  BP: 126/52 (14 Aug 2021 06:25) (119/49 - 126/52)  BP(mean): --  RR: 16 (14 Aug 2021 06:25) (16 - 18)  SpO2: 95% (14 Aug 2021 06:25) (95% - 98%)    CONSTITUTIONAL: elderly female in NAD, well-developed, well-groomed  RESPIRATORY: Normal respiratory effort; lungs are clear to auscultation bilaterally  CARDIOVASCULAR: Regular rate and rhythm, normal S1 and S2, no murmur/rub/gallop; No lower extremity edema + pericardial drain in place with serosanguineous fluid  GASTROINTESTINAL: Nontender to palpation, normoactive bowel sounds, no rebound/guarding  MUSCULOSKELETAL:  no clubbing or cyanosis of digits; no joint swelling or tenderness to palpation  NEUROLOGY: non-focal; no gross sensory deficits   PSYCH: A+O to person, place, and time; affect appropriate  SKIN: No rashes; warm     LABS:                        10.7   4.61  )-----------( 166      ( 14 Aug 2021 06:40 )             33.5     08-14    131<L>  |  91<L>  |  19  ----------------------------<  119<H>  4.2   |  32<H>  |  0.75    Ca    8.9      14 Aug 2021 06:40  Phos  3.4     08-14  Mg     2.00     08-14                Culture - Urine (collected 12 Aug 2021 16:45)  Source: Clean Catch Clean Catch (Midstream)  Final Report (14 Aug 2021 08:02):    >=3 organisms. Probable collection contamination.        RADIOLOGY & ADDITIONAL TESTS:  Results Reviewed:   Imaging Personally Reviewed:  Electrocardiogram Personally Reviewed:    COORDINATION OF CARE:  Care Discussed with Consultants/Other Providers [Y/N]:  Prior or Outpatient Records Reviewed [Y/N]:

## 2021-08-14 NOTE — PROVIDER CONTACT NOTE (OTHER) - REASON
Pt desaturated to 66% while on 3L NC
Pt desaturated to 66% on 3L
tachypnea
Patient chest tube site having redness and scant purulent drainage
Patient noted disoriented to place.
9 beats of V-tach noted on tele monitor.
Pt desaturated to 86% on room air

## 2021-08-14 NOTE — PROVIDER CONTACT NOTE (OTHER) - SITUATION
Patient chest tube site having redness and scant purulent drainage
9 beats of V-tach noted on tele monitor.
Patient noted disoriented to place. Patient noted restless and state that she is in her sisters home.
Pt desaturated to 66% while on 3L NC
RR 36 , /53 HR 66 o2 SAT 98.
NANDO Kevin notified Pt desaturated to 66% while on 3L NC.
Pt desaturated to 86% on room air, while at rest in bed. Pt recovered to 95% on 3L NC.

## 2021-08-14 NOTE — PROGRESS NOTE ADULT - PROBLEM SELECTOR PLAN 8
DVT ppx: SCDs holding pharmacological ppx i/s/o pericardial effusion  GOC: DNR/DNI, MOLST form completed, decision maker: patient  Dispo: Home with HHA and Oxygen, pending IR removal of pericardial drain    Discussed with ACP Dorita DVT ppx: SCDs holding pharmacological ppx i/s/o pericardial effusion  GOC: DNR/DNI, MOLST form completed, decision maker: patient  Dispo: Home with HHA and Oxygen, pending IR removal of pericardial drain    Discussed with ACP Dorita  Spoke with patient's daughter: Lyric Good [phone] and updated

## 2021-08-14 NOTE — PROVIDER CONTACT NOTE (OTHER) - ASSESSMENT
Patient noted disoriented to place. Vitals stable, /50, RR 20, HR 69, O2 93% via 2L NC.
VS stable, chest tube insertion site is red with purulent drainage. Denies any pain or discomfort. Dressing changed and site cleaned
no complains of distress, or pain or difficulty breathing. Resting comfortably.
Pt is AOx4 without signs or symptoms of distress
No acute distress noted. Denies chest pain, sob, headache, n/v/d/c.
Pt asymptomatic. Pt recovered to 99% on 3L without intervention. Will continue to monitor.
Pt is AOx4, asymptomatic for episode and recovered to 99% on 3L NC RR 16, without intervention.

## 2021-08-14 NOTE — PROVIDER CONTACT NOTE (OTHER) - RECOMMENDATIONS
PA Notified
Patient grandgurvinder Dotson called and allow patient to talk with him. Provider made aware.
Continue to monitor Pt and maintain SpO2 above 90%.
ACP Vickie Cornelius notified
Continue to monitor Pt
Continue to monitor Pt on 3L NC
As per PA, continue to monitor and notify for change in status

## 2021-08-14 NOTE — PROVIDER CONTACT NOTE (OTHER) - ACTION/TREATMENT ORDERED:
ACP Vickie Cornelius aware. magnesium Sulfate to be ordered. Will continue to monitor.
Continue to monitor Pt on nasal cannula
Continue to monitor Pt on 3L NC
As per PA, continue to monitor and notify for change in status
Awaiting orders.
Continue to monitor.
Provider made aware. Melatonin given for sleep. Safety maintained. Monitoring ongoing.

## 2021-08-14 NOTE — PROGRESS NOTE ADULT - PROBLEM SELECTOR PLAN 1
Saturating well on 2LNC, BPs stable  - CT Chest with large pericardial effusion, TTE with e/o of tamponade   - s/p pericardiocentesis (700cc drained), now with drain, repeat echo w/ resolution of pericardial effusion  - monitor drain output - currently 50cc, IR recommending removal once draining <10cc/24hours  - 8/12: drain output noted to be more sanguineous than baseline, Hb stable, vital stable, repeat TTE unchanged, IR evaluated   - Appreciated thoracic surgery eval --> now pending CT chest   - c/w telemetry monitoring

## 2021-08-14 NOTE — PROVIDER CONTACT NOTE (OTHER) - DATE AND TIME:
02-Aug-2021 13:24
14-Aug-2021 23:49
05-Aug-2021 09:30
01-Aug-2021 20:00
02-Aug-2021 13:24
08-Aug-2021 12:16

## 2021-08-14 NOTE — PROGRESS NOTE ADULT - ASSESSMENT
89F DNR h/o multiple recent falls, found to have large pericardial effusion and underwent IR-guided pericardial drain [fluid negative for malignancy]      - Patient with residual low-volume serosanguinous output from pericardial drain. Requiring 3L NC, however non-labored; states breathing is baseline, slightly better than previous week. Afebrile, non-leukocytotic, hemodynamically stable with SBPs in the 110-120s. No emergent thoracic surgical intervention  - CT chest pending  - will continue to follow  - above plan d/w Dr. Kristofer Jackson [facility] PGY6  Thoracic Surgery  [id number]

## 2021-08-14 NOTE — PROVIDER CONTACT NOTE (OTHER) - BACKGROUND
Pt has chest tube in place and is on 3L NC
Pt has chest tube to water seal.
Pt has pericardial effusion with chest tube to waterseal
Patient admitted with pericardial effusion, found to have tamponade. S/p pericardialcentesis and Chest tube placed on left lateral side.
admitted with Pericardial effusion. With chest tube
Admitted for percardial effusion and LE cellulitis
admitted with Pericardial effusion. With chest tube

## 2021-08-14 NOTE — CHART NOTE - NSCHARTNOTEFT_GEN_A_CORE
pt seen and examined with Dr Miner  please make sure pericardial drainage is documented correctly  Drainage has been recorded as less than 20cc/24h  Recommend 1* team to contact IR to removed the pericardial drain  no thoracic intervention at this time    Ashley COLLIER [id number]

## 2021-08-14 NOTE — PROGRESS NOTE ADULT - SUBJECTIVE AND OBJECTIVE BOX
Thoracic Surgery  CC: pericardial fluid  HPI: 89F DNR h/o multiple recent falls, found to have large pericardial effusion and underwent IR-guided pericardial drain [fluid negative for malignancy]  24/Overnight events: Patient seen and examined at bedside. Requiring 3L NC, however non-labored; states breathing is baseline, slightly better than previous week. Drainage with serosanguinous output. CT chest pending. Afebrile, non-leukocytotic, hemodynamically stable with SBPs in the 110-120s.           Objective:  Vital Signs Last 24 Hrs  T(C): 37 (14 Aug 2021 06:25), Max: 37 (14 Aug 2021 06:25)  T(F): 98.6 (14 Aug 2021 06:25), Max: 98.6 (14 Aug 2021 06:25)  HR: 70 (14 Aug 2021 06:25) (64 - 70)  BP: 126/52 (14 Aug 2021 06:25) (119/49 - 126/52)  BP(mean): --  RR: 16 (14 Aug 2021 06:25) (16 - 18)  SpO2: 95% (14 Aug 2021 06:25) (95% - 98%)    Physical Exam:  General: comfortable laying in bed  Respiratory: non-labored on 3L NC  Cardiovascular: regular rhythm, rate of 70      MEDICATIONS  (STANDING):  amLODIPine   Tablet 10 milliGRAM(s) Oral daily  aspirin enteric coated 81 milliGRAM(s) Oral daily  benzocaine 15 mG/menthol 3.6 mG (Sugar-Free) Lozenge 1 Lozenge Oral three times a day  cefTRIAXone   IVPB 1000 milliGRAM(s) IV Intermittent every 24 hours  furosemide    Tablet 20 milliGRAM(s) Oral daily  lidocaine   Patch 1 Patch Transdermal daily  multivitamin 1 Tablet(s) Oral daily  polyethylene glycol 3350 17 Gram(s) Oral daily  senna 2 Tablet(s) Oral at bedtime    MEDICATIONS  (PRN):  acetaminophen   Tablet .. 650 milliGRAM(s) Oral every 6 hours PRN Mild Pain (1 - 3), Moderate Pain (4 - 6), Severe Pain (7 - 10)  albuterol/ipratropium for Nebulization 3 milliLiter(s) Nebulizer every 6 hours PRN Shortness of Breath and/or Wheezing  aluminum hydroxide/magnesium hydroxide/simethicone Suspension 30 milliLiter(s) Oral every 4 hours PRN Dyspepsia  melatonin 3 milliGRAM(s) Oral at bedtime PRN Insomnia    I&O's Detail    13 Aug 2021 07:01  -  14 Aug 2021 07:00  --------------------------------------------------------  IN:  Total IN: 0 mL    OUT:    Chest Tube (mL): 20 mL  Total OUT: 20 mL    Total NET: -20 mL       LABS:                        10.7   4.61  )-----------( 166      ( 14 Aug 2021 06:40 )             33.5     08-13    132<L>  |  93<L>  |  22  ----------------------------<  99  4.3   |  32<H>  |  0.80    Ca    8.7      13 Aug 2021 06:14  Phos  4.0     08-  Mg     1.90     08-13        Urinalysis Basic - ( 12 Aug 2021 11:29 )    Color: Yellow / Appearance: Slightly Turbid / S.024 / pH: x  Gluc: x / Ketone: Negative  / Bili: Negative / Urobili: <2 mg/dL   Blood: x / Protein: 30 mg/dL / Nitrite: Negative   Leuk Esterase: Large / RBC: 2 /HPF / WBC 15 /HPF   Sq Epi: x / Non Sq Epi: x / Bacteria: Moderate

## 2021-08-15 NOTE — PROGRESS NOTE ADULT - SUBJECTIVE AND OBJECTIVE BOX
Thoracic Surgery  CC: pericardial fluid  HPI: 89F DNR h/o multiple recent falls, found to have large pericardial effusion and underwent IR-guided pericardial drain [fluid negative for malignancy]  24/Overnight events: Patient seen and examined at bedside. Requiring 2-3L NC, however non-labored; breathing remains at baseline, slightly better than previous week. Drainage with serosanguinous output. Remains afebrile, non-leukocytotic, hemodynamically stable with SBPs in the 110-120s.       Objective:  Vital Signs Last 24 Hrs  T(C): 36.4 (15 Aug 2021 05:20), Max: 36.4 (15 Aug 2021 05:20)  T(F): 97.5 (15 Aug 2021 05:20), Max: 97.5 (15 Aug 2021 05:20)  HR: 66 (15 Aug 2021 05:20) (66 - 71)  BP: 127/53 (15 Aug 2021 05:20) (117/46 - 128/50)  BP(mean): --  RR: 17 (15 Aug 2021 05:20) (17 - 17)  SpO2: 96% (15 Aug 2021 05:20) (93% - 96%)    Physical Exam:  General: comfortable laying in bed  Respiratory: non-labored on 3L NC  Cardiovascular: regular rhythm, rate of 68      MEDICATIONS  (STANDING):  amLODIPine   Tablet 5 milliGRAM(s) Oral daily  aspirin enteric coated 81 milliGRAM(s) Oral daily  benzocaine 15 mG/menthol 3.6 mG (Sugar-Free) Lozenge 1 Lozenge Oral three times a day  furosemide    Tablet 20 milliGRAM(s) Oral daily  lidocaine   Patch 1 Patch Transdermal daily  multivitamin 1 Tablet(s) Oral daily  polyethylene glycol 3350 17 Gram(s) Oral daily  senna 2 Tablet(s) Oral at bedtime    MEDICATIONS  (PRN):  acetaminophen   Tablet .. 650 milliGRAM(s) Oral every 6 hours PRN Mild Pain (1 - 3), Moderate Pain (4 - 6), Severe Pain (7 - 10)  albuterol/ipratropium for Nebulization 3 milliLiter(s) Nebulizer every 6 hours PRN Shortness of Breath and/or Wheezing  aluminum hydroxide/magnesium hydroxide/simethicone Suspension 30 milliLiter(s) Oral every 4 hours PRN Dyspepsia  melatonin 3 milliGRAM(s) Oral at bedtime PRN Insomnia    I&O's Detail    14 Aug 2021 07:01  -  15 Aug 2021 07:00  --------------------------------------------------------  IN:    Oral Fluid: 120 mL  Total IN: 120 mL    OUT:    Chest Tube (mL): 40 mL    Voided (mL): 800 mL  Total OUT: 840 mL    Total NET: -720 mL       LABS:                        10.7   4.25  )-----------( 149      ( 15 Aug 2021 06:57 )             33.7     08-14    131<L>  |  91<L>  |  19  ----------------------------<  119<H>  4.2   |  32<H>  |  0.75    Ca    8.9      14 Aug 2021 06:40  Phos  3.4     08-14  Mg     2.00     08-14

## 2021-08-15 NOTE — PROGRESS NOTE ADULT - PROBLEM SELECTOR PLAN 1
Saturating well on 2LNC, BPs stable  - CT Chest with large pericardial effusion, TTE with e/o of tamponade   - s/p pericardiocentesis (700cc drained), now with drain, repeat echo w/ resolution of pericardial effusion  - monitor drain output - currently draining < 20cc confirmed with RN   - 8/12: drain output noted to be more sanguineous than baseline, Hb stable, vital stable, repeat TTE unchanged, IR evaluated   - Appreciated thoracic surgery eval --> recommend IR to removal drain, will inform them  - CT chest reviewed, showing trace pericardial effusion   - c/w telemetry monitoring

## 2021-08-15 NOTE — PROGRESS NOTE ADULT - SUBJECTIVE AND OBJECTIVE BOX
[facility] Division of Hospital Medicine  Dorita Martinez MD  Pager: [phone]      Patient is a 89y old  Female who presents with a chief complaint of Frequent Falls, concern for Cellulitis. (15 Aug 2021 07:33)      SUBJECTIVE / OVERNIGHT EVENTS: patient seen and examined, no noted events overnight. Patient states that she feels well. Denies CP or SOB. Per RN, has not had any drainage from pericardial drain.     MEDICATIONS  (STANDING):  amLODIPine   Tablet 5 milliGRAM(s) Oral daily  aspirin enteric coated 81 milliGRAM(s) Oral daily  benzocaine 15 mG/menthol 3.6 mG (Sugar-Free) Lozenge 1 Lozenge Oral three times a day  furosemide    Tablet 20 milliGRAM(s) Oral daily  lidocaine   Patch 1 Patch Transdermal daily  multivitamin 1 Tablet(s) Oral daily  polyethylene glycol 3350 17 Gram(s) Oral daily  senna 2 Tablet(s) Oral at bedtime    MEDICATIONS  (PRN):  acetaminophen   Tablet .. 650 milliGRAM(s) Oral every 6 hours PRN Mild Pain (1 - 3), Moderate Pain (4 - 6), Severe Pain (7 - 10)  albuterol/ipratropium for Nebulization 3 milliLiter(s) Nebulizer every 6 hours PRN Shortness of Breath and/or Wheezing  aluminum hydroxide/magnesium hydroxide/simethicone Suspension 30 milliLiter(s) Oral every 4 hours PRN Dyspepsia  melatonin 3 milliGRAM(s) Oral at bedtime PRN Insomnia      CAPILLARY BLOOD GLUCOSE        I&O's Summary    14 Aug 2021 07:01  -  15 Aug 2021 07:00  --------------------------------------------------------  IN: 120 mL / OUT: 840 mL / NET: -720 mL        PHYSICAL EXAM:  Vital Signs Last 24 Hrs  T(C): 36.4 (15 Aug 2021 05:20), Max: 36.4 (15 Aug 2021 05:20)  T(F): 97.5 (15 Aug 2021 05:20), Max: 97.5 (15 Aug 2021 05:20)  HR: 66 (15 Aug 2021 05:20) (66 - 71)  BP: 127/53 (15 Aug 2021 05:20) (117/46 - 128/50)  BP(mean): --  RR: 17 (15 Aug 2021 05:20) (17 - 17)  SpO2: 96% (15 Aug 2021 05:20) (93% - 96%)    CONSTITUTIONAL: elderly female in NAD, well-developed, well-groomed  RESPIRATORY: Normal respiratory effort; lungs are clear to auscultation bilaterally  CARDIOVASCULAR: Regular rate and rhythm, normal S1 and S2, no murmur/rub/gallop; No lower extremity edema + pericardial drain in place with serosanguineous fluid  GASTROINTESTINAL: Nontender to palpation, normoactive bowel sounds, no rebound/guarding  MUSCULOSKELETAL:  no clubbing or cyanosis of digits; no joint swelling or tenderness to palpation  NEUROLOGY: non-focal; no gross sensory deficits   PSYCH: A+O to person, place, and time; affect appropriate  SKIN: No rashes; warm     LABS:                        10.7   4.25  )-----------( 149      ( 15 Aug 2021 06:57 )             33.7     08-15    134<L>  |  93<L>  |  15  ----------------------------<  104<H>  4.4   |  32<H>  |  0.70    Ca    9.4      15 Aug 2021 06:57  Phos  3.5     08-15  Mg     1.90     08-15                Culture - Urine (collected 12 Aug 2021 16:45)  Source: Clean Catch Clean Catch (Midstream)  Final Report (14 Aug 2021 08:02):    >=3 organisms. Probable collection contamination.        RADIOLOGY & ADDITIONAL TESTS:  Results Reviewed:   Imaging Personally Reviewed:  Electrocardiogram Personally Reviewed:    COORDINATION OF CARE:  Care Discussed with Consultants/Other Providers [Y/N]:  Prior or Outpatient Records Reviewed [Y/N]:

## 2021-08-15 NOTE — PROGRESS NOTE ADULT - ASSESSMENT
89F DNR h/o multiple recent falls, found to have large pericardial effusion and underwent IR-guided pericardial drain [fluid negative for malignancy]      - Patient with residual low-volume serosanguinous output from pericardial drain. Requiring 3L NC, however non-labored; states breathing is baseline, slightly better than previous week. Afebrile, non-leukocytotic, hemodynamically stable with SBPs in the 110-120s. No emergent thoracic surgical intervention  - No need for CT chest  - Recommend primary team / IR to remove pericardial drain  - above plan d/w Dr. Kristofer Jackson [facility] PGY6  Thoracic Surgery  [id number]  89F DNR h/o multiple recent falls, found to have large pericardial effusion and underwent IR-guided pericardial drain [fluid negative for malignancy]      - Patient with residual low-volume serosanguinous output from pericardial drain. CT scan w/resolved pericardial effusion. No emergent thoracic surgical intervention  - Recommend primary team / IR to remove pericardial drain  - above plan d/w Dr. Kristofer Bhat PGY6  Thoracic Surgery  [id number]

## 2021-08-15 NOTE — PROGRESS NOTE ADULT - PROBLEM SELECTOR PLAN 8
DVT ppx: SCDs holding pharmacological ppx i/s/o pericardial effusion  GOC: DNR/DNI, MOLST form completed, decision maker: patient  Dispo: Home with HHA and Oxygen, pending IR removal of pericardial drain    Discussed with ACP Dorita  Called patient's daughter: Lyric Good [phone], no answer, left message for callback.

## 2021-08-16 NOTE — PROGRESS NOTE ADULT - PROBLEM SELECTOR PLAN 7
Stable  - at home on ?losartan - has been held since admission  - currently well controlled on amlodipine 5mg daily   - monitor vital signs

## 2021-08-16 NOTE — PROGRESS NOTE ADULT - SUBJECTIVE AND OBJECTIVE BOX
Ria Perry MD  [facility] Division of [facility] Medicine  Pager [phone] (M-F 8AM-5PM)  Other Times: [phone]    Patient is a 89y old  Female who presents with a chief complaint of Frequent Falls, concern for Cellulitis. (15 Aug 2021 12:46)    SUBJECTIVE / OVERNIGHT EVENTS: Patient seen and examined at bedside. Denies chest pain or shortness of breath.     MEDICATIONS  (STANDING):  amLODIPine   Tablet 5 milliGRAM(s) Oral daily  aspirin enteric coated 81 milliGRAM(s) Oral daily  benzocaine 15 mG/menthol 3.6 mG (Sugar-Free) Lozenge 1 Lozenge Oral three times a day  furosemide    Tablet 20 milliGRAM(s) Oral daily  lidocaine   Patch 1 Patch Transdermal daily  multivitamin 1 Tablet(s) Oral daily  polyethylene glycol 3350 17 Gram(s) Oral daily  senna 2 Tablet(s) Oral at bedtime    MEDICATIONS  (PRN):  acetaminophen   Tablet .. 650 milliGRAM(s) Oral every 6 hours PRN Mild Pain (1 - 3), Moderate Pain (4 - 6), Severe Pain (7 - 10)  albuterol/ipratropium for Nebulization 3 milliLiter(s) Nebulizer every 6 hours PRN Shortness of Breath and/or Wheezing  aluminum hydroxide/magnesium hydroxide/simethicone Suspension 30 milliLiter(s) Oral every 4 hours PRN Dyspepsia  melatonin 3 milliGRAM(s) Oral at bedtime PRN Insomnia      PHYSICAL EXAM:  Vital Signs Last 24 Hrs  T(C): 36.9 (16 Aug 2021 05:00), Max: 36.9 (16 Aug 2021 05:00)  T(F): 98.4 (16 Aug 2021 05:00), Max: 98.4 (16 Aug 2021 05:00)  HR: 66 (16 Aug 2021 05:00) (63 - 73)  BP: 136/53 (16 Aug 2021 05:00) (126/55 - 136/53)  RR: 16 (16 Aug 2021 05:00) (16 - 17)  SpO2: 95% (16 Aug 2021 05:00) (93% - 95%)    CONSTITUTIONAL: NAD, well-developed, well-groomed  RESPIRATORY: Normal respiratory effort; lungs are clear to auscultation bilaterally  CARDIOVASCULAR: Regular rate and rhythm, normal S1 and S2, no murmur/rub/gallop; No lower extremity edema  GASTROINTESTINAL: Nontender to palpation, normoactive bowel sounds, no rebound/guarding; No hepatosplenomegaly  MUSCULOSKELETAL:  no clubbing or cyanosis of digits; no joint swelling or tenderness to palpation  NEUROLOGY: non-focal; no gross sensory deficits   PSYCH: A+O to person, place, and time; affect appropriate  SKIN: No rashes; warm     LABS:                        11.1   5.15  )-----------( 130      ( 16 Aug 2021 07:33 )             34.6     08-16    132<L>  |  92<L>  |  13  ----------------------------<  95  4.6   |  32<H>  |  0.69    Ca    9.4      16 Aug 2021 07:33  Phos  3.8     08-16  Mg     2.00     08-16                  RADIOLOGY & ADDITIONAL TESTS:  Results Reviewed:   Imaging Personally Reviewed:  Electrocardiogram Personally Reviewed:    COORDINATION OF CARE:  Care Discussed with Consultants/Other Providers [Y/N]:  Prior or Outpatient Records Reviewed [Y/N]:

## 2021-08-16 NOTE — PROGRESS NOTE ADULT - ASSESSMENT
89F, DNR/DNI, with hx of HTN who p/w repeated falls, found to have 5th/6th rib fractures, incidental pericardial effusion and cellulitis with course c/b acute respiratory failure 2/2 pericardial tamponade s/p IR drain (removed 8/16).

## 2021-08-16 NOTE — PROGRESS NOTE ADULT - PROBLEM SELECTOR PLAN 1
Saturating well on 2LNC, BPs stable  - CT Chest with large pericardial effusion, TTE with e/o of tamponade   - s/p pericardiocentesis (700cc drained) and IR drain, repeat echo w/ resolution of pericardial effusion  - appreciate thoracic surgery recommendations - no intervention, recommending IR to remove drain   - pericardial drain output <10cc/25 hours - removed by IR 8/16  - monitor on telemetry

## 2021-08-16 NOTE — PROGRESS NOTE ADULT - PROBLEM SELECTOR PLAN 8
DVT ppx: SCDs holding pharmacological ppx i/s/o pericardial effusion  GOC: DNR/DNI, MOLST form completed, decision maker: patient  Dispo: Home with HHA and Oxygen    Discussed with ACP Maritza and patient's daughter Lyric Good

## 2021-08-16 NOTE — PROGRESS NOTE ADULT - PROBLEM SELECTOR PLAN 2
Waxing and waning mental status (baseline AAOx3)  - hospital induced delirium given prolonged stay vs. UTI  - UA positive, UCx likely contaminated, s/p empiric CTX x3 days  - VBG with hypercapnia to 60s - however this is patient's baseline  - frequent reorientation

## 2021-08-17 NOTE — PROGRESS NOTE ADULT - PROBLEM SELECTOR PLAN 8
DVT ppx: SCDs holding pharmacological ppx i/s/o pericardial effusion  GOC: DNR/DNI, MOLST form completed, decision maker: patient  Dispo: Home with HHA and Oxygen. Patient stable for dc today with follow up with cardiology. DC planning - 40 minutes.     Discussed with ACP Jarad and patient's daughter Lyric Good

## 2021-08-17 NOTE — PROGRESS NOTE ADULT - PROVIDER SPECIALTY LIST ADULT
Hospitalist
Hospitalist
Cardiology
Cardiology
Thoracic Surgery
Hospitalist
Intervent Radiology
Intervent Radiology
Thoracic Surgery
Cardiology
Intervent Radiology
Hospitalist

## 2021-08-17 NOTE — PROGRESS NOTE ADULT - REASON FOR ADMISSION
Frequent Falls, concern for Cellulitis.

## 2021-08-17 NOTE — PROGRESS NOTE ADULT - PROBLEM SELECTOR PROBLEM 1
Cellulitis
Pericardial effusion
Cellulitis
Pericardial effusion
Cellulitis
Acute respiratory failure with hypoxia and hypercapnia
Cellulitis
Pericardial effusion
Pericardial effusion
Cellulitis
Cellulitis
Acute respiratory failure with hypoxia and hypercapnia
Pericardial effusion
Cellulitis
Cellulitis
Pericardial effusion
Pericardial effusion
Cellulitis
Pericardial effusion
Cellulitis
Pericardial effusion
Cellulitis
Pericardial effusion
Cellulitis
Pericardial effusion
Cellulitis

## 2021-08-20 NOTE — ED PROVIDER NOTE - CLINICAL SUMMARY MEDICAL DECISION MAKING FREE TEXT BOX
Impression:  89 yo M, hypoxic, unresponsive, s/p L chest 14G needle thoracostomy by EMS in the field on account of diminished L-sided breath sounds.  Likely aspiration in the field.  Technically would meet intubation criteria, but is DNR/DNI by MOLST form at bedside.   Plan:  honor MOLST form as detailed in progress notes.  Palliative care consult.  Anticipate hospitalist admission for comfort care.

## 2021-08-20 NOTE — H&P ADULT - PROBLEM SELECTOR PLAN 5
GOC discussion as above. As noted by ED team - family had wanted to revoke MOLST due to form not being side. MOLST reviewed. It was signed by attending and witnessed. Pt also verbally provided consent. [facility] consulted. Sukhi soares also consulted by ED.    -Requested by family to speak with relative who is a retired neurologist. Called Dr. Kunal Diallo [phone] with casey Gunter participating in conversation. We reviewed pt's course, latest findings. We discussed pt's GOC and my discussion with family. Dr. Diallo suggested that additional diagnostics should be pursued if within reason (i.e. CT imaging and ruling out other toxic metabolic issue) to evaluate why pt remains obtunded. I explained that pt is unstable at this point to be sent to CT given tenuous respiratory status. I also explained that her mental status could be multifactorial - hypercarbia (last VBG showed PCO2 of 93. It may also be explained by her hypoxia of unknown duration (note that pt had already been intermittent hypoxic when checked by family in the days after DC (80s) and was noted to be 20% when seen by EMS. It remains unclear how long pt could have been hypoxic for. Perhaps there is a component of anoxic brain injury? We can consider repeating blood gas to re-evaluate PCO2 ,however, if hypercarbia is worse - NIPPV is not appropriate due to mentation and intubation does not align w/ pt's GOC, all of which have been discussed above. GOC discussion as above. As noted by ED team - family had wanted to revoke MOLST due to form not being side. MOLST reviewed. It was signed by attending and witnessed. Pt also verbally provided consent. [facility] consulted. Sukhi [facility] also consulted by ED.    -Requested by family to speak with relative who is a retired neurologist. Called Dr. Kunal Diallo [phone] with dtmichael Gunter participating in conversation. We reviewed pt's course, latest findings. We discussed pt's GOC and my discussion with family. Dr. Diallo suggested that additional diagnostics should be pursued if within reason (i.e. CT imaging and ruling out other toxic metabolic issue) to evaluate why pt remains obtunded. I explained that pt is unstable at this point to be sent to CT given tenuous respiratory status. I also explained that her mental status could be multifactorial - hypercarbia (last VBG showed PCO2 of 93. It may also be explained by her hypoxia of unknown duration (note that pt had already been intermittent hypoxic when checked by family in the days after DC (80s) and was noted to be 20% when seen by EMS. It remains unclear how long pt could have been hypoxic for. Perhaps there is a component of anoxic brain injury? We can consider repeating blood gas to re-evaluate PCO2 ,however, if hypercarbia is worse - NIPPV is not appropriate due to mentation and intubation does not align w/ pt's GOC, all of which have been discussed above. I did reiterate that pt's prognosis is guarded. Status can deteriorate rapidly. DNI/DNR status is confirmed active.

## 2021-08-20 NOTE — H&P ADULT - PROBLEM SELECTOR PLAN 1
-Resp acidosis. PCO2 93. venous pH 7.22.   -Appreciate thoracic recs - no acute surgical interventions.  -Continue NRB for now. Wean as tolerated. No escalation of care as outlined by ISABELLE. Family now in agreement.  -Can order low dose morphine for comfort - defer for now as pt appears reasonably comfort and to avoid further respiratory suppression.  -Pall care/hospice evaluation. -Resp acidosis. PCO2 93. venous pH 7.22.   -Appreciate thoracic recs - no acute surgical interventions.  -Continue NRB for now. Wean as tolerated. -NIPVV not appropriate based on mentation. No intubation per MOLST. Family now in agreement.  -Can order low dose morphine for comfort - defer for now as pt appears reasonably comfort and to avoid further respiratory suppression.  -CXR findings reviewed. Report noted.  -Pall care/hospice evaluation.

## 2021-08-20 NOTE — ED PROVIDER NOTE - ATTENDING CONTRIBUTION TO CARE
MD Torres:  patient seen and evaluated with the resident.  I was present for key portions of the History & Physical, and I agree with the Impression & Plan.  MD Torres:  Notification:  89F respiratory failure, DNR/DNI, family asking to revoke code status and make full code.   Gen: elderly F, critically ill-appearing  Head: NC/AT  Neck: trachea midline  Resp:  +tachypnea, decreased breath sounds on the L.  L chest wall 14G angiocatheter nearly completely out of chest (< 1cm into skin).  Ext: no deformities  Neuro:  GCS 6  Skin:  Warm and dry as visualized  Psych:  unresponsive.   Impression:  89 yo M, hypoxic, unresponsive, s/p L chest 14G needle thoracostomy by EMS in the field on account of diminished L-sided breath sounds.  Likely aspiration in the field.  Technically would meet intubation criteria, but is DNR/DNI by MOLST form at bedside.   Plan:  honor MOLST form as detailed in progress notes.  Palliative care consult.  Anticipate hospitalist admission for comfort care. MD Torres:  patient seen and evaluated with the resident.  I was present for key portions of the History & Physical, and I agree with the Impression & Plan.  MD Torres:  Notification:  89F respiratory failure, DNR/DNI, family asking to revoke code status and make full code.   Gen: elderly F, critically ill-appearing  Head: NC/AT  Neck: trachea midline  Resp:  +tachypnea, decreased breath sounds on the L.  L chest wall 14G angiocatheter nearly completely out of chest (< 1cm into skin).  Ext: no deformities  Neuro:  GCS 6  Skin:  Warm and dry as visualized  Psych:  unresponsive.   Impression:  89 yo M, hypoxic, unresponsive, s/p L chest 14G needle thoracostomy by EMS in the field on account of diminished L-sided breath sounds.  Likely aspiration in the field.  Technically would meet intubation criteria, but is DNR/DNI by MOLST form at bedside.   Plan:  honor MOLST form as detailed in progress notes.  Palliative care consult.  Anticipate hospitalist admission for comfort care.  Upon my evaluation, this patient had a high probability of imminent or life-threatening deterioration due to respiratory failure,  which required my direct attention, intervention, and personal management.  The patient has a medical condition that impairs on or more vital organ systems.  Frequent personal assessment and adjustment of medical interventions was performed.   I have personally provided 30 minutes of critical care time exclusive of time spent on separately billable procedures.  Time includes review of laboratory data, radiology results, discussion with consultants, patient and family; monitoring for potential decompensation, as well as time spent retrieving data and reviewing the chart and documenting the visit.  Interventions that we performed are documented above.

## 2021-08-20 NOTE — H&P ADULT - PROBLEM SELECTOR PLAN 2
-Suspected PTX - s/p needle decompression in the field by EMS.  -Appreciate thoracic recs. Monitor for now. -Suspected PTX based on exam in the field - s/p needle decompression in the field by EMS.  -Appreciate thoracic recs. Monitor for now.  -CXR showed no signs of PTX.

## 2021-08-20 NOTE — H&P ADULT - ASSESSMENT
89F with PMH of SCC of skin, HTN, pericardial effusion s/p pericardiocentesis (Aug 2021), chronic hypoxic respiratory failure on home O2, recently d/c from [facility] 8/17 who was BIBEMS from home for AMS/hypoxia. She was markedly hypoxic in the field when triaged w/ EMS requiring needle decompression for suspected PTX. Sats improved post intervention.

## 2021-08-20 NOTE — ED PROVIDER NOTE - PHYSICAL EXAMINATION
GEN - AMS, Tachypneic  HENT - NC/AT, No visible Ecchymosis, No Abrasions, No Lac/Tears, MMM, no discharge  EYES - EOMI, PERRL, no conjunctival pallor, no scleral icterus  PULM - Diminished Breath Sounds L; Crackles R, Hypoxemic  CV -  Tachy, S1 S2, no murmurs 2+ Pulses B/L UE  GI - NT/ND, soft, no guarding, no rebound, no masses    MSK/EXT- no edema, no gross deformity, warm and well perfused, no calf tenderness/swelling/erythema   NEUROLOGIC - Depressed mental status, moving all 4 ext

## 2021-08-20 NOTE — H&P ADULT - NSHPPHYSICALEXAM_GEN_ALL_CORE
Vital Signs Last 24 Hrs  T(C): 36.3 (20 Aug 2021 11:18), Max: 36.3 (20 Aug 2021 11:18)  T(F): 97.3 (20 Aug 2021 11:18), Max: 97.3 (20 Aug 2021 11:18)  HR: 84 (20 Aug 2021 14:00) (84 - 94)  BP: 152/72 (20 Aug 2021 14:00) (125/67 - 152/73)  BP(mean): 93 (20 Aug 2021 14:00) (83 - 95)  RR: 24 (20 Aug 2021 14:00) (16 - 28)  SpO2: 99% (20 Aug 2021 14:00) (99% - 100%)    Gen- In bed, frail appearing. Comfortable.   Eyes- PERRLA. Nonicteric.  EMNT- Poor dentition. Dry MM. No tonsilar exudates. No posterior pharynx erythema.  Neck- Supple. No masses. No tracheal deviation.  Resp- Diminished breath sounds throughout. Poor/shallow effort. No r/r/w. No accessory muscle use.  CVS- RRR, S1S2, no g/r/m. No LE edema.  GI- Soft abd, NT, ND, +BSx4. No HSM.  MSK- No C/C. ROM intact. No crepitus.  Neuro- Unable to perform due to mental status. Obtunded.  Skin- No rashes/ulcers. Warm/dry.  Psych- AAOx0.

## 2021-08-20 NOTE — ED ADULT NURSE NOTE - NSIMPLEMENTINTERV_GEN_ALL_ED
Implemented All Fall with Harm Risk Interventions:  [place] to call system. Call bell, personal items and telephone within reach. Instruct patient to call for assistance. Room bathroom lighting operational. Non-slip footwear when patient is off stretcher. Physically safe environment: no spills, clutter or unnecessary equipment. Stretcher in lowest position, wheels locked, appropriate side rails in place. Provide visual cue, wrist band, yellow gown, etc. Monitor gait and stability. Monitor for mental status changes and reorient to person, place, and time. Review medications for side effects contributing to fall risk. Reinforce activity limits and safety measures with patient and family. Provide visual clues: red socks.

## 2021-08-20 NOTE — H&P ADULT - HISTORY OF PRESENT ILLNESS
Pt obtunded. Unable to provide information. Hx obtained from family who were present at bedside.     89F with PMH of SCC of skin, HTN, pericardial effusion s/p pericardiocentesis (Aug 2021), chronic hypoxic respiratory failure on home O2, recently d/c from [facility] 8/17 who was BIBEMS from home for AMS/hypoxia. Since DC from hospital, family report that pt has been intermittently hypoxic to the 80s based on their pulse ox check over the past two days. They noted that pt had been mentating well and making jokes with them. They deferred on bringing her to the hospital because of her prior hospitalization complications - delirium and overall bad experience However, over the past day, she became increasing lethargic and noted to have increased WOB. Family did not notice any focal weakness, difficulty swallowing, vomiting episodes. Hence, EMS was called. No reports of sick contacts, fevers, chills, NV, abd pain, diarrhea, dysuria. While in the field - patient noted to be hypoxic to the 20s w/ suspicion of PTX 2' decreased BS at the left. Needle decompression was done w/ improvement of sats.    ED Course: 142/75, 94, 28, 97.3F, 100% (%). Seen by thoracic surgery.

## 2021-08-20 NOTE — ED PROVIDER NOTE - CARE PLAN
1 Principal Discharge DX:	Acute respiratory failure with hypoxia  Secondary Diagnosis:	Aspiration pneumonitis

## 2021-08-20 NOTE — H&P ADULT - CONVERSATION DETAILS
Met with family at bedside. Spoke w/ Lyric (dtr), Mauri (grandson), Reena (grandson's wife). We discussed pt's recent hospitalization as well as events that transpired after DC. Pt is critically ill currently. Her O2/hemodynamics, although stable at this time, remains tenuous. We discussed that certain interventions would not be appropriate as pt's would not likely benefit from it (i.e. intubation). We discussed less invasive intervention like NIPPV as being inappropriate due to pt's aspiration risk, mental status. Based on review of records by myself and as noted by ED team - pt's wishes were addressed on her last hospitalization while she maintained capacity. Family was in agreement w/ keeping patient comfortable and avoiding invasive intervention.     They requested a Rabbi to come by. They agreed w/ hospice evaluation. They are holding on to the possibility that the CT scans ordered by the ED could be performed. At this point - the patient's respiratory status remains quite tenuous, so it may not be feasible at this point. No blood draws. Otherwise, all other questions were addressed. Opportunities for additional questions were provided. Await pall care/hospice input. Met with family at bedside. Spoke w/ Lyric (dtr), Mauri (grandson), Reena (grandson's wife). We discussed pt's recent hospitalization as well as events that transpired after DC. Pt is critically ill currently. Her O2/hemodynamics, although stable at this time, remains tenuous. We discussed that certain interventions would not be appropriate as pt's would not likely benefit from it (i.e. intubation). We discussed less invasive intervention like NIPPV as being inappropriate due to pt's aspiration risk, mental status. Based on review of records by myself and as noted by ED team - pt's wishes were addressed on her last hospitalization while she maintained capacity. Family was in agreement w/ keeping patient comfortable and avoiding invasive intervention.     They requested a Rabbi to come by. They agreed w/ hospice evaluation. They are holding on to the possibility that the CT scans ordered by the ED could be performed. At this point - the patient's respiratory status remains quite tenuous, so it may not be feasible at this point.  Otherwise, all other questions were addressed. Opportunities for additional questions were provided. Await pall care/hospice input.

## 2021-08-20 NOTE — CONSULT NOTE ADULT - SUBJECTIVE AND OBJECTIVE BOX
89 y/o female w/ PMHx HTN, HLD, who was recently admitted to [facility] on 7/16/21 after fall at home, required pericardial drain for pericardial effusion placed by IR on 7/19/21, and ultimately discharged home on 8/17/21.  As per patients family members at bedside in ER, she lives with her son, and has an aid.  This morning, she was found unresponsive in bed at ~10am by her grandson, and was brought by EMS to [facility] ER.  In ambulance, she required needle decompression of left chest due to absent breath sounds.  She is currently sitting-up in bed in the ER, with NRB mask on, O2sat 99%, /65 by cuff, SR in 80's, unresponsive.       Subjective: Unresponsive    Vital Signs:  Vital Signs Last 24 Hrs  T(C): 36.3 (08-20-21 @ 11:18), Max: 36.3 (08-20-21 @ 11:18)  T(F): 97.3 (08-20-21 @ 11:18), Max: 97.3 (08-20-21 @ 11:18)  HR: 88 (08-20-21 @ 12:00) (86 - 94)  BP: 128/65 (08-20-21 @ 12:00) (128/64 - 142/75)  RR: 22 (08-20-21 @ 12:00) (22 - 28)  SpO2: 99% (08-20-21 @ 12:00) (99% - 100%) on (O2)      General: Unresponsive; minimal accessory muscle use for breathing  ENT: grossly patent pharynx, no stridor  Neck: Neck supple, trachea midline, No JVD  Respiratory: CTA B/L  CV: S1S2; no audible murmurs  Abdominal: +BS's x4, soft, ND/NT  Extremities: No edema, + peripheral pulses  Skin: No Rashes, Hematoma, Ecchymosis                          11.4   6.96  )-----------( 168      ( 20 Aug 2021 11:15 )             36.9       139  |  93<L>  |  18  ----------------------------<  213<H>  4.4   |  36<H>  |  0.73    Ca    9.1       TPro  6.2  /  Alb  3.5  /  TBili  0.4  /  DBili  x   /  AST  99<H>  /  ALT  88<H>  /  AlkPhos  91    PT: 12.3 sec;   INR: 1.07 ratio      PTT:30.6 sec      Assessment  89 y/o female w/ PAST MEDICAL & SURGICAL HISTORY:  HTN (Hypertension)  Squamous Cell Carcinoma of Skin of Face removed from R cheek 4 years ago  Abnormality of femur    PLAN  CXR performed within the hr - No pneumothorax  TTE at bedside in ER with minimal pericardial effusion as per ER physician  Goals of Care Conversation note in [place] from 7/16/21 (last admission), stating that patient "would want to pass away naturally, and does not want to be connected to machines/tubes to keep her alive"   ER physician discussing comfort care with patients family members at bedside  No Thoracic Surgical/procedural intervention    Patient discussed with Dr. Leggett

## 2021-08-20 NOTE — PHARMACOTHERAPY INTERVENTION NOTE - COMMENTS
Medication history is complete and was verified with patient's medical record and [facility] ([phone]). Medication list updated in Outpatient Medication Record (OMR). Please call spectra [phone] if you have any questions.

## 2021-08-20 NOTE — ED PROVIDER NOTE - PROGRESS NOTE DETAILS
MD Torres:  original MOLST form at bedside.  Signed by patient on 7/16/21.  Clearly states DNR/DNI.    However, no treatment guideless specified otherwise.  Family is asking ED providers to intervene and make patient full code.  However, this is contrary to the original MOLST form signed 7/16/21 by the patient when she had full capacity to do so, and furthermore, per chart review, there is a progress note from 8/17/21, authored by Hospitalist, Dr. Perry, which references this same code status:  "GOC: DNR/DNI, MOLST form completed, decision maker: patient."  ED team will NOT intervene contrary to the MOLST form.    Palliative care consulted.   Thoracic surgery consulted MD Torres:  family is contending that the MOLST form was not signed by the patient.  In fact, this is correct.  None of the MOLST forms have her signature on it.  However, there is a Goals of Care note documented 7/16/21, the day the MOLST form was signed, authored by Dr. Iris Huertas, detailing the following:  "MOLST Discussed  Discussed MOLST with patient, Patient is currently capacitated to make informed medical decisions for herself. Patient states that "at my age" she would want to "pass away naturally" and does "not want to be connected to machines/tubes to keep her alive." Patient agrees to proceed to complete the MOLST depicting her wishes for DNR/DNI. Addressed all questions/concerns. Emotional support provided."    I have shown the family this chart note, which, in combination with her MOLST form, her clinical status, advanced age, make a very strong case for honoring the MOLST form as-is.  [facility] has been contacted to determine how to proceed, if the family were to adamantly contend that the MOLST form is invalid on account of no signature. MD Torres:  family is contending that the MOLST form was not signed by the patient.  In fact, this is correct.  None of the MOLST forms have her signature on it.  However, the box checked "verbal consent given" makes the form valid.   Furthermore, there is a Goals of Care note documented 7/16/21, the day the MOLST form was signed, authored by Dr. Iris Huertas, detailing the following:  "MOLST Discussed  Discussed MOLST with patient, Patient is currently capacitated to make informed medical decisions for herself. Patient states that "at my age" she would want to "pass away naturally" and does "not want to be connected to machines/tubes to keep her alive." Patient agrees to proceed to complete the MOLST depicting her wishes for DNR/DNI. Addressed all questions/concerns. Emotional support provided."    I have shown the family this chart note, which, in combination with her MOLST form, her clinical status, advanced age, make it clear what the patient's wishes were and the MOLST form is valid.  [facility] has been contacted to determine how to proceed, if the family were to adamantly contend that the MOLST form is invalid on account of no signature. Resident Dru: case endorsed to Hospitalist. Palliative consulted for further care management in consultation.

## 2021-08-20 NOTE — ED PROVIDER NOTE - OBJECTIVE STATEMENT
89 female, Hx: HTN, SCC, Pericardial Effusion (requiring pericardiocentesis). 89 female, Hx: HTN, SCC, Pericardial Effusion (requiring pericardiocentesis), and prior PTX - presents BIBEMS acutely hypoxemic to 20's in field, with decreased breath sound in L lung field, s/p in field needle decompression with improvement in her O2 Sat to 80's. Pt is accompanied by family who reports she was intermittently desaturating on her home oxygen to the 80's over the last 2 days, was told to come to the hospital at that time but wanted to stay home as she had a miserable stay the last time and became delirious. Family denies any recent fevers, chills, cough, CP, abdominal pain, nausea, vomiting, diarrhea, constipation, bloody stools, dysuric symptoms.     Upon chart review pt with endorsement of sound capacity and request to be DNR/DNI at that time.

## 2021-08-20 NOTE — ED ADULT NURSE NOTE - NS ED NURSE PATIENT LEFT UNIT TIME
18:45 Xerosis Normal Treatment: I recommended application of Cetaphil or CeraVe numerous times a day going to bed to all dry areas.

## 2021-08-20 NOTE — H&P ADULT - PROBLEM SELECTOR PLAN 3
-Likely 2' hypercarbia in setting of resp failure.  -Cont supp O2.   -NIPVV not appropriate based on mentation. No intubation per MOLST.  -CTH and C spine ordered - not done at this time. Family inquired on reason for study - CTH would help eval for possible stroke. Pt unstable to have it done at this point. -Likely 2' hypercarbia in setting of resp failure. Perhaps there is a component of anoxic brain injury from hypoxia of unknown duration.  -Cont supp O2.   -Can check UA, UTox, repeat VBG. May need straight cath if unable to obtain specimen.    -CTH and C spine ordered - not done at this time. Family inquired on reason for study - CTH would help eval for possible stroke. Pt's respiratory status too tenuous to proceed w/ study. Family (Mauri Gunter), and Dr. Diallo (relative who is a neurologist) would like to proceed when feasible.

## 2021-08-20 NOTE — H&P ADULT - PROBLEM SELECTOR PLAN 4
GOC discussion as above. As noted by ED team - family had wanted to revoke MOLST due to form not being side. MOLST reviewed. It was signed by attending and witnessed. Pt also verbally provided consent. Pall care consulted. [place] legal also consulted by ED.  Family requested I contact family member who is a neuro @ [employer]. Will further discuss. -Hx of pericardial effusion 2 years ago and in 8/2021.  -Workup unrevealing of etiology. No malignant/infectious etiologies noted.  -Hemodynamically stable. ED POCUS report reviewed. Small pericardial effusion, left pleural effusion. No hemodynamically significant issues.  -Formal TTE ordered

## 2021-08-20 NOTE — H&P ADULT - NSHPLABSRESULTS_GEN_ALL_CORE
11.4   6.96  )-----------( 168      ( 20 Aug 2021 11:15 )             36.9     08-20    139  |  93<L>  |  18  ----------------------------<  213<H>  4.4   |  36<H>  |  0.73    Ca    9.1      20 Aug 2021 11:15    TPro  6.2  /  Alb  3.5  /  TBili  0.4  /  DBili  x   /  AST  99<H>  /  ALT  88<H>  /  AlkPhos  91  08-20    PT/INR - ( 20 Aug 2021 11:15 )   PT: 12.3 sec;   INR: 1.07 ratio         PTT - ( 20 Aug 2021 11:15 )  PTT:30.6 sec          COVID-19 PCR: NotDetec (16 Jul 2021 11:21)      Chest XR reviewed personally - blunting of right costophrenic angle. Left lung atelectasis. No obvious PTX. Official read is pending.

## 2021-08-20 NOTE — CONSULT NOTE ADULT - ATTENDING COMMENTS
patient cxr demonstrates no significant findings, if ptx was present on left it's trace although difficult to see given rotation. patient is pending scans but at this point no urgent surgical intervention warranted from thoracic standpoint.

## 2021-08-20 NOTE — ED ADULT NURSE NOTE - OBJECTIVE STATEMENT
Pt arrives from home with BVM in progress and #18g R AC by EMS.  EMS states pt found unresponsive by family at home and called 911.  Had a recent pleural effusion with 700mL drained.  Non verbal, tachypneic, and pale.  O2 100% NRB as per MD maintained.  HOB elevated.  EKG done.  CM placed.  Labs obtained and sent as ordered.  MOLST form acknowledged and in chart.  Pt is DNR/DNI. Pt arrives from home with BVM in progress and #18g R AC by EMS.  EMS states pt found unresponsive by family at home and called 911.  Had a recent pleural effusion with 700mL drained.  Recent hospital admission with pleural effusion and fractured ribs S/P fall.  Non verbal, tachypneic, and pale.  O2 100% NRB as per MD maintained.  HOB elevated.  EKG done.  CM placed.  Labs obtained and sent as ordered.  MOLST form acknowledged and in chart.  Pt is DNR/DNI.

## 2021-08-20 NOTE — H&P ADULT - TREATMENT GUIDELINE COMMENT
MOLST from last hospitalization reviewed. Signed in appropriate field on admission to reflect reviewed status.

## 2021-08-20 NOTE — CHART NOTE - NSCHARTNOTEFT_GEN_A_CORE
************************************************************************  PALLIATIVE MEDICINE COORDINATION OF CARE NOTE FOR VAUGHN HOSKINS  [x] Inpatient Consult  [ ] Other:  ************************************************************************  SUMMARY OF RECOMMENDATIONS:    > Chart reviewed. Palliative provider spoke with ED provider, Dr. Torres, who explained that patient was DNR/DNI after recent hospitalization but patient had needle thoracotomy that was placed by EMT prior to ED presentation. Palliative asked to see for goc as patient's family seemed to be struggling with patient's code status decision. Patient was not symptomatic and provider did not need symptom recommendations.   > SYMPTOM MANAGEMENT: At the time of call, no symptoms. Patient appeared comfortable.   > GOALS OF CARE: Comfort care   > CODE STATUS: DNR/DNI   > HCP/ SURROGATE: Patient made decision during her most recent hospitalization on 7/16.   > Palliative team will f/u with official consult likely on Monday, 8/23.     Thank you for consulting us to participate in your patient's care. Please page [phone] for any q's or c's, symptom management recs.     Merlyn Baires D.O.   Palliative Medicine    ************************************************************************  CHART REVIEW: Appreciate hospitalist admission and goc discussion with family. Per H&P, patient appears comfortable.     HPI:  Pt obtunded. Unable to provide information. Hx obtained from family who were present at bedside.     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 Since DC from hospital, family report that pt has been intermittently hypoxic to the 80s based on their pulse ox check over the past two days. They noted that pt had been mentating well and making jokes with them. They deferred on bringing her to the hospital because of her prior hospitalization complications - delirium and overall bad experience However, over the past day, she became increasing lethargic and noted to have increased WOB. Family did not notice any focal weakness, difficulty swallowing, vomiting episodes. Hence, EMS was called. No reports of sick contacts, fevers, chills, NV, abd pain, diarrhea, dysuria. While in the field - patient noted to be hypoxic to the 20s w/ suspicion of PTX 2' decreased BS at the left. Needle decompression was done w/ improvement of sats.    ED Course: 142/75, 94, 28, 97.3F, 100% (%). Seen by thoracic surgery. (20 Aug 2021 15:06)      MEDICATIONS  (STANDING):    MEDICATIONS  (PRN):  ondansetron Injectable 4 milliGRAM(s) IV Push every 8 hours PRN Nausea and/or Vomiting      ************************************************************************  MEDICATION REVIEW:  --- Pls refer to current medicatons in the body of this note  --- PRN usage: n/a   ------------------------------------------------------------------------  COORDINATION OF CARE:  --- Palliative Care consulted for: goc, comfort care, possible hospice eval   --- Patient assessed: No   --- Patient previously seen by Palliative Care service: NO  ADVANCE CARE PLANNING  --- Code status: DNR/DNI   --- MOLST reviewed in chart: NONE  --- HCP/ Surrogate: Austyn (son) [phone]  --- GOC document found in Alpha: NONE  --- HCP/ Living will/ Other advanced directives in Alpha: NONE  CARE PROVIDER DOCUMENTATION:  --- SW/CM notes: n/a (reviewed from previous hospitalization)   --- PT recs: n/a   --- Sp/Sw recs: n/a   --- Nutrition recs: n/a, would recommend pleasure feeds if she is awake and goal is for comfort care  PLAN OF CARE  --- Known admissions in past year: 2  --- Current admit date: 8/20/21  --- LOS: 1  --- LACE score: 9  --- Current dispo plan: TO BE DETERMINED  ------------------------------------------------------------------------  --- Chart reviewed: 30 Minutes [including time used to gather, review and transfer data to this note]  --- Start: 4:30  --- End: 5pm   Prolonged services rendered, as part of this patient's care provided by Palliative Medicine, include: i.chart review for provider and ancillary service documentation, ii.pertinent diagnostics including laboratory and imaging studies,iii. medication review including PRN use, iv. admission history including previous palliative care encounters and GOC notes, v.advance care planning documents including HCP and MOLST forms in Alpha. Part of Palliative Medicine extended evaluation and management also involves coordination of care with our IDT, the primary and consulting pia, and unit CM/SW and Hospice if eligible. Recommendations based on the information gathered and discussed are outline in the AP of our notes.    ************************************************************************  Care coordination of VAUGHN HOSKINS was communicated with the interdisciplinary team during IDT rounds and with the primary team.
Met with Lyric (dtr), Mauri (grandson), and Reena (grandson's wife) at bedside at around 135PM. Discussed pt's current status. We discussed recent hospitalization, events post discharge. Concerns re: MOLST were addressed. Family entirely on board with pursuing comfort measures and hospice evaluation. They all agreed that invasive interventions would not be appropriate in this case. Family inquired on obtaining CTH and CT neck that was ordered. At this point - pt not stable enough to go for the test. Furthermore, it does not appear those studies would change any acute management. All questions/concerns addressed. Family had no further questions at this time. We will continue with comfort measures for now. Full note to follow.

## 2021-08-21 NOTE — DISCHARGE NOTE FOR THE EXPIRED PATIENT - HOSPITAL COURSE
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 She was markedly hypoxic in the field when triaged w/ EMS requiring needle decompression for suspected pneumothorax. Oxygen saturation improved post intervention. Patient was continued on a non-rebreather mask with an FiO2 of 100%. CXR performed was negative for pneumothorax and revealed bilateral pleural effusions, left greater than right, with associated lung atelectasis, unchanged from prior CT Chest performed 8 days prior. Patient was seen and evaluated by Thoracic surgery and determined that no urgent surgical intervention warranted. Limited TTE ordered to assess pericardial effusion which noted - No echocardiographic evidence of tamponade physiology is seen and  pericardial effusion size appears similar when compared to the prior limited study on 08/13/2021.    Of note, family meeting was held by Dr. Duval for a goal of care discussion and documented as follows:  -Requested by family to speak with relative who is a retired neurologist. Called Dr. Kunal Diallo [phone] with dtmichael Gunter participating in conversation. We reviewed pt's course, latest findings. We discussed pt's GOC and my discussion with family. Dr. Diallo suggested that additional diagnostics should be pursued if within reason (i.e. CT imaging and ruling out other toxic metabolic issue) to evaluate why pt remains obtunded. I explained that pt is unstable at this point to be sent to CT given tenuous respiratory status. I also explained that her mental status could be multifactorial - hypercarbia (last VBG showed PCO2 of 93. It may also be explained by her hypoxia of unknown duration (note that pt had already been intermittent hypoxic when checked by family in the days after DC (80s) and was noted to be 20% when seen by EMS. It remains unclear how long pt could have been hypoxic for. Perhaps there is a component of anoxic brain injury? We can consider repeating blood gas to re-evaluate PCO2 ,however, if hypercarbia is worse - NIPPV is not appropriate due to mentation and intubation does not align w/ pt's GOC, all of which have been discussed above. I did reiterate that pt's prognosis is guarded. Status can deteriorate rapidly. DNI/DNR status is confirmed active.  As per RN Kellen Mcdonald, patient was noted have no respiratory effort, on routine hourly rounding. On exam, pt's pupils noted to be fixed, non-reactive to light. No respiratory effort. No breath sounds or lung sounds on exam.  89F with PMH of SCC of skin, HTN, pericardial effusion s/p pericardiocentesis (Aug 2021), chronic hypoxic respiratory failure on home O2, recently d/c from [facility] 8/17 who was BIBEMS from home for AMS/hypoxia. She was markedly hypoxic in the field when triaged w/ EMS requiring needle decompression for suspected pneumothorax. Oxygen saturation improved post intervention. Patient was continued on a non-rebreather mask with an FiO2 of 100%. CXR performed was negative for pneumothorax and revealed bilateral pleural effusions, left greater than right, with associated lung atelectasis, unchanged from prior CT Chest performed 8 days prior. Patient was seen and evaluated by Thoracic surgery and determined that no urgent surgical intervention warranted. Limited TTE ordered to assess pericardial effusion which noted - No echocardiographic evidence of tamponade physiology is seen and  pericardial effusion size appears similar when compared to the prior limited study on 08/13/2021.    Of note, family meeting was held by Dr. Duval for a goal of care discussion and documented as follows:  -Requested by family to speak with relative who is a retired neurologist. Called Dr. Kunal Diallo [phone] with dtmichael Gunter participating in conversation. We reviewed pt's course, latest findings. We discussed pt's GOC and my discussion with family. Dr. Diallo suggested that additional diagnostics should be pursued if within reason (i.e. CT imaging and ruling out other toxic metabolic issue) to evaluate why pt remains obtunded. I explained that pt is unstable at this point to be sent to CT given tenuous respiratory status. I also explained that her mental status could be multifactorial - hypercarbia (last VBG showed PCO2 of 93. It may also be explained by her hypoxia of unknown duration (note that pt had already been intermittent hypoxic when checked by family in the days after DC (80s) and was noted to be 20% when seen by EMS. It remains unclear how long pt could have been hypoxic for. Perhaps there is a component of anoxic brain injury? We can consider repeating blood gas to re-evaluate PCO2 ,however, if hypercarbia is worse - NIPPV is not appropriate due to mentation and intubation does not align w/ pt's GOC, all of which have been discussed above. I did reiterate that pt's prognosis is guarded. Status can deteriorate rapidly. DNI/DNR status is confirmed active.  As per RN Kellen Mcdonald, patient was noted have no respiratory effort, on routine hourly rounding. On exam, pt's pupils noted to be fixed, non-reactive to light. No respiratory effort. No breath sounds or lung sounds on exam. Pulseless. Called pt's daughter, Lyric Good, [phone] made aware of passing. Called patient's son Austyn Good at [phone] x 2, however did not picked up. Called alternate number for Juan Antonio Good, [phone], message left on  to call [phone] and ask for writer. Notified Dr. SUSAN Tejada, covering hospitalist of pt's passing.  89F with PMH of SCC of skin, HTN, pericardial effusion s/p pericardiocentesis (Aug 2021), chronic hypoxic respiratory failure on home O2, recently d/c from [facility] 8/17 who was BIBEMS from home for AMS/hypoxia. She was markedly hypoxic in the field when triaged w/ EMS requiring needle decompression for suspected pneumothorax. Oxygen saturation improved post intervention. Patient was continued on a non-rebreather mask with an FiO2 of 100%. CXR performed was negative for pneumothorax and revealed bilateral pleural effusions, left greater than right, with associated lung atelectasis, unchanged from prior CT Chest performed 8 days prior. Patient was seen and evaluated by Thoracic surgery and determined that no urgent surgical intervention warranted. Limited TTE ordered to assess pericardial effusion which noted - No echocardiographic evidence of tamponade physiology is seen and  pericardial effusion size appears similar when compared to the prior limited study on 08/13/2021.    Of note, family meeting was held by Dr. Duval for a goal of care discussion and documented as follows:  -Requested by family to speak with relative who is a retired neurologist. Called Dr. Kunal Diallo [phone] with dtmichael Gunter participating in conversation. We reviewed pt's course, latest findings. We discussed pt's GOC and my discussion with family. Dr. Diallo suggested that additional diagnostics should be pursued if within reason (i.e. CT imaging and ruling out other toxic metabolic issue) to evaluate why pt remains obtunded. I explained that pt is unstable at this point to be sent to CT given tenuous respiratory status. I also explained that her mental status could be multifactorial - hypercarbia (last VBG showed PCO2 of 93. It may also be explained by her hypoxia of unknown duration (note that pt had already been intermittent hypoxic when checked by family in the days after DC (80s) and was noted to be 20% when seen by EMS. It remains unclear how long pt could have been hypoxic for. Perhaps there is a component of anoxic brain injury? We can consider repeating blood gas to re-evaluate PCO2 ,however, if hypercarbia is worse - NIPPV is not appropriate due to mentation and intubation does not align w/ pt's GOC, all of which have been discussed above. I did reiterate that pt's prognosis is guarded. Status can deteriorate rapidly. DNI/DNR status is confirmed active.  As per RN Kellen Mcdonald, patient was noted have no respiratory effort, on routine hourly rounding. On exam, pt's pupils noted to be fixed, non-reactive to light. No respiratory effort. No breath sounds or lung sounds on exam. Pulseless. Called pt's daughter, Lyric Good, [phone] made aware of passing. Called patient's son Austyn Good at [phone] x 2, however did not picked up. Called alternate number for Juan Antonio Good, [phone], message left on  to call [phone] and ask for writer. Notified Dr. SUSAN Tejada, [facility] hospitalist of pt's passing.       Attending addendum:  Events overnight reviewed. Called and spoke w/ Lyric. Offered my condolences. All other questions/concerns have been addressed to the best of my abilities.

## 2021-08-21 NOTE — DISCHARGE NOTE FOR THE EXPIRED PATIENT - FINDINGS/TREATMENT
Limited TTE 8/20/2021  1. Endocardium not well visualized; grossly normal left ventricular systolic function.  2. The right ventricle is not well visualized; grossly normal right ventricular systolic function.  3. Estimated right ventricular systolic pressure equals 48 mm Hg, assuming right atrial pressure equals 10 mm Hg,  consistent with mild pulmonary hypertension.  4. Small pericardial effusion is seen inferior to the left ventricle and adjacent to the right atrium and right ventricle (largest measurement 0.9 cm inferior to the left ventricle in the parasternal long axis view). No echocardiographic evidence of tamponade physiology is seen. Pericardial effusion size appears similar when compared to the prior limited study on 08/13/2021.

## 2022-06-24 NOTE — PROGRESS NOTE ADULT - ASSESSMENT
89F with HTN, HLD, Chronic Venous Stasis Dermatitis, Pericardial Effusion who presents to ED for repeated falls, found to have large pericardial effusion w/ tamponade physiology on echo. Was thought to be chronic, but was drained by IR. Echo otherwise showed normal LV systolic function. Cardiology called back for diuretic management for pleural effusions. Effusions appear to be small and asymmetric. She does not have signs of decompensated heart failure and her lower ext edema is likely chronic from her dermatitis.     Recs:  - start standing lasix 20mg PO daily  - can consider thoracentesis if within goals of care; would assess for malignancy given history  - con't home ASA, ARB if not contraindicated per primary team  - pericardial drain management per IR    Victorino Tejada MD  Cardiology Fellow PGY-5  [facility]    Notes are not final until signed by attending  For all consults and questions:  [email or web address]   Login: [email or web address]

## 2023-10-30 NOTE — PROGRESS NOTE ADULT - ASSESSMENT
Pt is an 88 yo F with h/o A fib on Xarelto, HTN and HLD who presented to [facility] after she was found altered, nonverbal and with right gaze deviation when she woke up. At baseline pt is ambulatory and verbal. Pt required intubation in ER for airway protection given poor MS. Pt assessed by Stroke team: GIO ~4 hours ago, on Rivaroxaban, presents with AMS/weakness with NIHSS of 30 on ED arrival.  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  Given age, extended time window, oral anticoagulant use, large infarct, and high NIHSS, tPA relatively contraindicated/very high risk for heme and unlikely to be net beneficial.  D/w endovascular fellow Reanna, imaging reviewed, not a candidate for endovascular intervention. Pt admitted to the ICU.   Pt is an 88 yo F with h/o A fib on Xarelto, HTN and HLD who presented to [facility] after she was found altered, nonverbal and with right gaze deviation when she woke up. At baseline pt is ambulatory and verbal. Pt required intubation in ER for airway protection given poor MS. Pt assessed by Stroke team: GIO ~4 hours ago, on Rivaroxaban, presents with AMS/weakness with NIHSS of 30 on ED arrival.  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  Given age, extended time window, oral anticoagulant use, large infarct, and high NIHSS, tPA relatively contraindicated/very high risk for heme and unlikely to be net beneficial.  D/w endovascular fellow Reanna, imaging reviewed, not a candidate for endovascular intervention. Pt admitted to the ICU. Today pt noted with change in neuro exam and sent urgent for CT head: large acute infarcts right JIMMY and MCA territories. Significant associated mass effect R -> L and associated herniation. Daughter at the bedside and updated    Resp: Cont current vent settings  CVS: Permissive HTN  HEME: DVT prophylaxis with sq Heparin  FEN: May cont enteral feeds/ Avoid hyponatremia  Endo: Goal Glu 140-180  Renal: Follow BUN/Cr and   Neuro: Supportive care; pt with extremely poor neurological prognosis with increased risk to progress to brain death/ F/u as per Neuro     HPI: A 89F PMH of HTN, who presents to ED for repeated falls over the past week found to have 5th/6th rib fractures, incidental pericardial effusion, and cellulitis- admitted for pain control and IV Antibiotics.

## 2024-03-26 NOTE — PROGRESS NOTE ADULT - PROBLEM SELECTOR PLAN 3
CT Head/CT Cervical Spine with no acute pathology.   X-rays Ankle, Knee, Pelvis, Tib/Fib negative for acute pathology.   Pain control, Tylenol prn. Hot/Cold packs. Lidocaine patch.   Incentive Spirometry.  Fall Risk Protocol, PT evaluation--> AILYN.  B12, Folate, TSH wnl 70 year old female s/p revision of right total hip arthroplasty posterior approach 3/25/24.   Doppler b/l LEs: Negative impression for DVT

## 2025-02-10 NOTE — PATIENT PROFILE ADULT - VISION (WITH CORRECTIVE LENSES IF THE PATIENT USUALLY WEARS THEM):
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 Routine Discontinued Romberg, Michael S, MD     - Diagnosis:    Pressure injury (specify)     - Pressure Injury (specify):    Stage 4     - Dressing change(s) to be done by:    Wound Care Team     - Dressing change(s) to be done by:    Other (specify)     - Other (specify):    Home Health RN/Spouse     - Dressing frequency:    Daily     - Wound location:    sacrum, left buttock, right heel     - Dressing change(s) to be done using:    Clean Technique     - Clean wound with:    Normal saline     - Clean wound with:    Vashe     - Protect periwound with:    Other (specify)     - Other (specify):    Zinc oxide to each connie-wound     - Topical agents:    Other (specify)     - Apply Other (specify) to:    0.125% Dakins solution or Vashe solution     - Dressing type:    Gauze (multiple)     - Dressing type:    ABD dressing     - Cover dressing:    Tape-fabric (Medipore)     - Specify order of application::    see comments for specifics   04/08/24 1626 Complete Wound Care Routine Discontinued Romberg, Michael S, MD     - Diagnosis:    Pressure injury (specify)     - Pressure Injury (specify):    Stage 4     - Dressing change(s) to be done by:    Wound Care Team     - Dressing change(s) to be done by:    Other (specify)     - Other (specify):    Home Health RN/Spouse     - Dressing frequency:    Daily     - Wound location:    sacrum, left buttock     - Dressing change(s) to be done using:    Clean Technique     - Clean wound with:    Normal saline     - Clean wound with:    Vashe     - Protect periwound with:    Other  (specify)     - Other (specify):    Zinc oxide to each connie-wound     - Topical agents:    Other (specify)     - Apply Other (specify) to:    0.125% Dakins solution or Vashe solution     - Dressing type:    Gauze (multiple)     - Dressing type:    ABD dressing     - Cover dressing:    Tape-fabric (Medipore)     - Specify order of application::    Zinc oxide to each connie-wound. Apply Dakins or Vashe moistened gauze to the sacrum and left buttock wound beds. Cover with dry gauze, an ABD pad, and secure with Medipore fabric or paper tape.   03/21/24 1530 Complete Wound Care Routine Discontinued Romberg, Michael S, MD     - Diagnosis:    Pressure injury (specify)     - Pressure Injury (specify):    Stage 4     - Dressing change(s) to be done by:    Wound Care Team     - Dressing change(s) to be done by:    Other (specify)     - Other (specify):    Home Health RN/Spouse     - Dressing frequency:    Daily     - Wound location:    sacrum, left buttock     - Dressing change(s) to be done using:    Clean Technique     - Clean wound with:    Normal saline     - Clean wound with:    Vashe     - Protect periwound with:    Other (specify)     - Other (specify):    Zinc oxide to each connie-wound     - Topical agents:    Other (specify)     - Apply Other (specify) to:    0.125% Dakins solution or Vashe solution     - Dressing type:    Gauze (multiple)     - Dressing type:    ABD dressing     - Cover dressing:    Tape-fabric (Medipore)     - Specify order of application::    Zinc oxide to each connie-wound. Apply Dakins or Vashe moistened gauze to the sacrum and left buttock wound beds. Cover with dry gauze, an ABD pad, and secure with Medipore fabric or paper tape.   03/07/24 1246 Complete Wound Care Routine Discontinued Romberg, Michael S, MD     - Diagnosis:    Pressure injury (specify)     - Pressure Injury (specify):    Stage 4     - Dressing change(s) to be done by:    Wound Care Team     - Dressing change(s) to be done by:     Other (specify)     - Other (specify):    Home Health RN/Spouse     - Dressing frequency:    Daily     - Wound location:    sacrum, left buttock     - Dressing change(s) to be done using:    Clean Technique     - Clean wound with:    Normal saline     - Clean wound with:    Vashe     - Protect periwound with:    Other (specify)     - Other (specify):    Zinc oxide to each connie-wound.     - Topical agents:    Other (specify)     - Apply Other (specify) to:    0.125% Dakins solution or Vashe     - Dressing type:    Gauze (multiple)     - Dressing type:    ABD dressing     - Cover dressing:    Tape-fabric (Medipore)     - Specify order of application::    Zinc oxide to each connie-wound. Apply Dakins or Vashe moisten gauze to the sacrum and left buttock wound beds. Cover with dry gauze, an ABD pad, and secure with Medipore fabric or paper tape.   02/19/24 9178 Complete Wound Care Routine Discontinued Romberg, Michael S, MD     - Diagnosis:    Pressure injury (specify)     - Pressure Injury (specify):    Stage 4     - Dressing change(s) to be done by:    Wound Care Team     - Dressing change(s) to be done by:    Other (specify)     - Other (specify):    Home Health RN/Spouse     - Dressing frequency:    Daily     - Wound location:    sacrum, right heel, left buttock     - Dressing change(s) to be done using:    Clean Technique     - Clean wound with:    Normal saline     - Clean wound with:    Vashe     - Protect periwound with:    Other (specify)     - Other (specify):    Zinc oxide to each connie-wound     - Topical agents:    Other (specify)     - Apply Other (specify) to:    0.125% Dakins solution or Vashe     - Dressing type:    Gauze (multiple)     - Dressing type:    ABD dressing     - Dressing type:    Gauze roll-conforming (e.g. Donta)     - Cover dressing:    Tape-fabric (Medipore)     - Specify order of application::    see comments for specific instructions   02/01/24 1305 Complete Wound Care Routine  Discontinued Romberg, Michael S, MD     - Diagnosis:    Pressure injury (specify)     - Pressure Injury (specify):    Stage 4     - Dressing change(s) to be done by:    Wound Care Team     - Dressing change(s) to be done by:    Other (specify)     - Other (specify):    Home Health RN/Spouse     - Dressing frequency:    Daily     - Wound location:    sacrum, right heel, left buttock     - Dressing change(s) to be done using:    Clean Technique     - Clean wound with:    Normal saline     - Clean wound with:    Vashe     - Protect periwound with:    Other (specify)     - Other (specify):    Zinc oxide to each connie-wound     - Topical agents:    Other (specify)     - Apply Other (specify) to:    0.125% Dakins solution or Vashe     - Dressing type:    Gauze (multiple)     - Dressing type:    ABD dressing     - Dressing type:    Gauze roll-conforming (e.g. Donta)     - Cover dressing:    Tape-fabric (Medipore)     - Specify order of application::    see comments for specific instructions   01/11/24 1326 Complete Wound Care Routine Discontinued Romberg, Michael S, MD     - Diagnosis:    Pressure injury (specify)     - Pressure Injury (specify):    Stage 4     - Dressing change(s) to be done by:    Wound Care Team     - Dressing change(s) to be done by:    Other (specify)     - Other (specify):    Home Health RN/Spouse     - Dressing frequency:    Daily     - Wound location:    sacrum     - Dressing change(s) to be done using:    Clean Technique     - Clean wound with:    Normal saline     - Clean wound with:    Vashe     - Protect periwound with:    Other (specify)     - Other (specify):    Desitin (Zinc Oxide) to the connie-wound.     - Topical agents:    Dakin’s solution     - Dressing type:    Gauze (multiple)     - Dressing type:    ABD dressing     - Dressing type:    Gauze roll-conforming (e.g. Donta)     - Cover dressing:    Tape-fabric (Medipore)     - Specify order of application::    see comments for specifics    12/21/23 1618 Complete Wound Care Routine Discontinued Romberg, Michael S, MD     - Diagnosis:    Pressure injury (specify)     - Pressure Injury (specify):    Stage 4     - Dressing change(s) to be done by:    Wound Care Team     - Dressing change(s) to be done by:    Other (specify)     - Other (specify):    Home Health RN/Spouse     - Dressing frequency:    Daily     - Wound location:    sacrum     - Dressing change(s) to be done using:    Clean Technique     - Clean wound with:    Normal saline     - Clean wound with:    Vashe     - Protect periwound with:    Other (specify)     - Other (specify):    Desitin (Zinc Oxide) to the connie-wound     - Topical agents:    Dakin’s solution     - Dressing type:    Gauze (multiple)     - Dressing type:    ABD dressing     - Dressing type:    Gauze roll-conforming (e.g. Donta)     - Cover dressing:    Tape-fabric (Medipore)     - Specify order of application::    ONE piece Dakins moist donta gauze roll (pack into wound). Cover with dry gauze and an ABD pad. Secure with paper or fabric tape.   11/30/23 1341 Complete Wound Care Routine Discontinued Romberg, Michael S, MD     - Diagnosis:    Pressure injury (specify)     - Pressure Injury (specify):    Stage 4     - Dressing change(s) to be done by:    Wound Care Team     - Dressing change(s) to be done by:    Other (specify)     - Other (specify):    Home Health RN/Spouse     - Dressing frequency:    Daily     - Wound location:    sacrum     - Dressing change(s) to be done using:    Clean Technique     - Clean wound with:    Normal saline     - Clean wound with:    Vashe     - Protect periwound with:    Other (specify)     - Other (specify):    Desitin (Zinc Oxide) to the connie-wound     - Topical agents:    Dakin’s solution     - Dressing type:    Gauze (multiple)     - Dressing type:    ABD dressing     - Dressing type:    Gauze roll-conforming (e.g. Donta)     - Cover dressing:    Tape-fabric (Medipore)     - Specify order  of application::    ONE piece Dakins moist donta gauze roll (pack into wound). Cover with dry gauze and an ABD pad. Secure with paper or fabric tape.   11/16/23 1517 Complete Wound Care Routine Discontinued Romberg, Michael S, MD     - Diagnosis:    Pressure injury (specify)     - Pressure Injury (specify):    Stage 4     - Dressing change(s) to be done by:    Wound Care Team     - Dressing change(s) to be done by:    Other (specify)     - Other (specify):    Home Health RN/Spouse     - Dressing frequency:    Daily     - Wound location:    sacrum     - Dressing change(s) to be done using:    Clean Technique     - Clean wound with:    Normal saline     - Clean wound with:    Vashe     - Protect periwound with:    Other (specify)     - Other (specify):    Desitin (Zinc Oxide) to the connie-wound     - Topical agents:    Dakin’s solution     - Dressing type:    Gauze (multiple)     - Dressing type:    ABD dressing     - Dressing type:    Gauze roll-conforming (e.g. Donta)     - Cover dressing:    Tape-fabric (Medipore)     - Specify order of application::    ONE piece Dakins moist donta gauze roll (pack into wound). Cover with dry gauze and an ABD pad. Secure with paper or fabric tape.   11/02/23 1700 Complete Wound Care Routine Discontinued Romberg, Michael S, MD     - Diagnosis:    Pressure injury (specify)     - Pressure Injury (specify):    Stage 4     - Dressing change(s) to be done by:    Wound Care Team     - Dressing change(s) to be done by:    Other (specify)     - Other (specify):    Home Health RN/Spouse     - Dressing frequency:    Daily     - Wound location:    sacrum     - Dressing change(s) to be done using:    Clean Technique     - Clean wound with:    Normal saline     - Clean wound with:    Vashe     - Protect periwound with:    Other (specify)     - Other (specify):    Desitin (Zinc Oxide) to the connie-wound     - Topical agents:    Dakin’s solution     - Dressing type:    Gauze (multiple)     -  Dressing type:    ABD dressing     - Dressing type:    Gauze roll-conforming (e.g. Donta)     - Cover dressing:    Tape-fabric (Medipore)     - Specify order of application::    ONE piece Dakins moist donta gauze roll (pack into wound and into all areas of undermining). Cover with dry gauze and and ABD pad. Secure with paper or fabric tape.   10/19/23 1421 Complete Wound Care Routine Discontinued Romberg, Michael S, MD     - Diagnosis:    Pressure injury (specify)     - Pressure Injury (specify):    Stage 4     - Dressing change(s) to be done by:    Wound Care Team     - Dressing change(s) to be done by:    Other (specify)     - Other (specify):    Home Health RN/Spouse     - Dressing frequency:    Daily     - Wound location:    sacrum     - Dressing change(s) to be done using:    Clean Technique     - Clean wound with:    Normal saline     - Clean wound with:    Vashe     - Protect periwound with:    Other (specify)     - Other (specify):    Desitin (Zinc Oxide) to the connie-wound.     - Topical agents:    Dakin’s solution     - Topical agents:    Vashe     - Topical agents:    Duoderm Xthin     - Dressing type:    Gauze (multiple)     - Dressing type:    ABD dressing     - Cover dressing:    Tape-fabric (Medipore)     - Specify order of application::    see comments for specifics   10/19/23 1339 Complete Wound Care Routine Discontinued Romberg, Michael S, MD     - Diagnosis:    Pressure injury (specify)     - Pressure Injury (specify):    Stage 4     - Dressing change(s) to be done by:    Wound Care Team     - Dressing change(s) to be done by:    Other (specify)     - Other (specify):    Home Health RN/Spouse     - Dressing frequency:    Daily     - Wound location:    sacrum     - Dressing change(s) to be done using:    Clean Technique     - Clean wound with:    Normal saline     - Clean wound with:    Vashe     - Protect periwound with:    Other (specify)     - Other (specify):    Desitin (Zinc Oxide) to the  connie-wound.     - Topical agents:    Dakin’s solution     - Topical agents:    Vashe     - Topical agents:    Duoderm Xthin     - Dressing type:    Gauze (multiple)     - Dressing type:    ABD dressing     - Cover dressing:    Tape-fabric (Medipore)     - Specify order of application::    see comments for specifics   10/19/23 1256 Complete Wound Care Routine Discontinued Romberg, Michael S, MD     - Diagnosis:    Pressure injury (specify)     - Pressure Injury (specify):    Stage 4     - Dressing change(s) to be done by:    Wound Care Team     - Dressing change(s) to be done by:    Other (specify)     - Other (specify):    Home Health RN/Spouse     - Dressing frequency:    Daily     - Wound location:    sacrum     - Dressing change(s) to be done using:    Clean Technique     - Clean wound with:    Normal saline     - Clean wound with:    Vashe     - Protect periwound with:    Other (specify)     - Other (specify):    Desitin (Zinc Oxide) to the connie-wound.     - Topical agents:    Dakin’s solution     - Topical agents:    Vashe     - Topical agents:    Duoderm Xthin     - Dressing type:    Gauze (multiple)     - Dressing type:    ABD dressing     - Cover dressing:    Tape-fabric (Medipore)     - Specify order of application::    see comments for specifics   10/05/23 1407 Complete Wound Care Routine Discontinued Romberg, Michael S, MD     - Diagnosis:    Pressure injury (specify)     - Pressure Injury (specify):    Stage 4     - Dressing change(s) to be done by:    Wound Care Team     - Dressing change(s) to be done by:    Other (specify)     - Other (specify):    Home Health RN/Spouse     - Dressing frequency:    Daily     - Wound location:    sacrum     - Dressing change(s) to be done using:    Clean Technique     - Clean wound with:    Normal saline     - Clean wound with:    Vashe     - Protect periwound with:    Other (specify)     - Other (specify):    Desitin (Zinc Oxide) to the connie-wound.     - Topical  agents:    Dakin’s solution     - Topical agents:    Vashe     - Topical agents:    Duoderm Xthin     - Dressing type:    Gauze roll-conforming (e.g. Donta)     - Dressing type:    Gauze (multiple)     - Dressing type:    ABD dressing     - Cover dressing:    Tape-fabric (Medipore)     - Specify order of application::    see comments for specifics   09/21/23 1638 Complete Wound Care Routine Discontinued Romberg, Michael S, MD     - Diagnosis:    Pressure injury (specify)     - Pressure Injury (specify):    Stage 4     - Dressing change(s) to be done by:    Wound Care Team     - Dressing change(s) to be done by:    Other (specify)     - Other (specify):    Home Health RN/Spouse     - Dressing frequency:    Daily     - Wound location:    sacrum     - Dressing change(s) to be done using:    Clean Technique     - Clean wound with:    Normal saline     - Clean wound with:    Vashe     - Protect periwound with:    Other (specify)     - Other (specify):    Desitin (Zinc Oxide) to the connie-wound.     - Topical agents:    Dakin’s solution     - Topical agents:    Vashe     - Topical agents:    Duoderm Xthin     - Dressing type:    Gauze roll-conforming (e.g. Donta)     - Dressing type:    Gauze (multiple)     - Dressing type:    ABD dressing     - Cover dressing:    Tape-fabric (Medipore)     - Specify order of application::    see comments for specifics   08/31/23 1421 Complete Wound Care Routine Discontinued Romberg, Michael S, MD     - Diagnosis:    Pressure injury (specify)     - Pressure Injury (specify):    Stage 4     - Dressing change(s) to be done by:    Wound Care Team     - Dressing change(s) to be done by:    Other (specify)     - Other (specify):    Home Health RN/Spouse     - Dressing frequency:    Daily     - Wound location:    sacrum     - Dressing change(s) to be done using:    Clean Technique     - Clean wound with:    Normal saline     - Clean wound with:    Vashe     - Protect periwound with:    Other  (specify)     - Other (specify):    Desitin (Zinc Oxide) to the connie-wound.     - Topical agents:    Dakin’s solution     - Topical agents:    Vashe     - Dressing type:    Gauze roll-conforming (e.g. Donta)     - Dressing type:    Gauze (multiple)     - Dressing type:    ABD dressing     - Cover dressing:    Tape-fabric (Medipore)     - Specify order of application::    Desitin (Zinc Oxide) to the connie-wound. Dakin's 0.125% (1/4 strength) or Vashe moistened Donta gauze roll to the wound bed - also gently pack into all areas of undermining. Cover with dry gauze and an ABD pad. Secure with fabric or paper tape only.   08/17/23 1515 Complete Wound Care Routine Discontinued Romberg, Michael S, MD     - Diagnosis:    Pressure injury (specify)     - Pressure Injury (specify):    Stage 4     - Dressing change(s) to be done by:    Wound Care Team     - Dressing change(s) to be done by:    Other (specify)     - Other (specify):    Home Health RN/Spouse     - Dressing frequency:    Daily     - Wound location:    sacrum     - Dressing change(s) to be done using:    Clean Technique     - Clean wound with:    Normal saline     - Clean wound with:    Vashe     - Topical agents:    Dakin’s solution     - Topical agents:    Vashe     - Dressing type:    Gauze roll-conforming (e.g. Donta)     - Dressing type:    Gauze (multiple)     - Dressing type:    ABD dressing     - Cover dressing:    Tape-fabric (Medipore)     - Specify order of application::    Dakin's 0.125% (1/4 strength) or Vashe moistened Donta gauze roll to the wound bed - also gently pack into all areas of undermining. Cover with dry gauze and an ABD pad. Secure with fabric or paper tape only.   07/27/23 1538 Complete Wound Care Routine Discontinued Romberg, Michael S, MD     - Diagnosis:    Pressure injury (specify)     - Pressure Injury (specify):    Stage 4     - Dressing change(s) to be done by:    Wound Care Team     - Dressing change(s) to be done by:    Other  (specify)     - Other (specify):    Spouse / Family     - Dressing frequency:    Daily     - Wound location:    sacrum     - Dressing change(s) to be done using:    Clean Technique     - Clean wound with:    Normal saline     - Clean wound with:    Vashe     - Topical agents:    Dakin’s solution     - Dressing type:    Gauze roll-conforming (e.g. Donta)     - Dressing type:    Gauze (multiple)     - Dressing type:    ABD dressing     - Cover dressing:    Tape-fabric (Medipore)     - Specify order of application::    Dakin's 0.125% (1/4 strength) moistened Donta gauze roll to the wound bed - also gently pack into all areas of undermining. Cover with dry gauze and an ABD pad. Secure with fabric or paper tape only.   07/13/23 1417 Complete Wound Care Routine Discontinued Romberg, Michael S, MD     - Diagnosis:    Pressure injury (specify)     - Pressure Injury (specify):    Stage 4     - Dressing change(s) to be done by:    Wound Care Team     - Dressing change(s) to be done by:    Other (specify)     - Other (specify):    Spouse / Family     - Dressing frequency:    Daily     - Wound location:    sacrum     - Dressing change(s) to be done using:    Clean Technique     - Clean wound with:    Normal saline     - Clean wound with:    Vashe     - Topical agents:    Dakin’s solution     - Dressing type:    Gauze roll-conforming (e.g. Donta)     - Dressing type:    Gauze (multiple)     - Dressing type:    ABD dressing     - Cover dressing:    Tape-fabric (Medipore)     - Specify order of application::    Dakin's 0.125% (1/4 strength) moistened Donta gauze roll to the wound bed - also gently pack into all areas of undermining. Cover with dry gauze and an ABD pad. Secure with fabric or paper tape only.   06/29/23 0972 Complete Wound Care Routine Discontinued Romberg, Michael S, MD     - Diagnosis:    Pressure injury (specify)     - Pressure Injury (specify):    Stage 4     - Dressing change(s) to be done by:    Wound Care  Team     - Dressing change(s) to be done by:    Other (specify)     - Other (specify):    Spouse / Family     - Dressing frequency:    Daily     - Wound location:    sacrum     - Dressing change(s) to be done using:    Clean Technique     - Clean wound with:    Normal saline     - Clean wound with:    Vashe     - Topical agents:    Dakin’s solution     - Dressing type:    Gauze roll-conforming (e.g. Donta)     - Dressing type:    Gauze (multiple)     - Dressing type:    ABD dressing     - Cover dressing:    Tape-fabric (Medipore)     - Specify order of application::    Dakin's 0.125% (1/4 strength) moistened Donta gauze roll to the wound bed including all areas of undermining. Cover with dry gauze and an ABD pad. Secure with fabric or paper tape only.   06/15/23 1336 Complete Wound Care Routine Discontinued Romberg, Michael S, MD     - Diagnosis:    Pressure injury (specify)     - Pressure Injury (specify):    Stage 4     - Dressing change(s) to be done by:    Wound Care Team     - Dressing change(s) to be done by:    Other (specify)     - Other (specify):    Spouse / Family     - Dressing frequency:    Daily     - Wound location:    sacrum     - Dressing change(s) to be done using:    Clean Technique     - Clean wound with:    Normal saline     - Clean wound with:    Vashe     - Topical agents:    Dakin’s solution     - Dressing type:    Gauze roll-conforming (e.g. Donta)     - Dressing type:    Gauze (multiple)     - Dressing type:    ABD dressing     - Cover dressing:    Tape-fabric (Medipore)     - Specify order of application::    Dakin's 0.125% (1/4 strength) moistened Donta gauze roll to the wound bed including all areas of undermining. Cover with dry gauze and an ABD pad. Secure with fabric or paper tape only.   05/22/23 1622 Complete Wound Care Routine Discontinued Romberg, Michael S, MD     - Diagnosis:    Pressure injury (specify)     - Pressure Injury (specify):    Unstageable     - Dressing change(s) to  be done by:    Wound Care Team     - Dressing change(s) to be done by:    Other (specify)     - Other (specify):    Spouse / Family     - Dressing frequency:    Daily     - Wound location:    sacrum     - Dressing change(s) to be done using:    Clean Technique     - Clean wound with:    Normal saline     - Clean wound with:    Vashe     - Topical agents:    Dakin’s solution     - Dressing type:    Gauze roll-conforming (e.g. Donta)     - Dressing type:    Gauze (multiple)     - Dressing type:    ABD dressing     - Cover dressing:    Tape-fabric (Medipore)     - Specify order of application::    Dakin's 0.125% (1/4 strength) moisten Donta gauze roll to the wound bed and undermining areas. Cover with dry gauze and ABD pad. Secure with fabric tape or paper tape only.       Wound Ischial tuberosity Left Pressure Injury (Active)   Date First Assessed: 08/10/24   Present on Original Admission: Yes  Location: Ischial tuberosity  Laterality: Left  Level of Skin Injury: Full Thickness  Primary Wound Type: Pressure Injury  Initial Pressure Injury Stage: Stage 4  Wound Approximate Ag...      Assessments 10/5/2023 10:57 AM 2/10/2025 10:36 AM   Wound Image       Level of Skin Injury Assessment -- Full Thickness   Pressure Injury Stage -- Stage 4   Dressing Assessment -- Intact;Drainage present   Dressing Activity -- Changed   Dressing Changed On   -- 02/10/25   Wound Exudate None Moderate;Serous;No odor   Cleansing Agent Normal saline Hypochlorous acid (e.g. Vashe)   Wound Bed/Tissue Type Granulated;Red Granulated;Red   Wound Bed % Granulated 100 % 100 %   Periwound Condition Normal Normal   Wound Edge Well defined;Attached to wound bed Unattached to wound bed;Well defined   Wound Status -- Unchanged   Topical Agent Hypochlorous acid (e.g. Vashe, Exsept) Zinc ointment;Hypochlorous acid (e.g. Vashe, Exsept)   Wound Dressing Gauze;ABD dressing;Tape-fabric Gauze (multiple);ABD dressing;Tape-fabric   Number of Gauze Pieces Placed --  1 piece of vaseh moistenedg gauze   Number of Gauze Pieces Removed -- 1 piece of gauze   Wound Protection Other (comment) Other (comment)   Wound Last Measured 10/05/23 02/10/25   Wound Length (cm) 1.6 cm 6.6 cm   Wound Width (cm) 2.1 cm 5 cm   Wound Depth (cm) 0.1 cm 2 cm   Wound Surface Area (cm^2) 3.36 cm^2 33 cm^2   Wound Volume (cm^3) 0.336 cm^3 66 cm^3   PhotoTaken? Yes Yes   Tunneling/Undermining Present? -- Yes   Undermining #1 Measurement -- 1   Undermining Clock-based Location #1 -- 1 o'clock to 4 o'clock       Inactive Orders   Date Order Priority Status Authorizing Provider   01/20/25 1218 Complete Wound Care 2 Wounds Associated Routine Discontinued Romberg, Michael S, MD     - Diagnosis:    Pressure injury (specify)     - Pressure Injury (specify):    Stage 4     - Wound location:    sacrum, left ischium     - Dressing change(s) to be done by:    Wound Care Team     - Dressing change(s) to be done by:    Patient/Family     - Dressing frequency:    Daily     - Apply Other (specify) to:    Daily     - Dressing change(s) to be done using:    Clean Technique     - Clean wound with:    Normal saline     - Protect periwound with:    Barrier cream     - Protect periwound with:    Other (specify)     - Other (specify):    Desitin (zinc oxide) to each connie-wound     - Topical agents:    Other (specify)     - Dressing type:    Gauze     - Dressing type:    ABD dressing     - Cover dressing:    Tape-fabric (e.g., Medipore)     - Specify order of application::    Desitin to each connie-wound. Vashe moist gauze to each wound bed. Cover with dry gauze, an ABD pad, and secure with fabric tape (or silicone bordered foam).   12/23/24 1404 Complete Wound Care 2 Wounds Associated Routine Discontinued Romberg, Michael S, MD     - Diagnosis:    Pressure injury (specify)     - Pressure Injury (specify):    Stage 4     - Wound location:    sacrum, left ischium     - Dressing change(s) to be done by:    Wound Care Team     -  Dressing change(s) to be done by:    Patient/Family     - Dressing frequency:    Daily     - Apply Other (specify) to:    Vashe moistened gauze     - Dressing change(s) to be done using:    Clean Technique     - Clean wound with:    Normal saline     - Protect periwound with:    Barrier cream     - Protect periwound with:    Other (specify)     - Other (specify):    Desitin (zinc oxide) to each connie-wound     - Topical agents:    Other (specify)     - Dressing type:    Gauze     - Dressing type:    ABD dressing     - Cover dressing:    Tape-fabric (e.g., Medipore)     - Specify order of application::    Desitin to each connie-wound. Vashe moist gauze to each wound bed. Cover with dry gauze, an ABD pad, and fabric tape (or silicone bordered foam).   12/02/24 1152 Complete Wound Care 2 Wounds Associated Routine Discontinued Romberg, Michael S, MD     - Diagnosis:    Pressure injury (specify)     - Pressure Injury (specify):    Stage 4     - Wound location:    sacrum, left ischium     - Dressing change(s) to be done by:    Wound Care Team     - Dressing change(s) to be done by:    Patient/Family     - Dressing frequency:    Daily     - Apply Other (specify) to:    Vashe moistened gauze     - Dressing change(s) to be done using:    Clean Technique     - Clean wound with:    Normal saline     - Protect periwound with:    Barrier cream     - Protect periwound with:    Other (specify)     - Other (specify):    Desitin (zinc oxide) to each connie-wound     - Topical agents:    Other (specify)     - Dressing type:    Gauze     - Dressing type:    ABD dressing     - Cover dressing:    Tape-fabric (e.g., Medipore)     - Specify order of application::    Desitin to each connie-wound. Vashe moist gauze to each wound bed. Cover with dry gauze, an ABD pad, and fabric tape (or silicone bordered foam).   11/11/24 1335 Complete Wound Care 2 Wounds Associated Routine Discontinued Romberg, Michael S, MD     - Diagnosis:    Pressure injury  (specify)     - Pressure Injury (specify):    Stage 4     - Wound location:    sacrum, left ischium     - Dressing change(s) to be done by:    Wound Care Team     - Dressing change(s) to be done by:    Patient/Family     - Dressing frequency:    Daily     - Apply Other (specify) to:    Vashe moistened gauze     - Dressing change(s) to be done using:    Clean Technique     - Clean wound with:    Normal saline     - Protect periwound with:    Barrier cream     - Protect periwound with:    Other (specify)     - Other (specify):    Desitin (zinc oxide) to each connie-wound     - Topical agents:    Other (specify)     - Dressing type:    Gauze     - Dressing type:    ABD dressing     - Specify order of application::    Desitin to each connie-wound. Vashe moist gauze to each wound bed. Cover with dry gauze and an ABD pad (or silicone bordered foam).   10/24/24 1305 Complete Wound Care 2 Wounds Associated Routine Discontinued Romberg, Michael S, MD     - Diagnosis:    Pressure injury (specify)     - Pressure Injury (specify):    Stage 4     - Wound location:    sacrum, left ischium     - Dressing change(s) to be done by:    Wound Care Team     - Dressing change(s) to be done by:    Patient/Family     - Dressing frequency:    Daily     - Apply Other (specify) to:    Vashe moistened gauze     - Dressing change(s) to be done using:    Clean Technique     - Clean wound with:    Normal saline     - Protect periwound with:    Barrier cream     - Topical agents:    Other (specify)     - Dressing type:    Gauze     - Dressing type:    ABD dressing     - Specify order of application::    Desitin to each connie-wound. Vashe moistened gauze to each wound bed. Cover with dry gauze and an ABD pad (or silicone bordered foam).   09/26/24 1254 Complete Wound Care 3 Wounds Associated Routine Discontinued Romberg, Michael S, MD     - Diagnosis:    Pressure injury (specify)     - Pressure Injury (specify):    Stage 4     - Wound location:     sacrum (stage 4), left ischium     - Dressing change(s) to be done by:    Wound Care Team     - Dressing change(s) to be done by:    Home Care Nurse     - Dressing change(s) to be done by:    Patient/Family     - Dressing frequency:    Daily     - Apply Other (specify) to:    vashe moistened guaze     - Dressing change(s) to be done using:    Clean Technique     - Clean wound with:    Normal saline     - Protect periwound with:    Barrier cream     - Topical agents:    Other (specify)     - Dressing type:    Gauze     - Dressing type:    ABD dressing     - Dressing type:    Gauze roll-conforming (e.g., Donta)     - Specify order of application::    toby to periwound, vashe moistened guaze to each wound bed. Cover with dry guaze and  abd, secure with paper or fabric tape. Donta to right heel ONLY.   09/09/24 1242 Complete Wound Care 3 Wounds Associated Routine Discontinued Romberg, Michael S, MD     - Diagnosis:    Pressure injury (specify)     - Pressure Injury (specify):    Stage 4     - Wound location:    sacrum (stage 4), left ischium     - Dressing change(s) to be done by:    Wound Care Team     - Dressing change(s) to be done by:    Home Care Nurse     - Dressing change(s) to be done by:    Patient/Family     - Dressing frequency:    Daily     - Apply Other (specify) to:    vashe moistened guaze     - Dressing change(s) to be done using:    Clean Technique     - Clean wound with:    Normal saline     - Protect periwound with:    Barrier cream     - Topical agents:    Other (specify)     - Dressing type:    Gauze     - Dressing type:    ABD dressing     - Dressing type:    Gauze roll-conforming (e.g., Donta)     - Specify order of application::    toby to periwound, vashe moistened guaze to each wound bed. Cover with dry guaze and  abd, secure with paper or fabric tape. Donta to right heel ONLY.   08/22/24 1252 Complete Wound Care 2 Wounds Associated Routine Discontinued Romberg, Michael S, MD     -  Diagnosis:    Pressure injury (specify)     - Pressure Injury (specify):    Stage 4     - Wound location:    sacrum (stage 4), left ischium     - Dressing change(s) to be done by:    Wound Care Team     - Dressing change(s) to be done by:    Home Care Nurse     - Dressing change(s) to be done by:    Patient/Family     - Dressing frequency:    Daily     - Apply Other (specify) to:    vashe moistened guaze     - Dressing change(s) to be done using:    Clean Technique     - Clean wound with:    Normal saline     - Protect periwound with:    Barrier cream     - Topical agents:    Other (specify)     - Dressing type:    Gauze     - Dressing type:    ABD dressing     - Specify order of application::    toby to periwound, vashe moistened guaze to each wound bed. Cover with dry guaze and  abd, secure with paper or fabric tape.   08/05/24 1355 Complete Wound Care 2 Wounds Associated Routine Discontinued Romberg, Michael S, MD     - Diagnosis:    Pressure injury (specify)     - Pressure Injury (specify):    Stage 4     - Wound location:    sacrum (stage 4), left ischium     - Dressing change(s) to be done by:    Wound Care Team     - Dressing change(s) to be done by:    Home Care Nurse     - Dressing change(s) to be done by:    Patient/Family     - Dressing frequency:    Daily     - Dressing change(s) to be done using:    Clean Technique     - Clean wound with:    Normal saline     - Protect periwound with:    Barrier cream     - Topical agents:    Other (specify)     - Apply Other (specify) to:    vashe moistened guaze     - Dressing type:    Gauze     - Dressing type:    ABD dressing     - Specify order of application::    toby to periwound, vashe moistened guaze to each wound bed. Cover with dry guaze and  abd, secure with paper or fabric tape.   07/18/24 1354 Complete Wound Care Pressure Injury Buttock Routine Discontinued Romberg, Michael S, MD     - Diagnosis:    Pressure injury (specify)     - Pressure Injury  (specify):    Stage 4     - Wound location:    left ischium     - Dressing change(s) to be done by:    Wound Care Team     - Dressing change(s) to be done by:    Patient/Family     - Dressing change(s) to be done by:    Home Care Nurse     - Dressing frequency:    Daily     - Dressing change(s) to be done using:    Clean Technique     - Clean wound with:    Normal saline     - Clean wound with:    Hypochlorous Acid (e.g.,Vashe/Exsept) in clinic     - Clean wound with:    Sodium Hypochlorite (e.g., Dakin’s Solution)     - Protect periwound with:    Other (specify)     - Other (specify):    Desitin to connie-wound     - Topical agents:    Dakin’s solution     - Dressing type:    Gauze (multiple)     - Dressing type:    ABD dressing     - Cover dressing:    Tape-fabric (e.g., Medipore)     - Specify order of application::    Apply Desitin (Zinc Oxide) to connie-wound. Apply Dakin's 0.125% (1/4 strength) or Vashe moistened gauze to wound bed.  Cover with dry gauze and an ABD pad. Secure with fabric or paper tape only.   07/01/24 1307 Complete Wound Care Pressure Injury Buttock Routine Discontinued Romberg, Michael S, MD     - Diagnosis:    Pressure injury (specify)     - Pressure Injury (specify):    Stage 4     - Wound location:    left ischium     - Dressing change(s) to be done by:    Wound Care Team     - Dressing change(s) to be done by:    Patient/Family     - Dressing change(s) to be done by:    Home Care Nurse     - Dressing frequency:    Daily     - Dressing change(s) to be done using:    Clean Technique     - Clean wound with:    Normal saline     - Clean wound with:    Hypochlorous Acid (e.g.,Vashe/Exsept) in clinic     - Clean wound with:    Sodium Hypochlorite (e.g., Dakin’s Solution)     - Protect periwound with:    Other (specify)     - Other (specify):    Desitin to connie-wound     - Topical agents:    Dakin’s solution     - Dressing type:    Gauze (multiple)     - Dressing type:    ABD dressing     - Cover  dressing:    Tape-fabric (e.g., Medipore)     - Specify order of application::    Apply Desitin (Zinc Oxide) to connie-wound. Apply Dakin's 0.125% (1/4 strength) or Vashe moistened gauze to wound bed.  Cover with dry gauze and an ABD pad. Secure with fabric or paper tape only.   06/17/24 1430 Complete Wound Care 2 Wounds Associated Routine Discontinued Romberg, Michael S, MD     - Diagnosis:    Pressure injury (specify)     - Pressure Injury (specify):    Stage 4     - Wound location:    sacrum (unstageable), left buttock (stage 4)     - Dressing change(s) to be done by:    Wound Care Team     - Dressing change(s) to be done by:    Patient/Family     - Dressing change(s) to be done by:    Home Care Nurse     - Dressing frequency:    Daily     - Dressing change(s) to be done using:    Clean Technique     - Clean wound with:    Normal saline     - Clean wound with:    Hypochlorous Acid (e.g.,Vashe/Exsept) in clinic     - Clean wound with:    Sodium Hypochlorite (e.g., Dakin’s Solution)     - Protect periwound with:    Other (specify)     - Other (specify):    Desitin (Zinc Oxide) to each connie-wound.     - Topical agents:    Dakin’s solution     - Dressing type:    Gauze (multiple)     - Dressing type:    ABD dressing     - Cover dressing:    Tape-fabric (e.g., Medipore)     - Specify order of application::    Apply Desitin (Zinc Oxide) to each connie-wound. Apply Dakin's 0.125% (1/4 strength) or Vashe moistened gauze to each wound bed.  Cover with dry gauze and an ABD pad. Secure with fabric or paper tape only.   05/20/24 0952 Complete Wound Care 3 Wounds Associated Routine Discontinued Romberg, Michael S, MD     - Diagnosis:    Pressure injury (specify)     - Pressure Injury (specify):    Stage 4     - Dressing change(s) to be done by:    Wound Care Team     - Dressing change(s) to be done by:    Other (specify)     - Other (specify):    Home Health RN/Spouse     - Dressing frequency:    Daily     - Wound location:     sacrum, left buttock, right heel     - Dressing change(s) to be done using:    Clean Technique     - Clean wound with:    Normal saline     - Clean wound with:    Vashe     - Protect periwound with:    Other (specify)     - Other (specify):    Zinc oxide to each connie-wound     - Topical agents:    Other (specify)     - Apply Other (specify) to:    0.125% Dakins solution or Vashe solution     - Dressing type:    Gauze (multiple)     - Dressing type:    ABD dressing     - Cover dressing:    Tape-fabric (Medipore)     - Specify order of application::    see comments for specifics   05/13/24 1422 Complete Wound Care Routine Discontinued Romberg, Michael S, MD     - Diagnosis:    Pressure injury (specify)     - Pressure Injury (specify):    Stage 4     - Dressing change(s) to be done by:    Wound Care Team     - Dressing change(s) to be done by:    Other (specify)     - Other (specify):    Home Health RN/Spouse     - Dressing frequency:    Daily     - Wound location:    sacrum, left buttock, right heel     - Dressing change(s) to be done using:    Clean Technique     - Clean wound with:    Normal saline     - Clean wound with:    Vashe     - Protect periwound with:    Other (specify)     - Other (specify):    Zinc oxide to each connie-wound     - Topical agents:    Other (specify)     - Apply Other (specify) to:    0.125% Dakins solution or Vashe solution     - Dressing type:    Gauze (multiple)     - Dressing type:    ABD dressing     - Cover dressing:    Tape-fabric (Medipore)     - Specify order of application::    see comments for specifics   04/25/24 1452 Complete Wound Care Routine Discontinued Romberg, Michael S, MD     - Diagnosis:    Pressure injury (specify)     - Pressure Injury (specify):    Stage 4     - Dressing change(s) to be done by:    Wound Care Team     - Dressing change(s) to be done by:    Other (specify)     - Other (specify):    Home Health RN/Spouse     - Dressing frequency:    Daily     - Wound  location:    sacrum, left buttock, right heel     - Dressing change(s) to be done using:    Clean Technique     - Clean wound with:    Normal saline     - Clean wound with:    Vashe     - Protect periwound with:    Other (specify)     - Other (specify):    Zinc oxide to each connie-wound     - Topical agents:    Other (specify)     - Apply Other (specify) to:    0.125% Dakins solution or Vashe solution     - Dressing type:    Gauze (multiple)     - Dressing type:    ABD dressing     - Cover dressing:    Tape-fabric (Medipore)     - Specify order of application::    see comments for specifics   04/08/24 1626 Complete Wound Care Routine Discontinued Romberg, Michael S, MD     - Diagnosis:    Pressure injury (specify)     - Pressure Injury (specify):    Stage 4     - Dressing change(s) to be done by:    Wound Care Team     - Dressing change(s) to be done by:    Other (specify)     - Other (specify):    Home Health RN/Spouse     - Dressing frequency:    Daily     - Wound location:    sacrum, left buttock     - Dressing change(s) to be done using:    Clean Technique     - Clean wound with:    Normal saline     - Clean wound with:    Vashe     - Protect periwound with:    Other (specify)     - Other (specify):    Zinc oxide to each connie-wound     - Topical agents:    Other (specify)     - Apply Other (specify) to:    0.125% Dakins solution or Vashe solution     - Dressing type:    Gauze (multiple)     - Dressing type:    ABD dressing     - Cover dressing:    Tape-fabric (Medipore)     - Specify order of application::    Zinc oxide to each connie-wound. Apply Dakins or Vashe moistened gauze to the sacrum and left buttock wound beds. Cover with dry gauze, an ABD pad, and secure with Medipore fabric or paper tape.   03/21/24 1530 Complete Wound Care Routine Discontinued Romberg, Michael S, MD     - Diagnosis:    Pressure injury (specify)     - Pressure Injury (specify):    Stage 4     - Dressing change(s) to be done by:    Wound  Care Team     - Dressing change(s) to be done by:    Other (specify)     - Other (specify):    Home Health RN/Spouse     - Dressing frequency:    Daily     - Wound location:    sacrum, left buttock     - Dressing change(s) to be done using:    Clean Technique     - Clean wound with:    Normal saline     - Clean wound with:    Vashe     - Protect periwound with:    Other (specify)     - Other (specify):    Zinc oxide to each connie-wound     - Topical agents:    Other (specify)     - Apply Other (specify) to:    0.125% Dakins solution or Vashe solution     - Dressing type:    Gauze (multiple)     - Dressing type:    ABD dressing     - Cover dressing:    Tape-fabric (Medipore)     - Specify order of application::    Zinc oxide to each connie-wound. Apply Dakins or Vashe moistened gauze to the sacrum and left buttock wound beds. Cover with dry gauze, an ABD pad, and secure with Medipore fabric or paper tape.   03/07/24 1246 Complete Wound Care Routine Discontinued Romberg, Michael S, MD     - Diagnosis:    Pressure injury (specify)     - Pressure Injury (specify):    Stage 4     - Dressing change(s) to be done by:    Wound Care Team     - Dressing change(s) to be done by:    Other (specify)     - Other (specify):    Home Health RN/Spouse     - Dressing frequency:    Daily     - Wound location:    sacrum, left buttock     - Dressing change(s) to be done using:    Clean Technique     - Clean wound with:    Normal saline     - Clean wound with:    Vashe     - Protect periwound with:    Other (specify)     - Other (specify):    Zinc oxide to each connie-wound.     - Topical agents:    Other (specify)     - Apply Other (specify) to:    0.125% Dakins solution or Vashe     - Dressing type:    Gauze (multiple)     - Dressing type:    ABD dressing     - Cover dressing:    Tape-fabric (Medipore)     - Specify order of application::    Zinc oxide to each connie-wound. Apply Dakins or Vashe moisten gauze to the sacrum and left buttock  wound beds. Cover with dry gauze, an ABD pad, and secure with Medipore fabric or paper tape.   02/19/24 1736 Complete Wound Care Routine Discontinued Romberg, Michael S, MD     - Diagnosis:    Pressure injury (specify)     - Pressure Injury (specify):    Stage 4     - Dressing change(s) to be done by:    Wound Care Team     - Dressing change(s) to be done by:    Other (specify)     - Other (specify):    Home Health RN/Spouse     - Dressing frequency:    Daily     - Wound location:    sacrum, right heel, left buttock     - Dressing change(s) to be done using:    Clean Technique     - Clean wound with:    Normal saline     - Clean wound with:    Vashe     - Protect periwound with:    Other (specify)     - Other (specify):    Zinc oxide to each connie-wound     - Topical agents:    Other (specify)     - Apply Other (specify) to:    0.125% Dakins solution or Vashe     - Dressing type:    Gauze (multiple)     - Dressing type:    ABD dressing     - Dressing type:    Gauze roll-conforming (e.g. Donta)     - Cover dressing:    Tape-fabric (Medipore)     - Specify order of application::    see comments for specific instructions   02/01/24 1308 Complete Wound Care Routine Discontinued Romberg, Michael S, MD     - Diagnosis:    Pressure injury (specify)     - Pressure Injury (specify):    Stage 4     - Dressing change(s) to be done by:    Wound Care Team     - Dressing change(s) to be done by:    Other (specify)     - Other (specify):    Home Health RN/Spouse     - Dressing frequency:    Daily     - Wound location:    sacrum, right heel, left buttock     - Dressing change(s) to be done using:    Clean Technique     - Clean wound with:    Normal saline     - Clean wound with:    Vashe     - Protect periwound with:    Other (specify)     - Other (specify):    Zinc oxide to each connie-wound     - Topical agents:    Other (specify)     - Apply Other (specify) to:    0.125% Dakins solution or Vashe     - Dressing type:    Gauze  (multiple)     - Dressing type:    ABD dressing     - Dressing type:    Gauze roll-conforming (e.g. Donta)     - Cover dressing:    Tape-fabric (Medipore)     - Specify order of application::    see comments for specific instructions   01/11/24 1542 Complete Wound Care Routine Discontinued Romberg, Michael S, MD     - Diagnosis:    Pressure injury (specify)     - Pressure Injury (specify):    Stage 3     - Dressing change(s) to be done by:    Wound Care Team     - Dressing change(s) to be done by:    Other (specify)     - Other (specify):    Home Health RN/Spouse     - Dressing frequency:    Daily     - Wound location:    left buttock, left hip     - Dressing change(s) to be done using:    Clean Technique     - Clean wound with:    Vashe     - Clean wound with:    Normal saline     - Topical agents:    Betadine solution     - Dressing type:    Gauze     - Dressing type:    ABD dressing     - Cover dressing:    Tape-fabric (Medipore)     - Specify order of application::    Betadine paint to each wound bed. Cover with gauze and an ABD pad. Secure with paper or fabric tape.   01/11/24 1326 Complete Wound Care Routine Discontinued Romberg, Michael S, MD     - Diagnosis:    Pressure injury (specify)     - Pressure Injury (specify):    Stage 3     - Dressing change(s) to be done by:    Wound Care Team     - Dressing change(s) to be done by:    Other (specify)     - Other (specify):    Home Health RN/Spouse     - Dressing frequency:    Daily     - Wound location:    left buttock, left hip, right buttock     - Dressing change(s) to be done using:    Clean Technique     - Clean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